# Patient Record
Sex: FEMALE | Race: WHITE | NOT HISPANIC OR LATINO | URBAN - METROPOLITAN AREA
[De-identification: names, ages, dates, MRNs, and addresses within clinical notes are randomized per-mention and may not be internally consistent; named-entity substitution may affect disease eponyms.]

---

## 2023-03-30 LAB
NATRIURETIC PEPTIDE B (PG/ML) IN SER/PLAS: 15 PG/ML (ref 0–99)
TROPONIN I, HIGH SENSITIVITY: 4 NG/L (ref 0–34)

## 2023-03-31 ENCOUNTER — OFFICE (OUTPATIENT)
Dept: URBAN - METROPOLITAN AREA CLINIC 27 | Facility: CLINIC | Age: 78
End: 2023-03-31

## 2023-03-31 VITALS
DIASTOLIC BLOOD PRESSURE: 94 MMHG | SYSTOLIC BLOOD PRESSURE: 142 MMHG | HEIGHT: 63 IN | WEIGHT: 123 LBS | HEART RATE: 113 BPM | TEMPERATURE: 97 F

## 2023-03-31 DIAGNOSIS — Z86.010 PERSONAL HISTORY OF COLONIC POLYPS: ICD-10-CM

## 2023-03-31 DIAGNOSIS — K59.09 OTHER CONSTIPATION: ICD-10-CM

## 2023-03-31 DIAGNOSIS — K21.9 GASTRO-ESOPHAGEAL REFLUX DISEASE WITHOUT ESOPHAGITIS: ICD-10-CM

## 2023-03-31 DIAGNOSIS — K22.70 BARRETT'S ESOPHAGUS WITHOUT DYSPLASIA: ICD-10-CM

## 2023-03-31 DIAGNOSIS — Z80.0 FAMILY HISTORY OF MALIGNANT NEOPLASM OF DIGESTIVE ORGANS: ICD-10-CM

## 2023-03-31 DIAGNOSIS — K22.2 ESOPHAGEAL OBSTRUCTION: ICD-10-CM

## 2023-03-31 PROCEDURE — 99214 OFFICE O/P EST MOD 30 MIN: CPT | Performed by: INTERNAL MEDICINE

## 2023-04-28 ENCOUNTER — AMBULATORY SURGICAL CENTER (OUTPATIENT)
Dept: URBAN - METROPOLITAN AREA SURGERY 12 | Facility: SURGERY | Age: 78
End: 2023-04-28

## 2023-04-28 ENCOUNTER — OFFICE (OUTPATIENT)
Dept: URBAN - METROPOLITAN AREA PATHOLOGY 2 | Facility: PATHOLOGY | Age: 78
End: 2023-04-28

## 2023-04-28 VITALS
SYSTOLIC BLOOD PRESSURE: 147 MMHG | DIASTOLIC BLOOD PRESSURE: 67 MMHG | HEART RATE: 75 BPM | SYSTOLIC BLOOD PRESSURE: 106 MMHG | DIASTOLIC BLOOD PRESSURE: 93 MMHG | SYSTOLIC BLOOD PRESSURE: 148 MMHG | DIASTOLIC BLOOD PRESSURE: 68 MMHG | RESPIRATION RATE: 12 BRPM | DIASTOLIC BLOOD PRESSURE: 93 MMHG | OXYGEN SATURATION: 96 % | DIASTOLIC BLOOD PRESSURE: 67 MMHG | SYSTOLIC BLOOD PRESSURE: 116 MMHG | SYSTOLIC BLOOD PRESSURE: 137 MMHG | HEART RATE: 75 BPM | HEART RATE: 85 BPM | SYSTOLIC BLOOD PRESSURE: 148 MMHG | OXYGEN SATURATION: 99 % | RESPIRATION RATE: 25 BRPM | HEART RATE: 98 BPM | OXYGEN SATURATION: 99 % | HEART RATE: 86 BPM | OXYGEN SATURATION: 96 % | HEART RATE: 79 BPM | OXYGEN SATURATION: 98 % | SYSTOLIC BLOOD PRESSURE: 104 MMHG | HEART RATE: 79 BPM | DIASTOLIC BLOOD PRESSURE: 60 MMHG | HEART RATE: 75 BPM | TEMPERATURE: 98.2 F | HEART RATE: 77 BPM | SYSTOLIC BLOOD PRESSURE: 129 MMHG | RESPIRATION RATE: 14 BRPM | TEMPERATURE: 98.2 F | HEART RATE: 79 BPM | SYSTOLIC BLOOD PRESSURE: 106 MMHG | RESPIRATION RATE: 19 BRPM | RESPIRATION RATE: 19 BRPM | DIASTOLIC BLOOD PRESSURE: 55 MMHG | DIASTOLIC BLOOD PRESSURE: 93 MMHG | WEIGHT: 118 LBS | DIASTOLIC BLOOD PRESSURE: 60 MMHG | OXYGEN SATURATION: 95 % | SYSTOLIC BLOOD PRESSURE: 145 MMHG | HEIGHT: 63 IN | HEIGHT: 63 IN | SYSTOLIC BLOOD PRESSURE: 152 MMHG | DIASTOLIC BLOOD PRESSURE: 94 MMHG | DIASTOLIC BLOOD PRESSURE: 60 MMHG | HEART RATE: 93 BPM | OXYGEN SATURATION: 97 % | SYSTOLIC BLOOD PRESSURE: 104 MMHG | RESPIRATION RATE: 14 BRPM | SYSTOLIC BLOOD PRESSURE: 145 MMHG | DIASTOLIC BLOOD PRESSURE: 55 MMHG | SYSTOLIC BLOOD PRESSURE: 147 MMHG | RESPIRATION RATE: 25 BRPM | SYSTOLIC BLOOD PRESSURE: 145 MMHG | OXYGEN SATURATION: 98 % | HEART RATE: 93 BPM | SYSTOLIC BLOOD PRESSURE: 137 MMHG | RESPIRATION RATE: 20 BRPM | HEART RATE: 86 BPM | RESPIRATION RATE: 12 BRPM | HEART RATE: 86 BPM | DIASTOLIC BLOOD PRESSURE: 68 MMHG | SYSTOLIC BLOOD PRESSURE: 106 MMHG | SYSTOLIC BLOOD PRESSURE: 147 MMHG | RESPIRATION RATE: 25 BRPM | DIASTOLIC BLOOD PRESSURE: 61 MMHG | SYSTOLIC BLOOD PRESSURE: 104 MMHG | RESPIRATION RATE: 20 BRPM | OXYGEN SATURATION: 95 % | DIASTOLIC BLOOD PRESSURE: 94 MMHG | SYSTOLIC BLOOD PRESSURE: 152 MMHG | RESPIRATION RATE: 13 BRPM | DIASTOLIC BLOOD PRESSURE: 94 MMHG | SYSTOLIC BLOOD PRESSURE: 137 MMHG | OXYGEN SATURATION: 97 % | DIASTOLIC BLOOD PRESSURE: 67 MMHG | DIASTOLIC BLOOD PRESSURE: 61 MMHG | OXYGEN SATURATION: 95 % | HEART RATE: 98 BPM | RESPIRATION RATE: 13 BRPM | HEART RATE: 98 BPM | HEART RATE: 93 BPM | TEMPERATURE: 98.2 F | OXYGEN SATURATION: 97 % | HEART RATE: 85 BPM | RESPIRATION RATE: 19 BRPM | HEART RATE: 91 BPM | DIASTOLIC BLOOD PRESSURE: 61 MMHG | OXYGEN SATURATION: 98 % | SYSTOLIC BLOOD PRESSURE: 129 MMHG | DIASTOLIC BLOOD PRESSURE: 55 MMHG | WEIGHT: 118 LBS | RESPIRATION RATE: 12 BRPM | HEIGHT: 63 IN | HEART RATE: 77 BPM | RESPIRATION RATE: 13 BRPM | HEART RATE: 85 BPM | SYSTOLIC BLOOD PRESSURE: 116 MMHG | OXYGEN SATURATION: 96 % | SYSTOLIC BLOOD PRESSURE: 116 MMHG | SYSTOLIC BLOOD PRESSURE: 129 MMHG | HEART RATE: 77 BPM | HEART RATE: 91 BPM | DIASTOLIC BLOOD PRESSURE: 68 MMHG | RESPIRATION RATE: 14 BRPM | RESPIRATION RATE: 20 BRPM | SYSTOLIC BLOOD PRESSURE: 148 MMHG | SYSTOLIC BLOOD PRESSURE: 152 MMHG | HEART RATE: 91 BPM | OXYGEN SATURATION: 99 % | WEIGHT: 118 LBS

## 2023-04-28 DIAGNOSIS — K29.60 OTHER GASTRITIS WITHOUT BLEEDING: ICD-10-CM

## 2023-04-28 DIAGNOSIS — K21.00 GASTRO-ESOPHAGEAL REFLUX DISEASE WITH ESOPHAGITIS, WITHOUT B: ICD-10-CM

## 2023-04-28 DIAGNOSIS — K44.9 DIAPHRAGMATIC HERNIA WITHOUT OBSTRUCTION OR GANGRENE: ICD-10-CM

## 2023-04-28 DIAGNOSIS — R13.12 DYSPHAGIA, OROPHARYNGEAL PHASE: ICD-10-CM

## 2023-04-28 DIAGNOSIS — K21.9 GASTRO-ESOPHAGEAL REFLUX DISEASE WITHOUT ESOPHAGITIS: ICD-10-CM

## 2023-04-28 DIAGNOSIS — K22.2 ESOPHAGEAL OBSTRUCTION: ICD-10-CM

## 2023-04-28 PROBLEM — R19.4 CHANGE IN BOWEL HABIT: Status: ACTIVE | Noted: 2023-04-28

## 2023-04-28 PROBLEM — K31.89 OTHER DISEASES OF STOMACH AND DUODENUM: Status: ACTIVE | Noted: 2023-04-28

## 2023-04-28 PROCEDURE — 43450 DILATE ESOPHAGUS 1/MULT PASS: CPT | Performed by: INTERNAL MEDICINE

## 2023-04-28 PROCEDURE — 43239 EGD BIOPSY SINGLE/MULTIPLE: CPT | Performed by: INTERNAL MEDICINE

## 2023-04-28 PROCEDURE — 88305 TISSUE EXAM BY PATHOLOGIST: CPT | Mod: TC | Performed by: PATHOLOGY

## 2023-04-28 PROCEDURE — 88342 IMHCHEM/IMCYTCHM 1ST ANTB: CPT | Performed by: PATHOLOGY

## 2023-04-28 PROCEDURE — 88313 SPECIAL STAINS GROUP 2: CPT | Mod: TC | Performed by: PATHOLOGY

## 2023-05-24 VITALS
RESPIRATION RATE: 13 BRPM | SYSTOLIC BLOOD PRESSURE: 160 MMHG | RESPIRATION RATE: 15 BRPM | DIASTOLIC BLOOD PRESSURE: 80 MMHG | RESPIRATION RATE: 16 BRPM | OXYGEN SATURATION: 99 % | DIASTOLIC BLOOD PRESSURE: 61 MMHG | DIASTOLIC BLOOD PRESSURE: 47 MMHG | OXYGEN SATURATION: 98 % | RESPIRATION RATE: 13 BRPM | HEART RATE: 82 BPM | DIASTOLIC BLOOD PRESSURE: 61 MMHG | DIASTOLIC BLOOD PRESSURE: 57 MMHG | DIASTOLIC BLOOD PRESSURE: 49 MMHG | SYSTOLIC BLOOD PRESSURE: 160 MMHG | OXYGEN SATURATION: 97 % | RESPIRATION RATE: 22 BRPM | RESPIRATION RATE: 17 BRPM | SYSTOLIC BLOOD PRESSURE: 96 MMHG | SYSTOLIC BLOOD PRESSURE: 93 MMHG | HEART RATE: 85 BPM | OXYGEN SATURATION: 93 % | DIASTOLIC BLOOD PRESSURE: 66 MMHG | HEART RATE: 80 BPM | RESPIRATION RATE: 22 BRPM | HEART RATE: 96 BPM | SYSTOLIC BLOOD PRESSURE: 131 MMHG | DIASTOLIC BLOOD PRESSURE: 77 MMHG | HEART RATE: 87 BPM | HEART RATE: 86 BPM | SYSTOLIC BLOOD PRESSURE: 122 MMHG | RESPIRATION RATE: 15 BRPM | DIASTOLIC BLOOD PRESSURE: 47 MMHG | OXYGEN SATURATION: 98 % | DIASTOLIC BLOOD PRESSURE: 90 MMHG | SYSTOLIC BLOOD PRESSURE: 107 MMHG | RESPIRATION RATE: 16 BRPM | OXYGEN SATURATION: 93 % | SYSTOLIC BLOOD PRESSURE: 122 MMHG | HEART RATE: 81 BPM | DIASTOLIC BLOOD PRESSURE: 66 MMHG | HEART RATE: 85 BPM | HEART RATE: 89 BPM | HEART RATE: 83 BPM | HEART RATE: 82 BPM | RESPIRATION RATE: 15 BRPM | DIASTOLIC BLOOD PRESSURE: 80 MMHG | DIASTOLIC BLOOD PRESSURE: 47 MMHG | HEART RATE: 86 BPM | HEART RATE: 79 BPM | RESPIRATION RATE: 14 BRPM | DIASTOLIC BLOOD PRESSURE: 49 MMHG | HEIGHT: 63 IN | SYSTOLIC BLOOD PRESSURE: 105 MMHG | HEART RATE: 87 BPM | TEMPERATURE: 98.4 F | DIASTOLIC BLOOD PRESSURE: 90 MMHG | HEIGHT: 63 IN | SYSTOLIC BLOOD PRESSURE: 107 MMHG | HEART RATE: 87 BPM | RESPIRATION RATE: 19 BRPM | SYSTOLIC BLOOD PRESSURE: 131 MMHG | OXYGEN SATURATION: 98 % | HEART RATE: 96 BPM | HEART RATE: 83 BPM | DIASTOLIC BLOOD PRESSURE: 66 MMHG | HEART RATE: 79 BPM | OXYGEN SATURATION: 99 % | OXYGEN SATURATION: 97 % | HEIGHT: 63 IN | RESPIRATION RATE: 14 BRPM | SYSTOLIC BLOOD PRESSURE: 107 MMHG | WEIGHT: 119 LBS | RESPIRATION RATE: 22 BRPM | HEART RATE: 77 BPM | HEART RATE: 89 BPM | OXYGEN SATURATION: 100 % | SYSTOLIC BLOOD PRESSURE: 91 MMHG | SYSTOLIC BLOOD PRESSURE: 91 MMHG | SYSTOLIC BLOOD PRESSURE: 160 MMHG | SYSTOLIC BLOOD PRESSURE: 122 MMHG | HEART RATE: 86 BPM | HEART RATE: 79 BPM | OXYGEN SATURATION: 100 % | SYSTOLIC BLOOD PRESSURE: 105 MMHG | SYSTOLIC BLOOD PRESSURE: 82 MMHG | RESPIRATION RATE: 12 BRPM | RESPIRATION RATE: 14 BRPM | RESPIRATION RATE: 19 BRPM | RESPIRATION RATE: 16 BRPM | HEART RATE: 85 BPM | DIASTOLIC BLOOD PRESSURE: 61 MMHG | WEIGHT: 119 LBS | DIASTOLIC BLOOD PRESSURE: 59 MMHG | DIASTOLIC BLOOD PRESSURE: 49 MMHG | DIASTOLIC BLOOD PRESSURE: 80 MMHG | SYSTOLIC BLOOD PRESSURE: 115 MMHG | RESPIRATION RATE: 13 BRPM | OXYGEN SATURATION: 97 % | HEART RATE: 80 BPM | OXYGEN SATURATION: 99 % | HEART RATE: 81 BPM | RESPIRATION RATE: 12 BRPM | HEART RATE: 82 BPM | SYSTOLIC BLOOD PRESSURE: 115 MMHG | RESPIRATION RATE: 12 BRPM | WEIGHT: 119 LBS | SYSTOLIC BLOOD PRESSURE: 93 MMHG | OXYGEN SATURATION: 93 % | SYSTOLIC BLOOD PRESSURE: 82 MMHG | HEART RATE: 96 BPM | OXYGEN SATURATION: 100 % | DIASTOLIC BLOOD PRESSURE: 77 MMHG | SYSTOLIC BLOOD PRESSURE: 93 MMHG | SYSTOLIC BLOOD PRESSURE: 105 MMHG | SYSTOLIC BLOOD PRESSURE: 131 MMHG | HEART RATE: 80 BPM | DIASTOLIC BLOOD PRESSURE: 90 MMHG | HEART RATE: 77 BPM | SYSTOLIC BLOOD PRESSURE: 158 MMHG | HEART RATE: 77 BPM | DIASTOLIC BLOOD PRESSURE: 57 MMHG | RESPIRATION RATE: 17 BRPM | DIASTOLIC BLOOD PRESSURE: 59 MMHG | SYSTOLIC BLOOD PRESSURE: 158 MMHG | DIASTOLIC BLOOD PRESSURE: 59 MMHG | SYSTOLIC BLOOD PRESSURE: 91 MMHG | DIASTOLIC BLOOD PRESSURE: 77 MMHG | SYSTOLIC BLOOD PRESSURE: 96 MMHG | TEMPERATURE: 98.4 F | SYSTOLIC BLOOD PRESSURE: 82 MMHG | RESPIRATION RATE: 19 BRPM | SYSTOLIC BLOOD PRESSURE: 96 MMHG | HEART RATE: 89 BPM | RESPIRATION RATE: 17 BRPM | SYSTOLIC BLOOD PRESSURE: 115 MMHG | HEART RATE: 81 BPM | SYSTOLIC BLOOD PRESSURE: 158 MMHG | TEMPERATURE: 98.4 F | HEART RATE: 83 BPM | DIASTOLIC BLOOD PRESSURE: 57 MMHG

## 2023-05-26 ENCOUNTER — AMBULATORY SURGICAL CENTER (OUTPATIENT)
Dept: URBAN - METROPOLITAN AREA SURGERY 12 | Facility: SURGERY | Age: 78
End: 2023-05-26

## 2023-05-26 ENCOUNTER — OFFICE (OUTPATIENT)
Dept: URBAN - METROPOLITAN AREA PATHOLOGY 2 | Facility: PATHOLOGY | Age: 78
End: 2023-05-26

## 2023-05-26 DIAGNOSIS — Z86.010 PERSONAL HISTORY OF COLONIC POLYPS: ICD-10-CM

## 2023-05-26 DIAGNOSIS — K64.8 OTHER HEMORRHOIDS: ICD-10-CM

## 2023-05-26 DIAGNOSIS — D12.3 BENIGN NEOPLASM OF TRANSVERSE COLON: ICD-10-CM

## 2023-05-26 DIAGNOSIS — Z80.0 FAMILY HISTORY OF MALIGNANT NEOPLASM OF DIGESTIVE ORGANS: ICD-10-CM

## 2023-05-26 PROCEDURE — 45380 COLONOSCOPY AND BIOPSY: CPT | Performed by: INTERNAL MEDICINE

## 2023-05-26 PROCEDURE — 88305 TISSUE EXAM BY PATHOLOGIST: CPT | Mod: TC | Performed by: PATHOLOGY

## 2023-08-22 PROBLEM — N39.0 RECURRENT UTI: Status: ACTIVE | Noted: 2023-08-22

## 2023-08-22 PROBLEM — G62.9 NEUROPATHY: Status: ACTIVE | Noted: 2023-08-22

## 2023-08-22 PROBLEM — I10 HYPERTENSION: Status: ACTIVE | Noted: 2023-08-22

## 2023-08-22 PROBLEM — I51.89 DIASTOLIC DYSFUNCTION: Status: ACTIVE | Noted: 2023-08-22

## 2023-08-22 PROBLEM — R06.02 SHORTNESS OF BREATH ON EXERTION: Status: ACTIVE | Noted: 2023-08-22

## 2023-08-22 PROBLEM — C50.919 BREAST CANCER, FEMALE (MULTI): Status: ACTIVE | Noted: 2023-08-22

## 2023-08-22 PROBLEM — E78.5 HYPERLIPIDEMIA: Status: ACTIVE | Noted: 2023-08-22

## 2023-08-22 PROBLEM — E87.8 ELECTROLYTE IMBALANCE: Status: ACTIVE | Noted: 2023-08-22

## 2023-08-22 PROBLEM — I71.9 AORTIC ANEURYSM (CMS-HCC): Status: ACTIVE | Noted: 2023-08-22

## 2023-08-22 PROBLEM — R07.89 ATYPICAL CHEST PAIN: Status: ACTIVE | Noted: 2023-08-22

## 2023-08-22 PROBLEM — I77.819 DILATION OF AORTA (CMS-HCC): Status: ACTIVE | Noted: 2023-08-22

## 2023-08-22 PROBLEM — C77.0: Status: ACTIVE | Noted: 2023-08-22

## 2023-08-22 RX ORDER — NAPROXEN SODIUM 220 MG/1
1 TABLET, FILM COATED ORAL DAILY
COMMUNITY
Start: 2016-05-13

## 2023-08-22 RX ORDER — IBUPROFEN 100 MG/5ML
1000 SUSPENSION, ORAL (FINAL DOSE FORM) ORAL
COMMUNITY

## 2023-08-22 RX ORDER — FAMOTIDINE 40 MG/1
40 TABLET, FILM COATED ORAL DAILY
COMMUNITY
Start: 2019-11-12

## 2023-08-22 RX ORDER — ONDANSETRON 4 MG/1
4 TABLET, FILM COATED ORAL EVERY 8 HOURS PRN
COMMUNITY
Start: 2021-09-10

## 2023-08-22 RX ORDER — CETIRIZINE HYDROCHLORIDE 10 MG/1
10 TABLET ORAL DAILY
COMMUNITY
End: 2024-02-07 | Stop reason: ALTCHOICE

## 2023-08-22 RX ORDER — AMITRIPTYLINE HYDROCHLORIDE 25 MG/1
25 TABLET, FILM COATED ORAL NIGHTLY
COMMUNITY
Start: 2022-06-02 | End: 2024-02-07 | Stop reason: ALTCHOICE

## 2023-08-22 RX ORDER — DIPHENHYDRAMINE HCL 25 MG
25 CAPSULE ORAL NIGHTLY
COMMUNITY
Start: 2020-06-15

## 2023-08-22 RX ORDER — SULFAMETHOXAZOLE AND TRIMETHOPRIM 400; 80 MG/1; MG/1
0.5 TABLET ORAL DAILY
COMMUNITY

## 2023-08-22 RX ORDER — ATORVASTATIN CALCIUM 40 MG/1
40 TABLET, FILM COATED ORAL NIGHTLY
COMMUNITY
Start: 2016-05-13 | End: 2023-11-28 | Stop reason: SDUPTHER

## 2023-08-22 RX ORDER — FLUTICASONE PROPIONATE 50 MCG
2 SPRAY, SUSPENSION (ML) NASAL DAILY PRN
COMMUNITY
Start: 2014-02-27 | End: 2024-02-07 | Stop reason: ALTCHOICE

## 2023-08-22 RX ORDER — ONDANSETRON HYDROCHLORIDE 8 MG/1
8 TABLET, FILM COATED ORAL EVERY 6 HOURS
COMMUNITY
Start: 2014-12-03 | End: 2024-02-07 | Stop reason: ALTCHOICE

## 2023-08-22 RX ORDER — RAMIPRIL 5 MG/1
2.5 CAPSULE ORAL DAILY
COMMUNITY
Start: 2016-11-15 | End: 2023-11-28 | Stop reason: WASHOUT

## 2023-09-12 VITALS — HEIGHT: 63 IN | WEIGHT: 119.27 LBS | BODY MASS INDEX: 21.13 KG/M2

## 2023-09-12 DIAGNOSIS — Z17.1 MALIGNANT NEOPLASM OF LEFT BREAST IN FEMALE, ESTROGEN RECEPTOR NEGATIVE, UNSPECIFIED SITE OF BREAST (MULTI): Primary | ICD-10-CM

## 2023-09-12 DIAGNOSIS — C50.912 MALIGNANT NEOPLASM OF LEFT BREAST IN FEMALE, ESTROGEN RECEPTOR NEGATIVE, UNSPECIFIED SITE OF BREAST (MULTI): Primary | ICD-10-CM

## 2023-09-12 RX ORDER — HEPARIN 100 UNIT/ML
500 SYRINGE INTRAVENOUS AS NEEDED
OUTPATIENT
Start: 2023-09-30

## 2023-09-12 RX ORDER — HEPARIN SODIUM,PORCINE/PF 10 UNIT/ML
50 SYRINGE (ML) INTRAVENOUS AS NEEDED
OUTPATIENT
Start: 2023-09-30

## 2023-09-13 RX ORDER — PROCHLORPERAZINE MALEATE 10 MG
10 TABLET ORAL EVERY 6 HOURS PRN
Status: CANCELLED | OUTPATIENT
Start: 2023-10-04

## 2023-09-13 RX ORDER — DIPHENHYDRAMINE HYDROCHLORIDE 50 MG/ML
50 INJECTION INTRAMUSCULAR; INTRAVENOUS AS NEEDED
Status: CANCELLED | OUTPATIENT
Start: 2023-10-04

## 2023-09-13 RX ORDER — FAMOTIDINE 10 MG/ML
20 INJECTION INTRAVENOUS ONCE AS NEEDED
Status: CANCELLED | OUTPATIENT
Start: 2023-10-04

## 2023-09-13 RX ORDER — EPINEPHRINE 0.3 MG/.3ML
0.3 INJECTION SUBCUTANEOUS EVERY 5 MIN PRN
Status: CANCELLED | OUTPATIENT
Start: 2023-10-04

## 2023-09-13 RX ORDER — ALBUTEROL SULFATE 0.83 MG/ML
3 SOLUTION RESPIRATORY (INHALATION) AS NEEDED
Status: CANCELLED | OUTPATIENT
Start: 2023-10-04

## 2023-09-13 RX ORDER — PROCHLORPERAZINE EDISYLATE 5 MG/ML
10 INJECTION INTRAMUSCULAR; INTRAVENOUS EVERY 6 HOURS PRN
Status: CANCELLED | OUTPATIENT
Start: 2023-10-04

## 2023-09-25 LAB
BASOPHILS (10*3/UL) IN BLOOD BY AUTOMATED COUNT: 0.07 X10E9/L (ref 0–0.1)
BASOPHILS/100 LEUKOCYTES IN BLOOD BY AUTOMATED COUNT: 1.2 % (ref 0–2)
EOSINOPHILS (10*3/UL) IN BLOOD BY AUTOMATED COUNT: 0.12 X10E9/L (ref 0–0.4)
EOSINOPHILS/100 LEUKOCYTES IN BLOOD BY AUTOMATED COUNT: 2 % (ref 0–6)
ERYTHROCYTE DISTRIBUTION WIDTH (RATIO) BY AUTOMATED COUNT: 13.2 % (ref 11.5–14.5)
ERYTHROCYTE MEAN CORPUSCULAR HEMOGLOBIN CONCENTRATION (G/DL) BY AUTOMATED: 32.7 G/DL (ref 32–36)
ERYTHROCYTE MEAN CORPUSCULAR VOLUME (FL) BY AUTOMATED COUNT: 99 FL (ref 80–100)
ERYTHROCYTES (10*6/UL) IN BLOOD BY AUTOMATED COUNT: 3.8 X10E12/L (ref 4–5.2)
HEMATOCRIT (%) IN BLOOD BY AUTOMATED COUNT: 37.6 % (ref 36–46)
HEMOGLOBIN (G/DL) IN BLOOD: 12.3 G/DL (ref 12–16)
IMMATURE GRANULOCYTES/100 LEUKOCYTES IN BLOOD BY AUTOMATED COUNT: 0.3 % (ref 0–0.9)
LEUKOCYTES (10*3/UL) IN BLOOD BY AUTOMATED COUNT: 6 X10E9/L (ref 4.4–11.3)
LYMPHOCYTES (10*3/UL) IN BLOOD BY AUTOMATED COUNT: 1.67 X10E9/L (ref 0.8–3)
LYMPHOCYTES/100 LEUKOCYTES IN BLOOD BY AUTOMATED COUNT: 27.6 % (ref 13–44)
MONOCYTES (10*3/UL) IN BLOOD BY AUTOMATED COUNT: 0.46 X10E9/L (ref 0.05–0.8)
MONOCYTES/100 LEUKOCYTES IN BLOOD BY AUTOMATED COUNT: 7.6 % (ref 2–10)
NEUTROPHILS (10*3/UL) IN BLOOD BY AUTOMATED COUNT: 3.7 X10E9/L (ref 1.6–5.5)
NEUTROPHILS/100 LEUKOCYTES IN BLOOD BY AUTOMATED COUNT: 61.3 % (ref 40–80)
PLATELETS (10*3/UL) IN BLOOD AUTOMATED COUNT: 264 X10E9/L (ref 150–450)

## 2023-09-26 LAB
ALANINE AMINOTRANSFERASE (SGPT) (U/L) IN SER/PLAS: 17 U/L (ref 7–45)
ALBUMIN (G/DL) IN SER/PLAS: 4.3 G/DL (ref 3.4–5)
ALKALINE PHOSPHATASE (U/L) IN SER/PLAS: 56 U/L (ref 33–136)
ANION GAP IN SER/PLAS: 11 MMOL/L (ref 10–20)
ASPARTATE AMINOTRANSFERASE (SGOT) (U/L) IN SER/PLAS: 23 U/L (ref 9–39)
BILIRUBIN TOTAL (MG/DL) IN SER/PLAS: 0.4 MG/DL (ref 0–1.2)
CALCIUM (MG/DL) IN SER/PLAS: 9.4 MG/DL (ref 8.6–10.6)
CARBON DIOXIDE, TOTAL (MMOL/L) IN SER/PLAS: 30 MMOL/L (ref 21–32)
CHLORIDE (MMOL/L) IN SER/PLAS: 102 MMOL/L (ref 98–107)
CREATININE (MG/DL) IN SER/PLAS: 1.18 MG/DL (ref 0.5–1.05)
GFR FEMALE: 48 ML/MIN/1.73M2
GLUCOSE (MG/DL) IN SER/PLAS: 77 MG/DL (ref 74–99)
POTASSIUM (MMOL/L) IN SER/PLAS: 5.3 MMOL/L (ref 3.5–5.3)
PROTEIN TOTAL: 6.6 G/DL (ref 6.4–8.2)
SODIUM (MMOL/L) IN SER/PLAS: 138 MMOL/L (ref 136–145)
UREA NITROGEN (MG/DL) IN SER/PLAS: 19 MG/DL (ref 6–23)

## 2023-10-02 ENCOUNTER — OFFICE VISIT (OUTPATIENT)
Dept: HEMATOLOGY/ONCOLOGY | Facility: CLINIC | Age: 78
End: 2023-10-02
Payer: MEDICARE

## 2023-10-02 ENCOUNTER — INFUSION (OUTPATIENT)
Dept: HEMATOLOGY/ONCOLOGY | Facility: CLINIC | Age: 78
End: 2023-10-02
Payer: MEDICARE

## 2023-10-02 VITALS
WEIGHT: 120.15 LBS | SYSTOLIC BLOOD PRESSURE: 133 MMHG | OXYGEN SATURATION: 97 % | HEART RATE: 86 BPM | TEMPERATURE: 97.9 F | HEIGHT: 61 IN | BODY MASS INDEX: 22.68 KG/M2 | RESPIRATION RATE: 18 BRPM | DIASTOLIC BLOOD PRESSURE: 87 MMHG

## 2023-10-02 DIAGNOSIS — Z17.1 MALIGNANT NEOPLASM OF LEFT BREAST IN FEMALE, ESTROGEN RECEPTOR NEGATIVE, UNSPECIFIED SITE OF BREAST (MULTI): ICD-10-CM

## 2023-10-02 DIAGNOSIS — C50.912 MALIGNANT NEOPLASM OF LEFT BREAST IN FEMALE, ESTROGEN RECEPTOR NEGATIVE, UNSPECIFIED SITE OF BREAST (MULTI): ICD-10-CM

## 2023-10-02 DIAGNOSIS — C50.919 BREAST CANCER METASTASIZED TO MULTIPLE SITES, UNSPECIFIED LATERALITY (MULTI): Primary | ICD-10-CM

## 2023-10-02 PROCEDURE — 96401 CHEMO ANTI-NEOPL SQ/IM: CPT

## 2023-10-02 PROCEDURE — 3079F DIAST BP 80-89 MM HG: CPT | Performed by: STUDENT IN AN ORGANIZED HEALTH CARE EDUCATION/TRAINING PROGRAM

## 2023-10-02 PROCEDURE — 3075F SYST BP GE 130 - 139MM HG: CPT | Performed by: STUDENT IN AN ORGANIZED HEALTH CARE EDUCATION/TRAINING PROGRAM

## 2023-10-02 PROCEDURE — 2500000004 HC RX 250 GENERAL PHARMACY W/ HCPCS (ALT 636 FOR OP/ED): Performed by: INTERNAL MEDICINE

## 2023-10-02 PROCEDURE — 99214 OFFICE O/P EST MOD 30 MIN: CPT | Performed by: STUDENT IN AN ORGANIZED HEALTH CARE EDUCATION/TRAINING PROGRAM

## 2023-10-02 PROCEDURE — 1126F AMNT PAIN NOTED NONE PRSNT: CPT | Performed by: STUDENT IN AN ORGANIZED HEALTH CARE EDUCATION/TRAINING PROGRAM

## 2023-10-02 PROCEDURE — 1159F MED LIST DOCD IN RCRD: CPT | Performed by: STUDENT IN AN ORGANIZED HEALTH CARE EDUCATION/TRAINING PROGRAM

## 2023-10-02 PROCEDURE — 1036F TOBACCO NON-USER: CPT | Performed by: STUDENT IN AN ORGANIZED HEALTH CARE EDUCATION/TRAINING PROGRAM

## 2023-10-02 RX ORDER — FAMOTIDINE 10 MG/ML
20 INJECTION INTRAVENOUS ONCE AS NEEDED
Status: DISCONTINUED | OUTPATIENT
Start: 2023-10-02 | End: 2023-10-02 | Stop reason: HOSPADM

## 2023-10-02 RX ORDER — EPINEPHRINE 0.3 MG/.3ML
0.3 INJECTION SUBCUTANEOUS EVERY 5 MIN PRN
Status: CANCELLED | OUTPATIENT
Start: 2023-12-06

## 2023-10-02 RX ORDER — FAMOTIDINE 10 MG/ML
20 INJECTION INTRAVENOUS ONCE AS NEEDED
Status: CANCELLED | OUTPATIENT
Start: 2023-10-25

## 2023-10-02 RX ORDER — PROCHLORPERAZINE EDISYLATE 5 MG/ML
10 INJECTION INTRAMUSCULAR; INTRAVENOUS EVERY 6 HOURS PRN
Status: CANCELLED | OUTPATIENT
Start: 2023-12-27

## 2023-10-02 RX ORDER — ALBUTEROL SULFATE 0.83 MG/ML
3 SOLUTION RESPIRATORY (INHALATION) AS NEEDED
Status: CANCELLED | OUTPATIENT
Start: 2023-12-27

## 2023-10-02 RX ORDER — FAMOTIDINE 10 MG/ML
20 INJECTION INTRAVENOUS ONCE AS NEEDED
Status: CANCELLED | OUTPATIENT
Start: 2023-11-15

## 2023-10-02 RX ORDER — IBUPROFEN 200 MG
1 CAPSULE ORAL DAILY
COMMUNITY

## 2023-10-02 RX ORDER — EPINEPHRINE 0.3 MG/.3ML
0.3 INJECTION SUBCUTANEOUS EVERY 5 MIN PRN
Status: DISCONTINUED | OUTPATIENT
Start: 2023-10-02 | End: 2023-10-02 | Stop reason: HOSPADM

## 2023-10-02 RX ORDER — EPINEPHRINE 0.3 MG/.3ML
0.3 INJECTION SUBCUTANEOUS EVERY 5 MIN PRN
Status: CANCELLED | OUTPATIENT
Start: 2023-12-27

## 2023-10-02 RX ORDER — EPINEPHRINE 0.3 MG/.3ML
0.3 INJECTION SUBCUTANEOUS EVERY 5 MIN PRN
Status: CANCELLED | OUTPATIENT
Start: 2023-10-25

## 2023-10-02 RX ORDER — ALBUTEROL SULFATE 0.83 MG/ML
3 SOLUTION RESPIRATORY (INHALATION) AS NEEDED
Status: CANCELLED | OUTPATIENT
Start: 2023-10-25

## 2023-10-02 RX ORDER — PROCHLORPERAZINE MALEATE 10 MG
10 TABLET ORAL EVERY 6 HOURS PRN
Status: CANCELLED | OUTPATIENT
Start: 2023-10-25

## 2023-10-02 RX ORDER — DIPHENHYDRAMINE HYDROCHLORIDE 50 MG/ML
50 INJECTION INTRAMUSCULAR; INTRAVENOUS AS NEEDED
Status: CANCELLED | OUTPATIENT
Start: 2023-10-25

## 2023-10-02 RX ORDER — PROCHLORPERAZINE EDISYLATE 5 MG/ML
10 INJECTION INTRAMUSCULAR; INTRAVENOUS EVERY 6 HOURS PRN
Status: CANCELLED | OUTPATIENT
Start: 2023-10-25

## 2023-10-02 RX ORDER — DIPHENHYDRAMINE HYDROCHLORIDE 50 MG/ML
50 INJECTION INTRAMUSCULAR; INTRAVENOUS AS NEEDED
Status: CANCELLED | OUTPATIENT
Start: 2023-12-06

## 2023-10-02 RX ORDER — PROCHLORPERAZINE EDISYLATE 5 MG/ML
10 INJECTION INTRAMUSCULAR; INTRAVENOUS EVERY 6 HOURS PRN
Status: CANCELLED | OUTPATIENT
Start: 2023-12-06

## 2023-10-02 RX ORDER — PROCHLORPERAZINE MALEATE 10 MG
10 TABLET ORAL EVERY 6 HOURS PRN
Status: CANCELLED | OUTPATIENT
Start: 2023-12-06

## 2023-10-02 RX ORDER — EPINEPHRINE 0.3 MG/.3ML
0.3 INJECTION SUBCUTANEOUS EVERY 5 MIN PRN
Status: CANCELLED | OUTPATIENT
Start: 2023-11-15

## 2023-10-02 RX ORDER — DIPHENHYDRAMINE HYDROCHLORIDE 50 MG/ML
50 INJECTION INTRAMUSCULAR; INTRAVENOUS AS NEEDED
Status: CANCELLED | OUTPATIENT
Start: 2023-11-15

## 2023-10-02 RX ORDER — PROCHLORPERAZINE MALEATE 10 MG
10 TABLET ORAL EVERY 6 HOURS PRN
Status: CANCELLED | OUTPATIENT
Start: 2023-11-15

## 2023-10-02 RX ORDER — FAMOTIDINE 10 MG/ML
20 INJECTION INTRAVENOUS ONCE AS NEEDED
Status: CANCELLED | OUTPATIENT
Start: 2023-12-27

## 2023-10-02 RX ORDER — DIPHENHYDRAMINE HYDROCHLORIDE 50 MG/ML
50 INJECTION INTRAMUSCULAR; INTRAVENOUS AS NEEDED
Status: DISCONTINUED | OUTPATIENT
Start: 2023-10-02 | End: 2023-10-02 | Stop reason: HOSPADM

## 2023-10-02 RX ORDER — PROCHLORPERAZINE EDISYLATE 5 MG/ML
10 INJECTION INTRAMUSCULAR; INTRAVENOUS EVERY 6 HOURS PRN
Status: CANCELLED | OUTPATIENT
Start: 2023-11-15

## 2023-10-02 RX ORDER — PROCHLORPERAZINE MALEATE 10 MG
10 TABLET ORAL EVERY 6 HOURS PRN
Status: DISCONTINUED | OUTPATIENT
Start: 2023-10-02 | End: 2023-10-02 | Stop reason: HOSPADM

## 2023-10-02 RX ORDER — ALBUTEROL SULFATE 0.83 MG/ML
3 SOLUTION RESPIRATORY (INHALATION) AS NEEDED
Status: DISCONTINUED | OUTPATIENT
Start: 2023-10-02 | End: 2023-10-02 | Stop reason: HOSPADM

## 2023-10-02 RX ORDER — PROCHLORPERAZINE MALEATE 10 MG
10 TABLET ORAL EVERY 6 HOURS PRN
Status: CANCELLED | OUTPATIENT
Start: 2023-12-27

## 2023-10-02 RX ORDER — ALBUTEROL SULFATE 0.83 MG/ML
3 SOLUTION RESPIRATORY (INHALATION) AS NEEDED
Status: CANCELLED | OUTPATIENT
Start: 2023-12-06

## 2023-10-02 RX ORDER — FAMOTIDINE 10 MG/ML
20 INJECTION INTRAVENOUS ONCE AS NEEDED
Status: CANCELLED | OUTPATIENT
Start: 2023-12-06

## 2023-10-02 RX ORDER — ALBUTEROL SULFATE 0.83 MG/ML
3 SOLUTION RESPIRATORY (INHALATION) AS NEEDED
Status: CANCELLED | OUTPATIENT
Start: 2023-11-15

## 2023-10-02 RX ORDER — DIPHENHYDRAMINE HYDROCHLORIDE 50 MG/ML
50 INJECTION INTRAMUSCULAR; INTRAVENOUS AS NEEDED
Status: CANCELLED | OUTPATIENT
Start: 2023-12-27

## 2023-10-02 RX ORDER — PROCHLORPERAZINE EDISYLATE 5 MG/ML
10 INJECTION INTRAMUSCULAR; INTRAVENOUS EVERY 6 HOURS PRN
Status: DISCONTINUED | OUTPATIENT
Start: 2023-10-02 | End: 2023-10-02 | Stop reason: HOSPADM

## 2023-10-02 RX ADMIN — PERTUZUMAB, TRASTUZUMAB, AND HYALURONIDASE-ZZXF 10 ML: 600; 600; 2000 INJECTION, SOLUTION SUBCUTANEOUS at 11:36

## 2023-10-02 ASSESSMENT — ENCOUNTER SYMPTOMS
COUGH: 0
NAUSEA: 0
FATIGUE: 0
ADENOPATHY: 0
CONSTIPATION: 0
APPETITE CHANGE: 0
DIFFICULTY URINATING: 0
HEADACHES: 0
CHILLS: 0
FEVER: 0
SHORTNESS OF BREATH: 0
CONFUSION: 0
LEG SWELLING: 0
DIARRHEA: 0
HEMATURIA: 0
UNEXPECTED WEIGHT CHANGE: 0
VOMITING: 0
ABDOMINAL PAIN: 0
BACK PAIN: 0
HOT FLASHES: 0
DYSURIA: 0
ARTHRALGIAS: 0
DIZZINESS: 0
NUMBNESS: 0

## 2023-10-02 ASSESSMENT — PAIN SCALES - GENERAL: PAINLEVEL: 0-NO PAIN

## 2023-10-02 NOTE — PATIENT INSTRUCTIONS
If you have any questions or concerns please call [phone]    Obtain echocardiogram with primary cardiologist. We will follow up with you in 3 months with scans prior. Please schedule to have your medi-port removed.

## 2023-10-02 NOTE — SIGNIFICANT EVENT
10/02/23 1009   Prechemo Checklist   Has the patient been in the hospital, ED, or urgent care since last date of service No   Chemo/Immuno Consent Signed Yes   Protocol/Indications Verified Yes   Confirmed to previous date/time of medication Yes   Compared to previous dose Yes   All medications are dated accurately Yes   Pregnancy Test Negative Not applicable   Parameters Met Yes   BSA/Weight-Height Verified Yes   Dose Calculations Verified Yes     Ok to treat 2 days early and with 3/2023 echo per Dr. Dye communication orders.

## 2023-10-02 NOTE — PROGRESS NOTES
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 The final pathology showed scattered ducts in the superolateral segment of the breast involved by high grade ductal carcinoma in situ with multifocal areas of microinvasive carcinoma. DCIS and microinvasive tumor approaches to within 1 mm of the superior lateral margin. The DCIS and microinvasive were ER and MD negative. She also had 0 of 3 lymph nodes which were negative for malignancy. She then had a re-excision which was negative for residual DCIS or microinvasive disease. She had no adjuvant therapy.    She did well until 2014 when she self palpated L neck nodes. A CT neck with contrast was performed on 7/30/14 which showed enlarged and abnormally enhancing left posterior cervical space lymph node near the left subclavicular space. Smaller left posterior cervical space lymph nodes are also noted. A CT chest with contrast was also performed on 7/30/14 which showed new left axillary and retroclavicular adenopathy suspicious for metastatic disease, a new subtle 5 mm focus  in the T12 vertebral body suspicious for early osseous mets and a new 2 to 3 mm lung nodule in the right lower lobe. She had a PET/CT on 8/7/14 which showed intensely hypermetabolic lower cervical and thoracic adenopathy suspicious for recurrent/metastatic breast cancer. Also an intensely hypermetabolic subcarinal lymph node was seen with a max SUV of 8 suspicious for metastasis. She was seen by Dr. Fernando Paris on 8/11/14 and he performed an excisional biopsy of the left axillary lymph node as well as a left chest wall mass on 8/14.     The left axillary lymph node showed metastatic carcinoma with extracapsular extension which was ER <1%, MD 0% and HER2 3+ by IHC.    A bronchoscopy was performed on 9/2/14 which  showed malignant cells derived from adenocarcinoma, morphologically compatible with the patient's known history of breast cancer which were ER/TN negative by IHC and HER2 positive (3+ by IHC)     Patient started first line systemic chemotherapy treatment with trastuzumab, pertuzumab, and docetaxel on 2014 and completed 6 cycles with a CR by imaging.    Herceptin/Perjeta monotherapy started 2015    GYN History/Pertinent Family history::  Sister diagnosed with breast cancer at age 52 who underwent genetic testing and was negative for BRCA1/BRCA2 per patient.     Maternal aunt with colon cancer diagnosed in her 60s.     Maternal grandmother diagnosed with lymphoma and paternal grandmother diagnosed with liver cancer.    Gyn History: 1st period at age 15. 1st pregnancy at age 32. . Last menstrual period in        Subjective: Interval History  Patient presents today for follow-up visit.  She reports that she is doing well.  No interval events since she was last seen.  She has previously seen the results of her scans and has no concerns.  She is establishing with a cardiologist for AAA and has echocardiogram scheduled for .  No concerns at this time.    ROS:     Review of Systems   Constitutional:  Negative for appetite change, chills, fatigue, fever and unexpected weight change.   HENT:   Negative for mouth sores.    Respiratory:  Negative for cough and shortness of breath.    Cardiovascular:  Negative for chest pain and leg swelling.   Gastrointestinal:  Negative for abdominal pain, constipation, diarrhea, nausea and vomiting.   Endocrine: Negative for hot flashes.   Genitourinary:  Negative for difficulty urinating, dysuria and hematuria.    Musculoskeletal:  Negative for arthralgias and back pain.   Skin:  Negative for rash.   Neurological:  Negative for dizziness, headaches and numbness.   Hematological:  Negative for adenopathy.   Psychiatric/Behavioral:  Negative for confusion.      "    Physical Examination:    /87 (BP Location: Right arm, Patient Position: Sitting, BP Cuff Size: Adult)   Pulse 86   Temp 36.6 °C (97.9 °F)   Resp 18   Ht 1.548 m (5' 0.95\")   Wt 54.5 kg (120 lb 2.4 oz)   SpO2 97%   BMI 22.74 kg/m²     Physical Exam  Vitals and nursing note reviewed.   Constitutional:       General: She is not in acute distress.     Appearance: Normal appearance. She is not toxic-appearing.   HENT:      Head: Normocephalic and atraumatic.      Mouth/Throat:      Pharynx: Oropharynx is clear.   Eyes:      Extraocular Movements: Extraocular movements intact.      Conjunctiva/sclera: Conjunctivae normal.   Cardiovascular:      Rate and Rhythm: Normal rate and regular rhythm.   Pulmonary:      Effort: Pulmonary effort is normal. No respiratory distress.   Abdominal:      General: Abdomen is flat. Bowel sounds are normal.      Palpations: Abdomen is soft.   Musculoskeletal:         General: No swelling. Normal range of motion.      Cervical back: Normal range of motion.   Skin:     General: Skin is warm and dry.   Neurological:      General: No focal deficit present.      Mental Status: She is alert.   Psychiatric:         Mood and Affect: Mood normal.       Breast Examination:    Deferred    ECOG Performance Status:     [x] 0 Fully active, able to carry on all pre-disease performance without restriction  [] 1 Restricted in physically strenuous activity but ambulatory and able to carry out work of a light or sedentary nature, e.g., light house work, office work  [] 2 Ambulatory and capable of all selfcare but unable to carry out any work activities; up and about more than 50% of waking hours  [] 3 Capable of only limited selfcare; confined to bed or chair more than 50% of waking hours  [] 4 Completely disabled; cannot carry on any selfcare; totally confined to bed or chair  [] 5 Dead     Labs:  None since last visit.     Imagin23 CT Chest and NM bone scan:    No scintigraphic " evidence of osseous metastatic disease.  Stable examination. Robust vascular calcification. Stable  ascending aortic aneurysm. No features of metastatic disease. No new  or growing pulmonary nodules. Postsurgical changes seen in the left  breast      Pathology:  None since last visit.     Assessment:     Metastatic ER/NH negative, HER2 positive breast cancer diagnosed in 2014. S/p THPx6 and has been on maintenance herceptin/perjeta (phesgo) since 2015.     Tolerating phesgo; re-staging scans stable. No concerns at this time.     Plan:    Surgical Plan: S/p L mastectomy and SLNBx in 2012  Additional biopsy: Not indicated  Radiation Plan: Not indicated  Additional staging scans/DEXA/echo: Recent staging scans reviewed. She will require echocardiogram every 3 months while receiving HER2 directed therapy managed by onco-cardiology.  Additional Path info (i.e Ki67, PDL1): Remains on maintenance herceptin/perjeta since 2015, now on phesgo  Gene assays: Remains on maintenance herceptin/perjeta since 2015, now on phesgo    Systemic treatment plan: Remains on maintenance herceptin/perjeta since 2015, now on phesgo     Intent: Palliative/disease control   Clinical trial: N/A   Endocrine therapy: Not indicated   HER2 treatment: Remains on maintenance herceptin/perjeta since 2015, now on phesgo   Targeted agents: Not indicated   Chemotherapy: Received Docetaxel/Herceptin/Perjeta X6 in 2014   BMA: N/A    Access: She has a port in place, being flushed every 6 weeks; agreeable to removal. Order placed this visit.   Supportive meds: N/A  Genetic testing: Information given to patient. She has not had genetic testing, sister was negative.  Fertility preservation: Not indicated  Other active problems/orders:     Osteopenia: 5/2021 DEXA. We discussed general recommendations for bone loss prevention which include 6188-1649 mg of calcium intake per day for adults >50yrs, and no history of renal calculi; 800-1000 IU of vitamin D3 per day  for adults >50yrs, and no history of renal calculi. Weight bearing exercise. Discontinue smoking. Avoid excessive use of caffeine, soft drinks, and alcoholic beverages. In addition, balance training and fall prevention programs can help reduce the risk of fractures. She was intermittenly receiving     Enlarged AAA: She will be establishing with new cardiologist October 12th.     Surveillance plan: Patient has been alternating NM bone scan and CT CAP every 3 months. CT CAP ordered for next visit.     Follow-up: 3 months    Patient expressed understanding of the plan outlined above. She had ample time to ask questions. She understands she can contact us should she have additional questions or issues arise in the interim.

## 2023-10-03 ENCOUNTER — TELEPHONE (OUTPATIENT)
Dept: HEMATOLOGY/ONCOLOGY | Facility: CLINIC | Age: 78
End: 2023-10-03
Payer: MEDICARE

## 2023-10-03 NOTE — TELEPHONE ENCOUNTER
Patient seen by MD on 10/2, discussed mediport removal and orders enter accordingly. RN called [facility] requesting to contact patient to schedule date of removal.

## 2023-10-12 ENCOUNTER — OFFICE VISIT (OUTPATIENT)
Dept: CARDIOLOGY | Facility: CLINIC | Age: 78
End: 2023-10-12
Payer: MEDICARE

## 2023-10-12 ENCOUNTER — HOSPITAL ENCOUNTER (OUTPATIENT)
Dept: CARDIOLOGY | Facility: CLINIC | Age: 78
Discharge: HOME | End: 2023-10-12
Payer: MEDICARE

## 2023-10-12 VITALS
WEIGHT: 123 LBS | BODY MASS INDEX: 23.28 KG/M2 | OXYGEN SATURATION: 97 % | HEART RATE: 105 BPM | TEMPERATURE: 98.4 F | DIASTOLIC BLOOD PRESSURE: 89 MMHG | SYSTOLIC BLOOD PRESSURE: 125 MMHG

## 2023-10-12 DIAGNOSIS — I77.819 DILATION OF AORTA (CMS-HCC): Primary | ICD-10-CM

## 2023-10-12 DIAGNOSIS — Z51.81 ENCOUNTER FOR MONITORING CARDIOTOXIC DRUG THERAPY: ICD-10-CM

## 2023-10-12 DIAGNOSIS — Z79.899 ENCOUNTER FOR MONITORING CARDIOTOXIC DRUG THERAPY: ICD-10-CM

## 2023-10-12 LAB — EJECTION FRACTION APICAL 4 CHAMBER: 35.5

## 2023-10-12 PROCEDURE — 76376 3D RENDER W/INTRP POSTPROCES: CPT

## 2023-10-12 PROCEDURE — 93356 MYOCRD STRAIN IMG SPCKL TRCK: CPT | Performed by: INTERNAL MEDICINE

## 2023-10-12 PROCEDURE — 99213 OFFICE O/P EST LOW 20 MIN: CPT | Performed by: NURSE PRACTITIONER

## 2023-10-12 PROCEDURE — 1160F RVW MEDS BY RX/DR IN RCRD: CPT | Performed by: NURSE PRACTITIONER

## 2023-10-12 PROCEDURE — 1036F TOBACCO NON-USER: CPT | Performed by: NURSE PRACTITIONER

## 2023-10-12 PROCEDURE — 1126F AMNT PAIN NOTED NONE PRSNT: CPT | Performed by: NURSE PRACTITIONER

## 2023-10-12 PROCEDURE — 3079F DIAST BP 80-89 MM HG: CPT | Performed by: NURSE PRACTITIONER

## 2023-10-12 PROCEDURE — 93308 TTE F-UP OR LMTD: CPT | Performed by: INTERNAL MEDICINE

## 2023-10-12 PROCEDURE — 76376 3D RENDER W/INTRP POSTPROCES: CPT | Performed by: INTERNAL MEDICINE

## 2023-10-12 PROCEDURE — 3074F SYST BP LT 130 MM HG: CPT | Performed by: NURSE PRACTITIONER

## 2023-10-12 PROCEDURE — 1159F MED LIST DOCD IN RCRD: CPT | Performed by: NURSE PRACTITIONER

## 2023-10-12 ASSESSMENT — ENCOUNTER SYMPTOMS
GASTROINTESTINAL NEGATIVE: 1
HEMATURIA: 0
COUGH: 0
CONSTITUTIONAL NEGATIVE: 1
SHORTNESS OF BREATH: 1
DYSPNEA ON EXERTION: 1
PSYCHIATRIC NEGATIVE: 1

## 2023-10-12 ASSESSMENT — PAIN SCALES - GENERAL: PAINLEVEL: 0-NO PAIN

## 2023-10-12 NOTE — PROGRESS NOTES
Chief Complaint:  Follow-up long term cardiotoxic medication    HPI 76 yo CF with h/o long term cardiotoxic chemo, COPD, stage IV breast cancer on lifelong herceptin/presents for routine follow up.  She has no specific complaints today.  She does have dyspnea with extensive exertion, but is up and down steps without difficulty and watches grandchildren with enthusiasm.  She denies chest pain, orthopnea, pnd, syncope.  She has occasionally dizziness, and has history of falling when her BP goes too low, so her primary doctor lowered her ramipril dose to 2.5mg daily from 5mg.  Denies palpitations, claudication.      Review of her BP/HR shows a trend of normotensive with HR >90 at all of her clinic visits.      Onco-Cardiology Measurements:  Historical Measurements from Previous Study  2D EF (Biplane)                     53%  3D EF                               55%  Global Longitudinal Strain (GLS) -11.9%     Current Measurements  2D EF (Biplane)                     40%  3D EF                               50%  Global Longitudinal Strain (GLS) -14.8%   1. Left ventricular systolic function is mildly decreased with a 40-45% estimated ejection fraction.   2. Mild aortic valve regurgitation.   3. There is global hypokinesis of the left ventricle with minor regional variations.   4. There is moderate dilatation of the ascending aorta    Ascending aorta measurements:   By ECHO:    10/12/23 4.2cm   3/2/2023 3.93 cm  9/1/22 4.02  6/2/22 3.81  9/7/21 4.17    By CT: (incidental during surveillance CT scan)  9/27/23 4.3 cm.   No other mentions prior studies    Risk factors: Former Smoker 30pk years  Family Hx: Father +s/p CABG, Mother +? aneurysm, Sister-?SCD s/p normal stress test    Review of Systems   Constitutional: Negative.   Cardiovascular:  Positive for dyspnea on exertion.   Respiratory:  Positive for shortness of breath. Negative for cough.    Musculoskeletal:  Positive for joint pain.   Gastrointestinal: Negative.   "Negative for melena.   Genitourinary:  Negative for hematuria.   Psychiatric/Behavioral: Negative.         Objective   Constitutional:       Appearance: Not in distress. Frail.   Neck:      Vascular: JVD normal.   Pulmonary:      Effort: Pulmonary effort is normal.      Breath sounds: Normal breath sounds.   Cardiovascular:      PMI at left midclavicular line. Tachycardia present. Regular rhythm. Normal S1. Normal S2.       Murmurs: There is a grade 2/4 low frequency diastolic murmur at the LLSB. The intensity increases with Valsalva. The intensity decreases with respiration.   Abdominal:      General: Bowel sounds are normal.   Musculoskeletal: Normal range of motion.      Cervical back: Neck supple. Skin:     General: Skin is warm and dry.   Neurological:      General: No focal deficit present.      Mental Status: Alert and oriented to person, place and time.         Lab Review:   Last cbc/chem reviewed  Lab Results   Component Value Date    CHOL 134 11/30/2021    TRIG 66 11/30/2021    HDL 62.8 11/30/2021       Assessment/Plan:  78 yo CF, prior smoker, with h/o metastatic breast cancer on lifelong treatment with decrease LVEF, moderately dilated ascending aorta.  The LVEF has decreased compared with prior TTE since 2020.  The GLS is stable.  The ascending aorta appears grossly stable since 2021, although now on echo she has mild AI and soft diastolic murmur on exam.  There is no clinical sign of HF, NYHA Class I.  No other symptoms.  Does have ROPER but is quite active.     CV risk mitigation/LV systolic dysfunction:  \"robust vascular calcification on CT.\"    The patient continues on low-dose aspirin, high intensity statin therapy with atorvastatin 40 mg daily, ramipril 2.5mg daily - she has h/o falls from hypotension.  She is tachycardic on exam, review of clinic visits shows stable HR 90s - low 100s  NYHA Class IC  - continue current dose of ramipril  - has indication for low dose beta blocker, but did not " tolerate carvedilol - could use very low dose metoprolol.  Given mild AI and decreased EF, will discuss with Dr Pack.     - no indication for diuretics  - instructed patient to monitor HR/BP and keep diary, to call if she has any increase in her dyspnea with activity, chest pain, fainting, dizziness, or swelling  - recheck echo in 3 months instead of 6    **Above discussed with Dr Pack 10/16/2023 - will add low dose coreg 3.25 twice daily, now, then jardiance in 2 weeks.  Keep scheduled echo for 3 months instead of 6 and follow up in Dr Pack's clinic after echo**

## 2023-10-12 NOTE — H&P (VIEW-ONLY)
Chief Complaint:  Follow-up long term cardiotoxic medication    HPI 78 yo CF with h/o long term cardiotoxic chemo, COPD, stage IV breast cancer on lifelong herceptin/presents for routine follow up.  She has no specific complaints today.  She does have dyspnea with extensive exertion, but is up and down steps without difficulty and watches grandchildren with enthusiasm.  She denies chest pain, orthopnea, pnd, syncope.  She has occasionally dizziness, and has history of falling when her BP goes too low, so her primary doctor lowered her ramipril dose to 2.5mg daily from 5mg.  Denies palpitations, claudication.      Review of her BP/HR shows a trend of normotensive with HR >90 at all of her clinic visits.      Onco-Cardiology Measurements:  Historical Measurements from Previous Study  2D EF (Biplane)                     53%  3D EF                               55%  Global Longitudinal Strain (GLS) -11.9%     Current Measurements  2D EF (Biplane)                     40%  3D EF                               50%  Global Longitudinal Strain (GLS) -14.8%   1. Left ventricular systolic function is mildly decreased with a 40-45% estimated ejection fraction.   2. Mild aortic valve regurgitation.   3. There is global hypokinesis of the left ventricle with minor regional variations.   4. There is moderate dilatation of the ascending aorta    Ascending aorta measurements:   By ECHO:    10/12/23 4.2cm   3/2/2023 3.93 cm  9/1/22 4.02  6/2/22 3.81  9/7/21 4.17    By CT: (incidental during surveillance CT scan)  9/27/23 4.3 cm.   No other mentions prior studies    Risk factors: Former Smoker 30pk years  Family Hx: Father +s/p CABG, Mother +? aneurysm, Sister-?SCD s/p normal stress test    Review of Systems   Constitutional: Negative.   Cardiovascular:  Positive for dyspnea on exertion.   Respiratory:  Positive for shortness of breath. Negative for cough.    Musculoskeletal:  Positive for joint pain.   Gastrointestinal: Negative.   "Negative for melena.   Genitourinary:  Negative for hematuria.   Psychiatric/Behavioral: Negative.         Objective   Constitutional:       Appearance: Not in distress. Frail.   Neck:      Vascular: JVD normal.   Pulmonary:      Effort: Pulmonary effort is normal.      Breath sounds: Normal breath sounds.   Cardiovascular:      PMI at left midclavicular line. Tachycardia present. Regular rhythm. Normal S1. Normal S2.       Murmurs: There is a grade 2/4 low frequency diastolic murmur at the LLSB. The intensity increases with Valsalva. The intensity decreases with respiration.   Abdominal:      General: Bowel sounds are normal.   Musculoskeletal: Normal range of motion.      Cervical back: Neck supple. Skin:     General: Skin is warm and dry.   Neurological:      General: No focal deficit present.      Mental Status: Alert and oriented to person, place and time.         Lab Review:   Last cbc/chem reviewed  Lab Results   Component Value Date    CHOL 134 11/30/2021    TRIG 66 11/30/2021    HDL 62.8 11/30/2021       Assessment/Plan:  76 yo CF, prior smoker, with h/o metastatic breast cancer on lifelong treatment with decrease LVEF, moderately dilated ascending aorta.  The LVEF has decreased compared with prior TTE since 2020.  The GLS is stable.  The ascending aorta appears grossly stable since 2021, although now on echo she has mild AI and soft diastolic murmur on exam.  There is no clinical sign of HF, NYHA Class I.  No other symptoms.  Does have ROPER but is quite active.     CV risk mitigation/LV systolic dysfunction:  \"robust vascular calcification on CT.\"    The patient continues on low-dose aspirin, high intensity statin therapy with atorvastatin 40 mg daily, ramipril 2.5mg daily - she has h/o falls from hypotension.  She is tachycardic on exam, review of clinic visits shows stable HR 90s - low 100s  NYHA Class IC  - continue current dose of ramipril  - has indication for low dose beta blocker, but did not " tolerate carvedilol - could use very low dose metoprolol.  Given mild AI and decreased EF, will discuss with Dr Pack.     - no indication for diuretics  - instructed patient to monitor HR/BP and keep diary, to call if she has any increase in her dyspnea with activity, chest pain, fainting, dizziness, or swelling  - recheck echo in 3 months instead of 6    **Above discussed with Dr Pack 10/16/2023 - will add low dose coreg 3.25 twice daily, now, then jardiance in 2 weeks.  Keep scheduled echo for 3 months instead of 6 and follow up in Dr Pack's clinic after echo**

## 2023-10-12 NOTE — PATIENT INSTRUCTIONS
I will discuss your case with Dr Pack.  Please take your blood pressure and heart rate every day and write it down.    Please report any increase in your shortness of breath or any other symptoms.     Return for echo in approximately 3 months     We will call you with the team's recommendations.

## 2023-10-16 ENCOUNTER — HOSPITAL ENCOUNTER (OUTPATIENT)
Dept: RADIOLOGY | Facility: HOSPITAL | Age: 78
Discharge: HOME | End: 2023-10-16
Payer: MEDICARE

## 2023-10-16 VITALS
TEMPERATURE: 98.1 F | SYSTOLIC BLOOD PRESSURE: 98 MMHG | BODY MASS INDEX: 23.23 KG/M2 | HEART RATE: 77 BPM | WEIGHT: 123.02 LBS | HEIGHT: 61 IN | RESPIRATION RATE: 18 BRPM | OXYGEN SATURATION: 98 % | DIASTOLIC BLOOD PRESSURE: 62 MMHG

## 2023-10-16 DIAGNOSIS — I50.42 CHRONIC COMBINED SYSTOLIC AND DIASTOLIC HEART FAILURE (MULTI): Primary | ICD-10-CM

## 2023-10-16 DIAGNOSIS — C50.919 BREAST CANCER METASTASIZED TO MULTIPLE SITES, UNSPECIFIED LATERALITY (MULTI): ICD-10-CM

## 2023-10-16 PROCEDURE — 36589 REMOVAL TUNNELED CV CATH: CPT

## 2023-10-16 PROCEDURE — 2500000004 HC RX 250 GENERAL PHARMACY W/ HCPCS (ALT 636 FOR OP/ED): Performed by: STUDENT IN AN ORGANIZED HEALTH CARE EDUCATION/TRAINING PROGRAM

## 2023-10-16 PROCEDURE — 99152 MOD SED SAME PHYS/QHP 5/>YRS: CPT | Performed by: STUDENT IN AN ORGANIZED HEALTH CARE EDUCATION/TRAINING PROGRAM

## 2023-10-16 PROCEDURE — 36590 REMOVAL TUNNELED CV CATH: CPT | Performed by: STUDENT IN AN ORGANIZED HEALTH CARE EDUCATION/TRAINING PROGRAM

## 2023-10-16 PROCEDURE — 36589 REMOVAL TUNNELED CV CATH: CPT | Performed by: STUDENT IN AN ORGANIZED HEALTH CARE EDUCATION/TRAINING PROGRAM

## 2023-10-16 RX ORDER — ONDANSETRON HYDROCHLORIDE 2 MG/ML
INJECTION, SOLUTION INTRAVENOUS AS NEEDED
Status: COMPLETED | OUTPATIENT
Start: 2023-10-16 | End: 2023-10-16

## 2023-10-16 RX ORDER — FENTANYL CITRATE 50 UG/ML
INJECTION, SOLUTION INTRAMUSCULAR; INTRAVENOUS AS NEEDED
Status: COMPLETED | OUTPATIENT
Start: 2023-10-16 | End: 2023-10-16

## 2023-10-16 RX ORDER — MIDAZOLAM HYDROCHLORIDE 1 MG/ML
INJECTION INTRAMUSCULAR; INTRAVENOUS AS NEEDED
Status: COMPLETED | OUTPATIENT
Start: 2023-10-16 | End: 2023-10-16

## 2023-10-16 RX ORDER — SODIUM CHLORIDE 9 MG/ML
INJECTION, SOLUTION INTRAVENOUS CONTINUOUS PRN
Status: COMPLETED | OUTPATIENT
Start: 2023-10-16 | End: 2023-10-16

## 2023-10-16 RX ORDER — CARVEDILOL 3.12 MG/1
3.12 TABLET ORAL
Qty: 180 TABLET | Refills: 3 | Status: SHIPPED | OUTPATIENT
Start: 2023-10-16 | End: 2024-10-15

## 2023-10-16 RX ADMIN — SODIUM CHLORIDE 50 ML/HR: 9 INJECTION, SOLUTION INTRAVENOUS at 10:30

## 2023-10-16 RX ADMIN — ONDANSETRON 4 MG: 2 INJECTION INTRAMUSCULAR; INTRAVENOUS at 10:32

## 2023-10-16 RX ADMIN — FENTANYL CITRATE 50 MCG: 50 INJECTION, SOLUTION INTRAMUSCULAR; INTRAVENOUS at 10:44

## 2023-10-16 RX ADMIN — Medication 3 L/MIN: at 10:30

## 2023-10-16 RX ADMIN — MIDAZOLAM HYDROCHLORIDE 1 MG: 1 INJECTION, SOLUTION INTRAMUSCULAR; INTRAVENOUS at 10:44

## 2023-10-16 ASSESSMENT — PAIN SCALES - GENERAL
PAINLEVEL_OUTOF10: 0 - NO PAIN

## 2023-10-16 ASSESSMENT — PAIN - FUNCTIONAL ASSESSMENT: PAIN_FUNCTIONAL_ASSESSMENT: 0-10

## 2023-10-16 NOTE — Clinical Note
The site was marked. Prepped: right chest. Prepped with: ChloraPrep. The patient was draped. Tech prepping pt.

## 2023-10-16 NOTE — POST-PROCEDURE NOTE
Interventional Radiology Brief Postprocedure Note    Attending: Emily Ray MD     Assistant: none    Diagnosis: cancer    Description of procedure: mediport removal     Anesthesia:  moderate sedation    Complications: None    Estimated Blood Loss: none    Medications (Filter: Administrations occurring from 1054 to 1113 on 10/16/23) As of 10/16/23 1113      sodium chloride 0.9% infusion (mL/hr) Total volume:  Not documented*   *Total volume has not been documented. View each administration to see the amount administered.     Date/Time Rate/Dose/Volume Action       10/16/23  1113  Stopped                   No specimens collected      See detailed result report with images in PACS.    The patient tolerated the procedure well without incident or complication and is in stable condition.

## 2023-10-16 NOTE — PRE-PROCEDURE NOTE
Interventional Radiology Preprocedure Note    Indication for procedure: The encounter diagnosis was Breast cancer metastasized to multiple sites, unspecified laterality (CMS/HCC).    Relevant review of systems: NA    Relevant Labs:   Lab Results   Component Value Date    CREATININE CANCELED 09/27/2023       Planned Sedation/Anesthesia: Moderate    Airway assessment: normal    Directed physical examination:    Inspection:  JVD: none  Precordial bulge: none  Palpation:   Quality: excellent  Precordium: normal impulses  Auscultation:  Quality of auscultation: excellent  S1: normal intensity and splitting  S2: normal intensity and splitting  Clicks: none  Gallops: none  Rub: none  Systolic murmurs: none  Diastolic murmurs: none      Mallampati: II (hard and soft palate, upper portion of tonsils anduvula visible)    ASA Score: ASA 2 - Patient with mild systemic disease with no functional limitations    Benefits, risks and alternatives of procedure and planned sedation have been discussed with the patient and/or their representative. All questions answered and they agree to proceed.

## 2023-10-23 ENCOUNTER — TELEPHONE (OUTPATIENT)
Dept: CARDIOLOGY | Facility: CLINIC | Age: 78
End: 2023-10-23
Payer: MEDICARE

## 2023-10-25 ENCOUNTER — INFUSION (OUTPATIENT)
Dept: HEMATOLOGY/ONCOLOGY | Facility: CLINIC | Age: 78
End: 2023-10-25
Payer: MEDICARE

## 2023-10-25 ENCOUNTER — TELEPHONE (OUTPATIENT)
Dept: HEMATOLOGY/ONCOLOGY | Facility: CLINIC | Age: 78
End: 2023-10-25

## 2023-10-25 VITALS
BODY MASS INDEX: 23.29 KG/M2 | TEMPERATURE: 98.1 F | DIASTOLIC BLOOD PRESSURE: 61 MMHG | WEIGHT: 123.02 LBS | OXYGEN SATURATION: 97 % | RESPIRATION RATE: 16 BRPM | HEART RATE: 88 BPM | SYSTOLIC BLOOD PRESSURE: 104 MMHG

## 2023-10-25 DIAGNOSIS — C77.0 METASTASIS TO SUPRACLAVICULAR LYMPH NODE (MULTI): Primary | ICD-10-CM

## 2023-10-25 DIAGNOSIS — Z17.1 MALIGNANT NEOPLASM OF LEFT BREAST IN FEMALE, ESTROGEN RECEPTOR NEGATIVE, UNSPECIFIED SITE OF BREAST (MULTI): ICD-10-CM

## 2023-10-25 DIAGNOSIS — C50.912 MALIGNANT NEOPLASM OF LEFT BREAST IN FEMALE, ESTROGEN RECEPTOR NEGATIVE, UNSPECIFIED SITE OF BREAST (MULTI): ICD-10-CM

## 2023-10-25 PROCEDURE — 2500000004 HC RX 250 GENERAL PHARMACY W/ HCPCS (ALT 636 FOR OP/ED): Performed by: STUDENT IN AN ORGANIZED HEALTH CARE EDUCATION/TRAINING PROGRAM

## 2023-10-25 PROCEDURE — 96401 CHEMO ANTI-NEOPL SQ/IM: CPT

## 2023-10-25 RX ORDER — FAMOTIDINE 10 MG/ML
20 INJECTION INTRAVENOUS ONCE AS NEEDED
Status: DISCONTINUED | OUTPATIENT
Start: 2023-10-25 | End: 2023-10-25 | Stop reason: HOSPADM

## 2023-10-25 RX ORDER — DIPHENHYDRAMINE HYDROCHLORIDE 50 MG/ML
50 INJECTION INTRAMUSCULAR; INTRAVENOUS AS NEEDED
Status: DISCONTINUED | OUTPATIENT
Start: 2023-10-25 | End: 2023-10-25 | Stop reason: HOSPADM

## 2023-10-25 RX ORDER — HEPARIN 100 UNIT/ML
SYRINGE INTRAVENOUS
Status: DISCONTINUED
Start: 2023-10-25 | End: 2023-10-25 | Stop reason: HOSPADM

## 2023-10-25 RX ORDER — PROCHLORPERAZINE EDISYLATE 5 MG/ML
10 INJECTION INTRAMUSCULAR; INTRAVENOUS EVERY 6 HOURS PRN
Status: DISCONTINUED | OUTPATIENT
Start: 2023-10-25 | End: 2023-10-25 | Stop reason: HOSPADM

## 2023-10-25 RX ORDER — ALBUTEROL SULFATE 0.83 MG/ML
3 SOLUTION RESPIRATORY (INHALATION) AS NEEDED
Status: DISCONTINUED | OUTPATIENT
Start: 2023-10-25 | End: 2023-10-25 | Stop reason: HOSPADM

## 2023-10-25 RX ORDER — EPINEPHRINE 0.3 MG/.3ML
0.3 INJECTION SUBCUTANEOUS EVERY 5 MIN PRN
Status: DISCONTINUED | OUTPATIENT
Start: 2023-10-25 | End: 2023-10-25 | Stop reason: HOSPADM

## 2023-10-25 RX ORDER — HEPARIN 100 UNIT/ML
500 SYRINGE INTRAVENOUS AS NEEDED
OUTPATIENT
Start: 2023-10-25

## 2023-10-25 RX ORDER — HEPARIN SODIUM,PORCINE/PF 10 UNIT/ML
50 SYRINGE (ML) INTRAVENOUS AS NEEDED
OUTPATIENT
Start: 2023-10-25

## 2023-10-25 RX ORDER — PROCHLORPERAZINE MALEATE 10 MG
10 TABLET ORAL EVERY 6 HOURS PRN
Status: DISCONTINUED | OUTPATIENT
Start: 2023-10-25 | End: 2023-10-25 | Stop reason: HOSPADM

## 2023-10-25 RX ADMIN — PERTUZUMAB, TRASTUZUMAB, AND HYALURONIDASE-ZZXF 10 ML: 600; 600; 2000 INJECTION, SOLUTION SUBCUTANEOUS at 11:41

## 2023-10-25 ASSESSMENT — PAIN SCALES - GENERAL: PAINLEVEL: 0-NO PAIN

## 2023-11-06 ENCOUNTER — OFFICE (OUTPATIENT)
Dept: URBAN - METROPOLITAN AREA CLINIC 27 | Facility: CLINIC | Age: 78
End: 2023-11-06
Payer: MEDICARE

## 2023-11-06 VITALS — WEIGHT: 124 LBS | HEIGHT: 63 IN | TEMPERATURE: 97.4 F

## 2023-11-06 DIAGNOSIS — K22.2 ESOPHAGEAL OBSTRUCTION: ICD-10-CM

## 2023-11-06 DIAGNOSIS — K59.09 OTHER CONSTIPATION: ICD-10-CM

## 2023-11-06 DIAGNOSIS — K22.70 BARRETT'S ESOPHAGUS WITHOUT DYSPLASIA: ICD-10-CM

## 2023-11-06 DIAGNOSIS — Z80.0 FAMILY HISTORY OF MALIGNANT NEOPLASM OF DIGESTIVE ORGANS: ICD-10-CM

## 2023-11-06 DIAGNOSIS — Z86.010 PERSONAL HISTORY OF COLONIC POLYPS: ICD-10-CM

## 2023-11-06 DIAGNOSIS — K21.9 GASTRO-ESOPHAGEAL REFLUX DISEASE WITHOUT ESOPHAGITIS: ICD-10-CM

## 2023-11-06 PROCEDURE — 99214 OFFICE O/P EST MOD 30 MIN: CPT | Performed by: INTERNAL MEDICINE

## 2023-11-08 ENCOUNTER — OFFICE VISIT (OUTPATIENT)
Dept: CARDIOLOGY | Facility: CLINIC | Age: 78
End: 2023-11-08
Payer: MEDICARE

## 2023-11-08 VITALS
OXYGEN SATURATION: 99 % | DIASTOLIC BLOOD PRESSURE: 82 MMHG | TEMPERATURE: 97.3 F | SYSTOLIC BLOOD PRESSURE: 113 MMHG | HEART RATE: 99 BPM | RESPIRATION RATE: 18 BRPM | BODY MASS INDEX: 23.2 KG/M2 | WEIGHT: 122.58 LBS

## 2023-11-08 DIAGNOSIS — I77.819 DILATION OF AORTA (CMS-HCC): Primary | ICD-10-CM

## 2023-11-08 DIAGNOSIS — I42.7 CARDIOMYOPATHY DUE TO DRUG AND EXTERNAL AGENT (MULTI): ICD-10-CM

## 2023-11-08 DIAGNOSIS — Z92.21 STATUS POST ADMINISTRATION OF CARDIOTOXIC CHEMOTHERAPY: ICD-10-CM

## 2023-11-08 PROCEDURE — 1159F MED LIST DOCD IN RCRD: CPT | Performed by: STUDENT IN AN ORGANIZED HEALTH CARE EDUCATION/TRAINING PROGRAM

## 2023-11-08 PROCEDURE — 1126F AMNT PAIN NOTED NONE PRSNT: CPT | Performed by: STUDENT IN AN ORGANIZED HEALTH CARE EDUCATION/TRAINING PROGRAM

## 2023-11-08 PROCEDURE — 3074F SYST BP LT 130 MM HG: CPT | Performed by: STUDENT IN AN ORGANIZED HEALTH CARE EDUCATION/TRAINING PROGRAM

## 2023-11-08 PROCEDURE — 99204 OFFICE O/P NEW MOD 45 MIN: CPT | Performed by: STUDENT IN AN ORGANIZED HEALTH CARE EDUCATION/TRAINING PROGRAM

## 2023-11-08 PROCEDURE — 99214 OFFICE O/P EST MOD 30 MIN: CPT | Performed by: STUDENT IN AN ORGANIZED HEALTH CARE EDUCATION/TRAINING PROGRAM

## 2023-11-08 PROCEDURE — 1160F RVW MEDS BY RX/DR IN RCRD: CPT | Performed by: STUDENT IN AN ORGANIZED HEALTH CARE EDUCATION/TRAINING PROGRAM

## 2023-11-08 PROCEDURE — 3079F DIAST BP 80-89 MM HG: CPT | Performed by: STUDENT IN AN ORGANIZED HEALTH CARE EDUCATION/TRAINING PROGRAM

## 2023-11-08 PROCEDURE — 1036F TOBACCO NON-USER: CPT | Performed by: STUDENT IN AN ORGANIZED HEALTH CARE EDUCATION/TRAINING PROGRAM

## 2023-11-08 ASSESSMENT — PAIN SCALES - GENERAL: PAINLEVEL: 0-NO PAIN

## 2023-11-08 ASSESSMENT — ENCOUNTER SYMPTOMS
OCCASIONAL FEELINGS OF UNSTEADINESS: 0
LOSS OF SENSATION IN FEET: 0
DEPRESSION: 0

## 2023-11-08 NOTE — PROGRESS NOTES
Subjective   Linda C Hegarty is a 78 y.o. female     Patient has a medical history of Paget's disease of the left breast.  Left breast mastectomy with sentinel lymph node biopsy performed March 2012.  Has been on Herceptin/Perjeta since 2015; now on Phesgo (Pertuzumab/trastuzumab/hyaluronidase).    Echocardiogram dated October 2023 shows low normal ejection fraction on my review (45 to 50%).  Appears largely identical to echocardiogram dated March 2023.  Ascending aortic aneurysm measuring up to 4.3 cm noted on CT chest with contrast September 2023.  (Largely stable compared to scans dated January 2023 & October 2022).    On this visit patient denies any limitations with walking.  Denies any chest discomfort or shortness breath.  Denies any orthopnea/PND.  Has minimal lower extremity edema.  Notably was previously on higher doses of ramipril/Coreg with resultant dizziness and falls.  Has not had any of those complaints since down titration of those medications.    Current Outpatient Medications   Medication Instructions    amitriptyline (ELAVIL) 25 mg, oral, Nightly    ascorbic acid (VITAMIN C) 1,000 mg, oral    aspirin 81 mg chewable tablet 1 tablet, oral, Daily    atorvastatin (LIPITOR) 40 mg, oral, Nightly    calcium citrate (Calcitrate) 200 mg (950 mg) tablet 1 tablet, oral, Daily    carvedilol (COREG) 3.125 mg, oral, 2 times daily with meals    cetirizine (ZYRTEC) 10 mg, oral, Daily    diphenhydrAMINE (BENADRYL) 25 mg, oral, Nightly    famotidine (PEPCID) 40 mg, oral, Daily    fluticasone (Flonase) 50 mcg/actuation nasal spray 2 sprays, nasal, Daily PRN    Lactobacillus acidophilus (PROBIOTIC ORAL) oral, Chewable tablet    ondansetron (ZOFRAN) 4 mg, oral, Every 8 hours PRN    ondansetron (ZOFRAN) 8 mg, oral, Every 6 hours    ramipril (ALTACE) 2.5 mg, oral, Daily    sulfamethoxazole-trimethoprim (Bactrim) 400-80 mg tablet 0.5 tablets, oral, Daily         Review of Systems  A comprehensive review of systems was  negative.     Past Medical History:   Diagnosis Date    Malignant neoplasm of nipple and areola, unspecified female breast (CMS/HCC)     Pagets disease, breast    Other abnormal and inconclusive findings on diagnostic imaging of breast 10/24/2017    Abnormal mammogram of right breast    Other conditions influencing health status 09/10/2014    History of pregnancy    Personal history of other complications of pregnancy, childbirth and the puerperium 09/10/2014    History of spontaneous     Personal history of urinary (tract) infections 09/10/2014    History of recurrent UTI (urinary tract infection)       Past Surgical History:   Procedure Laterality Date    BLADDER SURGERY  09/10/2014    Bladder Surgery    HYSTERECTOMY  09/10/2014    Hysterectomy    IR CVC PORT REMOVAL  10/16/2023    IR CVC PORT REMOVAL 10/16/2023 Emily Ray MD PAR ANGIO    LYMPH NODE BIOPSY  2014    Biopsy Lymph Node    OTHER SURGICAL HISTORY  2014    Radical Mastectomy Left Breast    OTHER SURGICAL HISTORY  2017    Central Intravenous Catheter    OTHER SURGICAL HISTORY  09/10/2014    Complete Colonoscopy Rectum       Allergies   Allergen Reactions    Codeine Unknown    Meperidine Unknown    Morphine Unknown       Family History   Problem Relation Name Age of Onset    Diabetes Mother      Heart failure Father      Breast cancer Sister      Diabetes Other aunt        Objective   Visit Vitals  /82   Pulse 99   Temp 36.3 °C (97.3 °F) (Core)   Resp 18   Wt 55.6 kg (122 lb 9.2 oz)   SpO2 99%   BMI 23.20 kg/m²   OB Status Postmenopausal   Smoking Status Former   BSA 1.55 m²       Physical Exam  Visit Vitals  /82   Pulse 99   Temp 36.3 °C (97.3 °F) (Core)   Resp 18   Wt 55.6 kg (122 lb 9.2 oz)   SpO2 99%   BMI 23.20 kg/m²   OB Status Postmenopausal   Smoking Status Former   BSA 1.55 m²       General: awake, alert and oriented. No acute distress.   Skin: Skin is warm, dry and intact without rashes or  lesions. Appropriate color for ethnicity. Nail beds pink with no cyanosis or clubbing  HEENT: normocephalic, atraumatic; conjunctivae are clear without exudates or hemorrhage. Sclera is non-icteric. Eyelids are normal in appearance without swelling or lesions. Hearing intact. Nares are patent bilaterally. Moist mucous membranes.   Cardiovascular: Regular. No murmurs, gallops, or rubs are auscultated. S1 and S2 are heard and are of normal intensity. No JVD, no carotid bruits  Respiratory: Thorax symmetric. CTAB, breath sounds vesicular. No crackles, wheezes or ronchi.   Gastrointestinal: soft, non-distended, BS + x 4  Genitourinary: exam deferred  Musculoskeletal: moves all extremities  Extremities: pulses palpable bilaterally; no swelling or erythema; no edema  Neurological: alert & oriented x 3; no focal deficits  Psychiatric: appropriate mood and affect      Lab Review   Lab Results   Component Value Date    HGB CANCELED 09/26/2023    HGB 12.3 09/25/2023    HGB 11.6 (L) 08/04/2023    PLT CANCELED 09/26/2023    WBC CANCELED 09/26/2023    NA CANCELED 09/27/2023    K CANCELED 09/27/2023    CREATININE CANCELED 09/27/2023    CREATININE 1.18 (H) 09/25/2023    CREATININE 0.89 08/04/2023    BUN CANCELED 09/27/2023    CALCIUM CANCELED 09/27/2023    BNP 15 03/29/2023    TROPHS 4 03/29/2023    LDLF 58 11/30/2021        Assessment/Plan   Patient has a medical history of Paget's disease of the left breast.  Left breast mastectomy with sentinel lymph node biopsy performed March 2012.  Has been on Herceptin/Perjeta since 2015; now on Phesgo (Pertuzumab/trastuzumab/hyaluronidase).    Echocardiogram dated October 2023 shows low normal ejection fraction on my review (45 to 50%).  Appears largely identical to echocardiogram dated March 2023.  Ascending aortic aneurysm measuring up to 4.3 cm noted on CT chest with contrast September 2023.  (Largely stable compared to scans dated January 2023 & October 2022).    On this visit patient  denies any limitations with walking.  Denies any chest discomfort or shortness breath.  Denies any orthopnea/PND.  Has minimal lower extremity edema.  Notably was previously on higher doses of ramipril/Coreg with resultant dizziness and falls.  Has not had any of those complaints since down titration of those medications.    Plan:  -Monitoring cardiotoxic chemotherapy: While on trastuzumab-based therapies her EF has largely remained between 45-50%.  Continue ramipril/Coreg at this time.  Given history of dizziness we will hold off on further uptitration of GDMT.  She does not meet the universal definition of heart failure with no signs of congestion; normal biomarkers.  -Okay to receive trastuzumab-based therapies at this time.  Given long history of stability of EF on my review; okay to space echocardiograms about every 6 months for now.  I asked her to call us in case she experiences any worsening symptoms  -Needs repeat CT evaluation of her cancer; will monitor her aneurysm through those  -Blood pressure is well controlled  -RTC 6 months with echo as noted above     Lemuel Pack MD  Advanced Heart Failure/Transplant Cardiology  Cardio-Oncology  [facility]

## 2023-11-16 ENCOUNTER — INFUSION (OUTPATIENT)
Dept: HEMATOLOGY/ONCOLOGY | Facility: CLINIC | Age: 78
End: 2023-11-16
Payer: MEDICARE

## 2023-11-16 VITALS
DIASTOLIC BLOOD PRESSURE: 91 MMHG | HEART RATE: 83 BPM | TEMPERATURE: 97.3 F | RESPIRATION RATE: 16 BRPM | SYSTOLIC BLOOD PRESSURE: 147 MMHG | BODY MASS INDEX: 22.95 KG/M2 | OXYGEN SATURATION: 97 % | WEIGHT: 121.25 LBS

## 2023-11-16 DIAGNOSIS — Z17.1 MALIGNANT NEOPLASM OF LEFT BREAST IN FEMALE, ESTROGEN RECEPTOR NEGATIVE, UNSPECIFIED SITE OF BREAST (MULTI): ICD-10-CM

## 2023-11-16 DIAGNOSIS — C50.912 MALIGNANT NEOPLASM OF LEFT BREAST IN FEMALE, ESTROGEN RECEPTOR NEGATIVE, UNSPECIFIED SITE OF BREAST (MULTI): ICD-10-CM

## 2023-11-16 PROCEDURE — 2500000004 HC RX 250 GENERAL PHARMACY W/ HCPCS (ALT 636 FOR OP/ED): Performed by: STUDENT IN AN ORGANIZED HEALTH CARE EDUCATION/TRAINING PROGRAM

## 2023-11-16 PROCEDURE — 96401 CHEMO ANTI-NEOPL SQ/IM: CPT

## 2023-11-16 RX ORDER — PROCHLORPERAZINE EDISYLATE 5 MG/ML
10 INJECTION INTRAMUSCULAR; INTRAVENOUS EVERY 6 HOURS PRN
Status: DISCONTINUED | OUTPATIENT
Start: 2023-11-16 | End: 2023-11-16 | Stop reason: HOSPADM

## 2023-11-16 RX ORDER — FAMOTIDINE 10 MG/ML
20 INJECTION INTRAVENOUS ONCE AS NEEDED
Status: DISCONTINUED | OUTPATIENT
Start: 2023-11-16 | End: 2023-11-16 | Stop reason: HOSPADM

## 2023-11-16 RX ORDER — PROCHLORPERAZINE MALEATE 10 MG
10 TABLET ORAL EVERY 6 HOURS PRN
Status: DISCONTINUED | OUTPATIENT
Start: 2023-11-16 | End: 2023-11-16 | Stop reason: HOSPADM

## 2023-11-16 RX ORDER — ALBUTEROL SULFATE 0.83 MG/ML
3 SOLUTION RESPIRATORY (INHALATION) AS NEEDED
Status: DISCONTINUED | OUTPATIENT
Start: 2023-11-16 | End: 2023-11-16 | Stop reason: HOSPADM

## 2023-11-16 RX ORDER — DIPHENHYDRAMINE HYDROCHLORIDE 50 MG/ML
50 INJECTION INTRAMUSCULAR; INTRAVENOUS AS NEEDED
Status: DISCONTINUED | OUTPATIENT
Start: 2023-11-16 | End: 2023-11-16 | Stop reason: HOSPADM

## 2023-11-16 RX ORDER — EPINEPHRINE 0.3 MG/.3ML
0.3 INJECTION SUBCUTANEOUS EVERY 5 MIN PRN
Status: DISCONTINUED | OUTPATIENT
Start: 2023-11-16 | End: 2023-11-16 | Stop reason: HOSPADM

## 2023-11-16 RX ADMIN — PERTUZUMAB, TRASTUZUMAB, AND HYALURONIDASE-ZZXF 10 ML: 600; 600; 2000 INJECTION, SOLUTION SUBCUTANEOUS at 15:49

## 2023-11-16 ASSESSMENT — PAIN SCALES - GENERAL: PAINLEVEL: 0-NO PAIN

## 2023-11-16 NOTE — PROGRESS NOTES
Patient independently ambulatory off unit in NAD and without complaints.  Gait steady.  Call back instructions reviewed.  Patient verbalized understanding.

## 2023-11-28 DIAGNOSIS — I10 ESSENTIAL HYPERTENSION: Primary | ICD-10-CM

## 2023-11-28 DIAGNOSIS — E78.2 MIXED HYPERLIPIDEMIA: ICD-10-CM

## 2023-12-01 RX ORDER — RAMIPRIL 2.5 MG/1
2.5 CAPSULE ORAL DAILY
Qty: 90 CAPSULE | Refills: 3 | Status: SHIPPED | OUTPATIENT
Start: 2023-12-01 | End: 2024-11-30

## 2023-12-01 RX ORDER — ATORVASTATIN CALCIUM 40 MG/1
40 TABLET, FILM COATED ORAL NIGHTLY
Qty: 90 TABLET | Refills: 3 | Status: SHIPPED | OUTPATIENT
Start: 2023-12-01 | End: 2024-11-30

## 2023-12-05 RX ORDER — TRIAMCINOLONE ACETONIDE 0.25 MG/ML
1 LOTION TOPICAL 2 TIMES DAILY
COMMUNITY
End: 2024-02-07 | Stop reason: ALTCHOICE

## 2023-12-05 RX ORDER — PROMETHAZINE HYDROCHLORIDE 25 MG/1
TABLET ORAL
COMMUNITY
Start: 2023-05-24 | End: 2024-02-07 | Stop reason: ALTCHOICE

## 2023-12-05 RX ORDER — LANSOPRAZOLE 30 MG/1
30 CAPSULE, DELAYED RELEASE ORAL 2 TIMES DAILY
COMMUNITY

## 2023-12-05 RX ORDER — RIZATRIPTAN BENZOATE 10 MG/1
TABLET, ORALLY DISINTEGRATING ORAL
COMMUNITY
End: 2024-04-03 | Stop reason: ALTCHOICE

## 2023-12-05 RX ORDER — PREDNISONE 20 MG/1
TABLET ORAL
COMMUNITY
Start: 2023-04-24 | End: 2024-02-07 | Stop reason: ALTCHOICE

## 2023-12-05 RX ORDER — TRIAMCINOLONE ACETONIDE 1 MG/G
CREAM TOPICAL
COMMUNITY
Start: 2015-08-07 | End: 2024-02-07 | Stop reason: ALTCHOICE

## 2023-12-05 RX ORDER — OXYCODONE AND ACETAMINOPHEN 5; 325 MG/1; MG/1
TABLET ORAL
COMMUNITY
Start: 2023-11-02 | End: 2024-02-07 | Stop reason: ALTCHOICE

## 2023-12-05 RX ORDER — DEXLANSOPRAZOLE 60 MG/1
CAPSULE, DELAYED RELEASE ORAL
COMMUNITY
Start: 2022-02-22 | End: 2024-04-03 | Stop reason: ALTCHOICE

## 2023-12-05 RX ORDER — NITROFURANTOIN 25; 75 MG/1; MG/1
CAPSULE ORAL
COMMUNITY
Start: 2023-08-04

## 2023-12-05 RX ORDER — AMOXICILLIN 500 MG/1
500 CAPSULE ORAL 3 TIMES DAILY
COMMUNITY
Start: 2023-10-30

## 2023-12-05 RX ORDER — CONJUGATED ESTROGENS 0.62 MG/G
CREAM VAGINAL
COMMUNITY
Start: 2022-12-13

## 2023-12-05 RX ORDER — FLUTICASONE PROPIONATE 50 MCG
SPRAY, SUSPENSION (ML) NASAL
COMMUNITY
End: 2023-12-05 | Stop reason: SDUPTHER

## 2023-12-05 RX ORDER — HYDROXYZINE HYDROCHLORIDE 25 MG/1
TABLET, FILM COATED ORAL
COMMUNITY
Start: 2015-08-07 | End: 2024-04-03 | Stop reason: ALTCHOICE

## 2023-12-05 RX ORDER — ESTRADIOL 10 UG/1
INSERT VAGINAL
COMMUNITY
Start: 2015-08-16 | End: 2023-12-05 | Stop reason: SDUPTHER

## 2023-12-05 RX ORDER — MINERAL OIL
180 ENEMA (ML) RECTAL
COMMUNITY

## 2023-12-06 ENCOUNTER — INFUSION (OUTPATIENT)
Dept: HEMATOLOGY/ONCOLOGY | Facility: CLINIC | Age: 78
End: 2023-12-06
Payer: MEDICARE

## 2023-12-06 ENCOUNTER — LAB (OUTPATIENT)
Dept: LAB | Facility: CLINIC | Age: 78
End: 2023-12-06
Payer: MEDICARE

## 2023-12-06 VITALS
BODY MASS INDEX: 23.62 KG/M2 | OXYGEN SATURATION: 98 % | TEMPERATURE: 97 F | DIASTOLIC BLOOD PRESSURE: 91 MMHG | WEIGHT: 124.78 LBS | SYSTOLIC BLOOD PRESSURE: 132 MMHG | RESPIRATION RATE: 14 BRPM | HEART RATE: 93 BPM

## 2023-12-06 DIAGNOSIS — Z17.1 MALIGNANT NEOPLASM OF LEFT BREAST IN FEMALE, ESTROGEN RECEPTOR NEGATIVE, UNSPECIFIED SITE OF BREAST (MULTI): ICD-10-CM

## 2023-12-06 DIAGNOSIS — C50.912 MALIGNANT NEOPLASM OF LEFT BREAST IN FEMALE, ESTROGEN RECEPTOR NEGATIVE, UNSPECIFIED SITE OF BREAST (MULTI): ICD-10-CM

## 2023-12-06 DIAGNOSIS — C77.0 METASTASIS TO SUPRACLAVICULAR LYMPH NODE (MULTI): ICD-10-CM

## 2023-12-06 DIAGNOSIS — C77.0 METASTASIS TO SUPRACLAVICULAR LYMPH NODE (MULTI): Primary | ICD-10-CM

## 2023-12-06 LAB
BASOPHILS # BLD AUTO: 0.04 X10*3/UL (ref 0–0.1)
BASOPHILS NFR BLD AUTO: 0.6 %
EOSINOPHIL # BLD AUTO: 0.29 X10*3/UL (ref 0–0.4)
EOSINOPHIL NFR BLD AUTO: 4.5 %
ERYTHROCYTE [DISTWIDTH] IN BLOOD BY AUTOMATED COUNT: 13.3 % (ref 11.5–14.5)
HCT VFR BLD AUTO: 37.3 % (ref 36–46)
HGB BLD-MCNC: 11.8 G/DL (ref 12–16)
IMM GRANULOCYTES # BLD AUTO: 0.01 X10*3/UL (ref 0–0.5)
IMM GRANULOCYTES NFR BLD AUTO: 0.2 % (ref 0–0.9)
LYMPHOCYTES # BLD AUTO: 1.88 X10*3/UL (ref 0.8–3)
LYMPHOCYTES NFR BLD AUTO: 29.5 %
MCH RBC QN AUTO: 32.2 PG (ref 26–34)
MCHC RBC AUTO-ENTMCNC: 31.6 G/DL (ref 32–36)
MCV RBC AUTO: 102 FL (ref 80–100)
MONOCYTES # BLD AUTO: 0.53 X10*3/UL (ref 0.05–0.8)
MONOCYTES NFR BLD AUTO: 8.3 %
NEUTROPHILS # BLD AUTO: 3.63 X10*3/UL (ref 1.6–5.5)
NEUTROPHILS NFR BLD AUTO: 56.9 %
NRBC BLD-RTO: ABNORMAL /100{WBCS}
PLATELET # BLD AUTO: 253 X10*3/UL (ref 150–450)
RBC # BLD AUTO: 3.66 X10*6/UL (ref 4–5.2)
WBC # BLD AUTO: 6.4 X10*3/UL (ref 4.4–11.3)

## 2023-12-06 PROCEDURE — 80053 COMPREHEN METABOLIC PANEL: CPT

## 2023-12-06 PROCEDURE — 96401 CHEMO ANTI-NEOPL SQ/IM: CPT

## 2023-12-06 PROCEDURE — 36415 COLL VENOUS BLD VENIPUNCTURE: CPT

## 2023-12-06 PROCEDURE — 2500000004 HC RX 250 GENERAL PHARMACY W/ HCPCS (ALT 636 FOR OP/ED): Mod: JZ,TB | Performed by: STUDENT IN AN ORGANIZED HEALTH CARE EDUCATION/TRAINING PROGRAM

## 2023-12-06 PROCEDURE — 85025 COMPLETE CBC W/AUTO DIFF WBC: CPT

## 2023-12-06 RX ORDER — PROCHLORPERAZINE MALEATE 10 MG
10 TABLET ORAL EVERY 6 HOURS PRN
Status: DISCONTINUED | OUTPATIENT
Start: 2023-12-06 | End: 2023-12-06 | Stop reason: HOSPADM

## 2023-12-06 RX ORDER — ALBUTEROL SULFATE 0.83 MG/ML
3 SOLUTION RESPIRATORY (INHALATION) AS NEEDED
Status: DISCONTINUED | OUTPATIENT
Start: 2023-12-06 | End: 2023-12-06 | Stop reason: HOSPADM

## 2023-12-06 RX ORDER — FAMOTIDINE 10 MG/ML
20 INJECTION INTRAVENOUS ONCE AS NEEDED
Status: DISCONTINUED | OUTPATIENT
Start: 2023-12-06 | End: 2023-12-06 | Stop reason: HOSPADM

## 2023-12-06 RX ORDER — EPINEPHRINE 0.3 MG/.3ML
0.3 INJECTION SUBCUTANEOUS EVERY 5 MIN PRN
Status: DISCONTINUED | OUTPATIENT
Start: 2023-12-06 | End: 2023-12-06 | Stop reason: HOSPADM

## 2023-12-06 RX ORDER — DIPHENHYDRAMINE HYDROCHLORIDE 50 MG/ML
50 INJECTION INTRAMUSCULAR; INTRAVENOUS AS NEEDED
Status: DISCONTINUED | OUTPATIENT
Start: 2023-12-06 | End: 2023-12-06 | Stop reason: HOSPADM

## 2023-12-06 RX ORDER — PROCHLORPERAZINE EDISYLATE 5 MG/ML
10 INJECTION INTRAMUSCULAR; INTRAVENOUS EVERY 6 HOURS PRN
Status: DISCONTINUED | OUTPATIENT
Start: 2023-12-06 | End: 2023-12-06 | Stop reason: HOSPADM

## 2023-12-06 RX ADMIN — PERTUZUMAB, TRASTUZUMAB, AND HYALURONIDASE-ZZXF 10 ML: 600; 600; 2000 INJECTION, SOLUTION SUBCUTANEOUS at 14:36

## 2023-12-06 ASSESSMENT — PAIN SCALES - GENERAL: PAINLEVEL: 0-NO PAIN

## 2023-12-06 NOTE — SIGNIFICANT EVENT
12/06/23 1343   Prechemo Checklist   Has the patient been in the hospital, ED, or urgent care since last date of service No   Chemo/Immuno Consent Signed Yes   Protocol/Indications Verified Yes   Confirmed to previous date/time of medication Yes   Compared to previous dose Yes   All medications are dated accurately Yes   Pregnancy Test Negative Not applicable   Parameters Met Yes   BSA/Weight-Height Verified Yes   Dose Calculations Verified Yes

## 2023-12-07 LAB
ALBUMIN SERPL BCP-MCNC: 4.1 G/DL (ref 3.4–5)
ALP SERPL-CCNC: 56 U/L (ref 33–136)
ALT SERPL W P-5'-P-CCNC: 19 U/L (ref 7–45)
ANION GAP SERPL CALC-SCNC: 12 MMOL/L (ref 10–20)
AST SERPL W P-5'-P-CCNC: 23 U/L (ref 9–39)
BILIRUB SERPL-MCNC: 0.3 MG/DL (ref 0–1.2)
BUN SERPL-MCNC: 19 MG/DL (ref 6–23)
CALCIUM SERPL-MCNC: 9.6 MG/DL (ref 8.6–10.6)
CHLORIDE SERPL-SCNC: 104 MMOL/L (ref 98–107)
CO2 SERPL-SCNC: 28 MMOL/L (ref 21–32)
CREAT SERPL-MCNC: 1.16 MG/DL (ref 0.5–1.05)
GFR SERPL CREATININE-BSD FRML MDRD: 49 ML/MIN/1.73M*2
GLUCOSE SERPL-MCNC: 92 MG/DL (ref 74–99)
POTASSIUM SERPL-SCNC: 4.1 MMOL/L (ref 3.5–5.3)
PROT SERPL-MCNC: 6.4 G/DL (ref 6.4–8.2)
SODIUM SERPL-SCNC: 140 MMOL/L (ref 136–145)

## 2023-12-10 NOTE — PROGRESS NOTES
Linda C Hegarty female   1945 78 y.o.   [id number]      Chief Complaint    Follow-up          HPI  Linda C Hegarty is a 78 y.o. woman diagnosed  with left breast Paget's and DCIS. 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 She had positive margins and required reexcision, negative for DCIS or microinvasion.  she was diagnosed with recurrence found palpable left neck lymph node. Metastatic workup showed metastatic disease left axilla, retrocrural clavicular suspicious early osteitis met and lung nodules. She had left axillary excision and left chest wall excision 2014 with metastatic ER/ID negative HER-2/duarte 3+ amplified. 2014 bronchoscopy showed malignant cells morphologically compatible with breast cancer, ER/ID negative HER-2 positive. Patient completed 6 cycles trastuzumab, pertuzumab and Docetaxel. Currently on Trastuzumab/Pertuzumab with Marybel Teston. Her cancer is stable.   Diagnosis Date: 14-Mar-2012, Stage IA-- now Stage 4    BREAST IMAGIN2022 right diagnostic mammogram, BI-RADS Category 2. 2023 bone scan, no evidence of bone mets. 2023 CT chest/abd/pelvis, no disease progression    FEMALE HISTORY: Menarche age 15, , first birth age 32 menopause age 44, HRT, heterogeneously dense tissue    FAMILY CANCER HISTORY:  Sister: breast cancer age 52, BRCA negative  Maternal aunt: Colon cancer (60s)  Paternal grandmother: Lymphoma  Paternal grandmother: liver cancer       REVIEW OF SYSTEMS    Constitutional:  Negative for appetite change, fatigue, fever and unexpected weight change.   HENT:  Negative for ear pain, hearing loss, nosebleeds, sore throat and trouble swallowing.    Eyes:  Negative for discharge, itching and visual disturbance.   Respiratory:  Negative for cough, chest tightness and shortness of breath.    Cardiovascular:  Negative for chest pain, palpitations and leg swelling.   Breast: as  indicated in HPI  Gastrointestinal:  Negative for abdominal pain, constipation, diarrhea and nausea.   Endocrine: Negative for cold intolerance and heat intolerance.   Genitourinary:  Negative for dysuria, frequency, hematuria, pelvic pain and vaginal bleeding.   Musculoskeletal:  Negative for arthralgias, back pain, gait problem, joint swelling and myalgias.   Skin:  Negative for color change and rash.   Allergic/Immunologic: Negative for environmental allergies and food allergies.   Neurological:  Negative for dizziness, tremors, speech difficulty, weakness, numbness and headaches.   Hematological:  Does not bruise/bleed easily.   Psychiatric/Behavioral:  Negative for agitation, dysphoric mood and sleep disturbance. The patient is not nervous/anxious.         MEDICATIONS  Current Outpatient Medications   Medication Instructions    amitriptyline (ELAVIL) 25 mg, oral, Nightly    amoxicillin (AMOXIL) 500 mg, oral, 3 times daily    ascorbic acid (VITAMIN C) 1,000 mg, oral    aspirin 81 mg chewable tablet 1 tablet, oral, Daily    atorvastatin (LIPITOR) 40 mg, oral, Nightly    calcium citrate (Calcitrate) 200 mg (950 mg) tablet 1 tablet, oral, Daily    carvedilol (COREG) 3.125 mg, oral, 2 times daily with meals    cetirizine (ZYRTEC) 10 mg, oral, Daily    dexlansoprazole (Dexilant) 60 mg DR capsule TAKE ONE CAPSULE BY MOUTH 30 MINUTES BEFORE BREAKFAST DAILY    diphenhydrAMINE (BENADRYL) 25 mg, oral, Nightly    famotidine (PEPCID) 40 mg, oral, Daily    fexofenadine (ALLEGRA) 180 mg, oral, Daily RT    fluticasone (Flonase) 50 mcg/actuation nasal spray 2 sprays, nasal, Daily PRN    hydrOXYzine HCL (Atarax) 25 mg tablet 1 Tablet    Lactobacillus acidophilus (PROBIOTIC ORAL) oral, Chewable tablet    lansoprazole (PREVACID) 30 mg, oral, 2 times daily    nitrofurantoin, macrocrystal-monohydrate, (Macrobid) 100 mg capsule TAKE 1 CAPSULE WITH FOOD BY MOUTH EVERY 12 HOURS FOR 5 DAYS    ondansetron (ZOFRAN) 4 mg, oral, Every 8  hours PRN    ondansetron (ZOFRAN) 8 mg, oral, Every 6 hours    oxyCODONE-acetaminophen (Percocet) 5-325 mg tablet TAKE TWO TABLETS BY MOUTH EVERY SIX HOURS AS NEEDED FOR PAIN    PERTUZUMAB IV 1 Dose, intravenous    predniSONE (Deltasone) 20 mg tablet TAKE 2 TABLETS BY MOUTH DAILY FOR 3 DAYS  THEN 1 TABLET DAILY FOR 3 DAYS  THEN 1/2 TABLET DAILY FOR 2 DAYS  THEN STOP.    Premarin vaginal cream INSERT 0.5 GRAMS VAGINALLY ONCE DAILY FOR 2 WEEKS  THEN DECREASE TO 0.5 GRAMS ONCE A  WEEK    promethazine (Phenergan) 25 mg tablet TAKE ONE TABLET BY MOUTH EVERY 4 HOURS AS NEEDED FOR NAUSEA AND VOMITING    ramipril (ALTACE) 2.5 mg, oral, Daily    rizatriptan MLT (Maxalt-MLT) 10 mg disintegrating tablet DISSOLVE ONE TABLET IN MOUTH ONCE DAILY AS NEEDED    sulfamethoxazole-trimethoprim (Bactrim) 400-80 mg tablet 0.5 tablets, oral, Daily    TRASTUZUMAB IV 2 mg/kg, intravenous    triamcinolone (Kenalog) 0.1 % cream 1 Application    triamcinolone acetonide 0.025 % lotion 1 Application, Topical, 2 times daily        ALLERGIES  Allergies   Allergen Reactions    Codeine Unknown    Meperidine Unknown    Morphine Unknown        SOCIAL HISTORY    Family History   Problem Relation Name Age of Onset    Diabetes Mother      Heart failure Father      Breast cancer Sister      Diabetes Other aunt          Social History     Tobacco Use    Smoking status: Former     Types: Cigarettes     Passive exposure: Past    Smokeless tobacco: Never   Substance Use Topics    Alcohol use: Never        VITALS  Vitals:    12/11/23 1224   BP: 156/88   Pulse: 76   Temp: 36.7 °C (98.1 °F)        PHYSICAL EXAM  Patient is alert and oriented x3, with appropriate mood. The gait is steady and hand grasps are equal. Sclera clear. The left breast removed with healed mastectomy incision. The right breast tissue is soft without palpable abnormalities, discrete nodules or masses. The skin and nipple appear normal. There is no cervical, supraclavicular, or axillary  lymphadenopathy palpable. Heart rate and rhythm normal, S1 and S2 appreciated. The lungs are clear bilaterally. Abdomen is soft & non-tender.    Physical Exam  Chest:              IMAGING  Right diagnostic mammogram, BI-RADS Category 2.    Time was spent viewing digital images of the radiology testing with the patient. I explained the results in depth, along with suggested explanation for follow up recommendations based on the testing results.          ORDERS  Orders Placed This Encounter   Procedures    BI mammo right screening tomosynthesis     Standing Status:   Future     Standing Expiration Date:   2/10/2025     Order Specific Question:   Reason for exam:     Answer:   clinic screen, hx left mastectomy     Order Specific Question:   Radiologist to Determine Optimal Study     Answer:   Yes     Order Specific Question:   Release result to [employer] [employer]     Answer:   Immediate [1]     Order Specific Question:   Is this exam part of a Research Study? If Yes, link this order to the research study     Answer:   No           ASSESSMENT/PLAN  1. Encounter for follow-up surveillance of breast cancer  BI mammo right screening tomosynthesis    Clinic Appointment Request           Follow up in about 1 year (around 12/11/2024), or with clinic screening mammogram.  Clinical examination and imaging is normal. Please return one year for mammogram and office visit or sooner if you having any problems or concerns.     You can see your health information, review clinical summaries from office visits & test results online when you follow your health with MY  Chart, a personal health record. To sign up go to [email or web address]. If you need assistance with signing up or trouble getting into your account call [facility] Patient Line 24/7 at [phone].     My office phone number is [phone] if you need to get in touch with me or have additional questions or problems. Thank you for choosing [facility] and trusting me  as your healthcare provider. I look forward to seeing you again at your next office visit. I am honored to be a provider on your health care team and I remain dedicated to helping you achieve your health goals.        [facility]

## 2023-12-11 ENCOUNTER — OFFICE VISIT (OUTPATIENT)
Dept: SURGICAL ONCOLOGY | Facility: CLINIC | Age: 78
End: 2023-12-11
Payer: MEDICARE

## 2023-12-11 ENCOUNTER — ANCILLARY PROCEDURE (OUTPATIENT)
Dept: RADIOLOGY | Facility: CLINIC | Age: 78
End: 2023-12-11
Payer: MEDICARE

## 2023-12-11 VITALS — BODY MASS INDEX: 23.56 KG/M2 | HEIGHT: 61 IN | WEIGHT: 124.78 LBS

## 2023-12-11 VITALS
DIASTOLIC BLOOD PRESSURE: 88 MMHG | HEART RATE: 76 BPM | WEIGHT: 124.23 LBS | SYSTOLIC BLOOD PRESSURE: 156 MMHG | TEMPERATURE: 98.1 F | BODY MASS INDEX: 23.52 KG/M2

## 2023-12-11 DIAGNOSIS — Z08 ENCOUNTER FOR FOLLOW-UP EXAMINATION AFTER COMPLETED TREATMENT FOR MALIGNANT NEOPLASM: ICD-10-CM

## 2023-12-11 DIAGNOSIS — Z85.3 PERSONAL HISTORY OF MALIGNANT NEOPLASM OF BREAST: ICD-10-CM

## 2023-12-11 DIAGNOSIS — Z08 ENCOUNTER FOR FOLLOW-UP SURVEILLANCE OF BREAST CANCER: Primary | ICD-10-CM

## 2023-12-11 DIAGNOSIS — Z85.3 ENCOUNTER FOR FOLLOW-UP SURVEILLANCE OF BREAST CANCER: Primary | ICD-10-CM

## 2023-12-11 PROCEDURE — 1160F RVW MEDS BY RX/DR IN RCRD: CPT | Performed by: NURSE PRACTITIONER

## 2023-12-11 PROCEDURE — 99214 OFFICE O/P EST MOD 30 MIN: CPT | Performed by: NURSE PRACTITIONER

## 2023-12-11 PROCEDURE — 1036F TOBACCO NON-USER: CPT | Performed by: NURSE PRACTITIONER

## 2023-12-11 PROCEDURE — 3077F SYST BP >= 140 MM HG: CPT | Performed by: NURSE PRACTITIONER

## 2023-12-11 PROCEDURE — 3079F DIAST BP 80-89 MM HG: CPT | Performed by: NURSE PRACTITIONER

## 2023-12-11 PROCEDURE — 1126F AMNT PAIN NOTED NONE PRSNT: CPT | Performed by: NURSE PRACTITIONER

## 2023-12-11 PROCEDURE — 1159F MED LIST DOCD IN RCRD: CPT | Performed by: NURSE PRACTITIONER

## 2023-12-11 PROCEDURE — 77065 DX MAMMO INCL CAD UNI: CPT | Mod: RIGHT SIDE | Performed by: RADIOLOGY

## 2023-12-11 PROCEDURE — 77065 DX MAMMO INCL CAD UNI: CPT | Mod: RT

## 2023-12-11 PROCEDURE — G0279 TOMOSYNTHESIS, MAMMO: HCPCS | Mod: RIGHT SIDE | Performed by: RADIOLOGY

## 2023-12-11 ASSESSMENT — PAIN SCALES - GENERAL: PAINLEVEL: 0-NO PAIN

## 2023-12-14 ENCOUNTER — APPOINTMENT (OUTPATIENT)
Dept: SURGICAL ONCOLOGY | Facility: CLINIC | Age: 78
End: 2023-12-14
Payer: MEDICARE

## 2023-12-14 ENCOUNTER — APPOINTMENT (OUTPATIENT)
Dept: RADIOLOGY | Facility: CLINIC | Age: 78
End: 2023-12-14
Payer: MEDICARE

## 2023-12-27 ENCOUNTER — ANCILLARY PROCEDURE (OUTPATIENT)
Dept: RADIOLOGY | Facility: CLINIC | Age: 78
End: 2023-12-27
Payer: MEDICARE

## 2023-12-27 ENCOUNTER — INFUSION (OUTPATIENT)
Dept: HEMATOLOGY/ONCOLOGY | Facility: CLINIC | Age: 78
End: 2023-12-27
Payer: MEDICARE

## 2023-12-27 VITALS
BODY MASS INDEX: 23.47 KG/M2 | DIASTOLIC BLOOD PRESSURE: 84 MMHG | SYSTOLIC BLOOD PRESSURE: 124 MMHG | OXYGEN SATURATION: 95 % | WEIGHT: 124.01 LBS | TEMPERATURE: 98.8 F | HEART RATE: 59 BPM | RESPIRATION RATE: 16 BRPM

## 2023-12-27 DIAGNOSIS — C50.912 MALIGNANT NEOPLASM OF LEFT BREAST IN FEMALE, ESTROGEN RECEPTOR NEGATIVE, UNSPECIFIED SITE OF BREAST (MULTI): ICD-10-CM

## 2023-12-27 DIAGNOSIS — C50.919 BREAST CANCER METASTASIZED TO MULTIPLE SITES, UNSPECIFIED LATERALITY (MULTI): Primary | ICD-10-CM

## 2023-12-27 DIAGNOSIS — Z17.1 MALIGNANT NEOPLASM OF LEFT BREAST IN FEMALE, ESTROGEN RECEPTOR NEGATIVE, UNSPECIFIED SITE OF BREAST (MULTI): ICD-10-CM

## 2023-12-27 PROCEDURE — 74177 CT ABD & PELVIS W/CONTRAST: CPT | Performed by: RADIOLOGY

## 2023-12-27 PROCEDURE — 2550000001 HC RX 255 CONTRASTS: Performed by: STUDENT IN AN ORGANIZED HEALTH CARE EDUCATION/TRAINING PROGRAM

## 2023-12-27 PROCEDURE — 96401 CHEMO ANTI-NEOPL SQ/IM: CPT

## 2023-12-27 PROCEDURE — 71260 CT THORAX DX C+: CPT | Performed by: RADIOLOGY

## 2023-12-27 PROCEDURE — 74177 CT ABD & PELVIS W/CONTRAST: CPT

## 2023-12-27 PROCEDURE — 2500000004 HC RX 250 GENERAL PHARMACY W/ HCPCS (ALT 636 FOR OP/ED): Mod: JZ,TB | Performed by: STUDENT IN AN ORGANIZED HEALTH CARE EDUCATION/TRAINING PROGRAM

## 2023-12-27 RX ORDER — DIPHENHYDRAMINE HYDROCHLORIDE 50 MG/ML
50 INJECTION INTRAMUSCULAR; INTRAVENOUS AS NEEDED
Status: DISCONTINUED | OUTPATIENT
Start: 2023-12-27 | End: 2023-12-27 | Stop reason: HOSPADM

## 2023-12-27 RX ORDER — PROCHLORPERAZINE EDISYLATE 5 MG/ML
10 INJECTION INTRAMUSCULAR; INTRAVENOUS EVERY 6 HOURS PRN
Status: DISCONTINUED | OUTPATIENT
Start: 2023-12-27 | End: 2023-12-27 | Stop reason: HOSPADM

## 2023-12-27 RX ORDER — FAMOTIDINE 10 MG/ML
20 INJECTION INTRAVENOUS ONCE AS NEEDED
Status: DISCONTINUED | OUTPATIENT
Start: 2023-12-27 | End: 2023-12-27 | Stop reason: HOSPADM

## 2023-12-27 RX ORDER — ALBUTEROL SULFATE 0.83 MG/ML
3 SOLUTION RESPIRATORY (INHALATION) AS NEEDED
Status: DISCONTINUED | OUTPATIENT
Start: 2023-12-27 | End: 2023-12-27 | Stop reason: HOSPADM

## 2023-12-27 RX ORDER — PROCHLORPERAZINE MALEATE 10 MG
10 TABLET ORAL EVERY 6 HOURS PRN
Status: DISCONTINUED | OUTPATIENT
Start: 2023-12-27 | End: 2023-12-27 | Stop reason: HOSPADM

## 2023-12-27 RX ORDER — EPINEPHRINE 0.3 MG/.3ML
0.3 INJECTION SUBCUTANEOUS EVERY 5 MIN PRN
Status: DISCONTINUED | OUTPATIENT
Start: 2023-12-27 | End: 2023-12-27 | Stop reason: HOSPADM

## 2023-12-27 RX ADMIN — PERTUZUMAB, TRASTUZUMAB, AND HYALURONIDASE-ZZXF 10 ML: 600; 600; 2000 INJECTION, SOLUTION SUBCUTANEOUS at 11:28

## 2023-12-27 RX ADMIN — IOHEXOL 70 ML: 350 INJECTION, SOLUTION INTRAVENOUS at 13:26

## 2023-12-27 ASSESSMENT — PAIN SCALES - GENERAL: PAINLEVEL: 0-NO PAIN

## 2023-12-27 NOTE — SIGNIFICANT EVENT
12/27/23 1042   Prechemo Checklist   Has the patient been in the hospital, ED, or urgent care since last date of service No   Chemo/Immuno Consent Signed Yes   Protocol/Indications Verified Yes   Confirmed to previous date/time of medication Yes   Compared to previous dose Yes   All medications are dated accurately Yes   Pregnancy Test Negative Not applicable   Parameters Met Yes   BSA/Weight-Height Verified Yes   Dose Calculations Verified Yes

## 2023-12-27 NOTE — PROGRESS NOTES
Treatment completed. Patient tolerated injectio well. Remained asymptomatic throughout. Discharged home ambulatory offering no complaints.

## 2024-01-08 ENCOUNTER — APPOINTMENT (OUTPATIENT)
Dept: HEMATOLOGY/ONCOLOGY | Facility: CLINIC | Age: 79
End: 2024-01-08
Payer: MEDICARE

## 2024-01-12 DIAGNOSIS — Z17.1 MALIGNANT NEOPLASM OF LEFT BREAST IN FEMALE, ESTROGEN RECEPTOR NEGATIVE, UNSPECIFIED SITE OF BREAST (MULTI): Primary | ICD-10-CM

## 2024-01-12 DIAGNOSIS — C50.912 MALIGNANT NEOPLASM OF LEFT BREAST IN FEMALE, ESTROGEN RECEPTOR NEGATIVE, UNSPECIFIED SITE OF BREAST (MULTI): Primary | ICD-10-CM

## 2024-01-12 RX ORDER — ALBUTEROL SULFATE 0.83 MG/ML
3 SOLUTION RESPIRATORY (INHALATION) AS NEEDED
Status: CANCELLED | OUTPATIENT
Start: 2024-01-18

## 2024-01-12 RX ORDER — EPINEPHRINE 0.3 MG/.3ML
0.3 INJECTION SUBCUTANEOUS EVERY 5 MIN PRN
Status: CANCELLED | OUTPATIENT
Start: 2024-01-18

## 2024-01-12 RX ORDER — DIPHENHYDRAMINE HYDROCHLORIDE 50 MG/ML
50 INJECTION INTRAMUSCULAR; INTRAVENOUS AS NEEDED
Status: CANCELLED | OUTPATIENT
Start: 2024-01-18

## 2024-01-12 RX ORDER — PROCHLORPERAZINE MALEATE 10 MG
10 TABLET ORAL EVERY 6 HOURS PRN
Status: CANCELLED | OUTPATIENT
Start: 2024-01-18

## 2024-01-12 RX ORDER — PROCHLORPERAZINE EDISYLATE 5 MG/ML
10 INJECTION INTRAMUSCULAR; INTRAVENOUS EVERY 6 HOURS PRN
Status: CANCELLED | OUTPATIENT
Start: 2024-01-18

## 2024-01-12 RX ORDER — FAMOTIDINE 10 MG/ML
20 INJECTION INTRAVENOUS ONCE AS NEEDED
Status: CANCELLED | OUTPATIENT
Start: 2024-01-18

## 2024-01-17 ENCOUNTER — APPOINTMENT (OUTPATIENT)
Dept: CARDIOLOGY | Facility: CLINIC | Age: 79
End: 2024-01-17
Payer: MEDICARE

## 2024-01-18 ENCOUNTER — INFUSION (OUTPATIENT)
Dept: HEMATOLOGY/ONCOLOGY | Facility: CLINIC | Age: 79
End: 2024-01-18
Payer: MEDICARE

## 2024-01-18 VITALS
TEMPERATURE: 96.8 F | DIASTOLIC BLOOD PRESSURE: 87 MMHG | WEIGHT: 126.98 LBS | HEART RATE: 80 BPM | BODY MASS INDEX: 24.04 KG/M2 | SYSTOLIC BLOOD PRESSURE: 141 MMHG | OXYGEN SATURATION: 98 % | RESPIRATION RATE: 16 BRPM

## 2024-01-18 DIAGNOSIS — C77.0 METASTASIS TO SUPRACLAVICULAR LYMPH NODE (MULTI): ICD-10-CM

## 2024-01-18 DIAGNOSIS — Z17.1 MALIGNANT NEOPLASM OF LEFT BREAST IN FEMALE, ESTROGEN RECEPTOR NEGATIVE, UNSPECIFIED SITE OF BREAST (MULTI): ICD-10-CM

## 2024-01-18 DIAGNOSIS — C50.912 MALIGNANT NEOPLASM OF LEFT BREAST IN FEMALE, ESTROGEN RECEPTOR NEGATIVE, UNSPECIFIED SITE OF BREAST (MULTI): ICD-10-CM

## 2024-01-18 PROCEDURE — 96372 THER/PROPH/DIAG INJ SC/IM: CPT | Performed by: STUDENT IN AN ORGANIZED HEALTH CARE EDUCATION/TRAINING PROGRAM

## 2024-01-18 PROCEDURE — 2500000004 HC RX 250 GENERAL PHARMACY W/ HCPCS (ALT 636 FOR OP/ED): Performed by: STUDENT IN AN ORGANIZED HEALTH CARE EDUCATION/TRAINING PROGRAM

## 2024-01-18 PROCEDURE — 96401 CHEMO ANTI-NEOPL SQ/IM: CPT

## 2024-01-18 RX ORDER — EPINEPHRINE 0.3 MG/.3ML
0.3 INJECTION SUBCUTANEOUS EVERY 5 MIN PRN
Status: DISCONTINUED | OUTPATIENT
Start: 2024-01-18 | End: 2024-01-18 | Stop reason: HOSPADM

## 2024-01-18 RX ORDER — ALBUTEROL SULFATE 0.83 MG/ML
3 SOLUTION RESPIRATORY (INHALATION) AS NEEDED
Status: DISCONTINUED | OUTPATIENT
Start: 2024-01-18 | End: 2024-01-18 | Stop reason: HOSPADM

## 2024-01-18 RX ORDER — PROCHLORPERAZINE MALEATE 10 MG
10 TABLET ORAL EVERY 6 HOURS PRN
Status: DISCONTINUED | OUTPATIENT
Start: 2024-01-18 | End: 2024-01-18 | Stop reason: HOSPADM

## 2024-01-18 RX ORDER — FAMOTIDINE 10 MG/ML
20 INJECTION INTRAVENOUS ONCE AS NEEDED
Status: DISCONTINUED | OUTPATIENT
Start: 2024-01-18 | End: 2024-01-18 | Stop reason: HOSPADM

## 2024-01-18 RX ORDER — DIPHENHYDRAMINE HYDROCHLORIDE 50 MG/ML
50 INJECTION INTRAMUSCULAR; INTRAVENOUS AS NEEDED
Status: DISCONTINUED | OUTPATIENT
Start: 2024-01-18 | End: 2024-01-18 | Stop reason: HOSPADM

## 2024-01-18 RX ORDER — PROCHLORPERAZINE EDISYLATE 5 MG/ML
10 INJECTION INTRAMUSCULAR; INTRAVENOUS EVERY 6 HOURS PRN
Status: DISCONTINUED | OUTPATIENT
Start: 2024-01-18 | End: 2024-01-18 | Stop reason: HOSPADM

## 2024-01-18 RX ADMIN — PERTUZUMAB, TRASTUZUMAB, AND HYALURONIDASE-ZZXF 10 ML: 600; 600; 2000 INJECTION, SOLUTION SUBCUTANEOUS at 10:28

## 2024-01-18 ASSESSMENT — PAIN SCALES - GENERAL: PAINLEVEL: 0-NO PAIN

## 2024-01-19 ASSESSMENT — ENCOUNTER SYMPTOMS
ABDOMINAL PAIN: 0
VOMITING: 0
DIZZINESS: 0
CONSTIPATION: 0
DIARRHEA: 0
CHILLS: 0
HEMATURIA: 0
CONFUSION: 0
SHORTNESS OF BREATH: 0
UNEXPECTED WEIGHT CHANGE: 0
COUGH: 0
NUMBNESS: 0
FATIGUE: 0
APPETITE CHANGE: 0
ADENOPATHY: 0
FEVER: 0
LEG SWELLING: 0
DYSURIA: 0
ARTHRALGIAS: 0
NAUSEA: 0
HEADACHES: 0
HOT FLASHES: 0
BACK PAIN: 0
DIFFICULTY URINATING: 0

## 2024-01-19 NOTE — PROGRESS NOTES
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 The final pathology showed scattered ducts in the superolateral segment of the breast involved by high grade ductal carcinoma in situ with multifocal areas of microinvasive carcinoma. DCIS and microinvasive tumor approaches to within 1 mm of the superior lateral margin. The DCIS and microinvasive were ER and MI negative. She also had 0 of 3 lymph nodes which were negative for malignancy. She then had a re-excision which was negative for residual DCIS or microinvasive disease. She had no adjuvant therapy.    She did well until 2014 when she self palpated L neck nodes. A CT neck with contrast was performed on 7/30/14 which showed enlarged and abnormally enhancing left posterior cervical space lymph node near the left subclavicular space. Smaller left posterior cervical space lymph nodes are also noted. A CT chest with contrast was also performed on 7/30/14 which showed new left axillary and retroclavicular adenopathy suspicious for metastatic disease, a new subtle 5 mm focus  in the T12 vertebral body suspicious for early osseous mets and a new 2 to 3 mm lung nodule in the right lower lobe. She had a PET/CT on 8/7/14 which showed intensely hypermetabolic lower cervical and thoracic adenopathy suspicious for recurrent/metastatic breast cancer. Also an intensely hypermetabolic subcarinal lymph node was seen with a max SUV of 8 suspicious for metastasis. She was seen by Dr. Fernando Paris on 8/11/14 and he performed an excisional biopsy of the left axillary lymph node as well as a left chest wall mass on 8/14.     The left axillary lymph node showed metastatic carcinoma with extracapsular extension which was ER <1%, MI 0% and HER2 3+ by IHC.    A bronchoscopy was performed on 9/2/14 which  showed malignant cells derived from adenocarcinoma, morphologically compatible with the patient's known history of breast cancer which were ER/AL negative by IHC and HER2 positive (3+ by IHC)     Patient started first line systemic chemotherapy treatment with trastuzumab, pertuzumab, and docetaxel on 2014 and completed 6 cycles with a CR by imaging.    Herceptin/Perjeta monotherapy started 2015    GYN History/Pertinent Family history::  Sister diagnosed with breast cancer at age 52 who underwent genetic testing and was negative for BRCA1/BRCA2 per patient.     Maternal aunt with colon cancer diagnosed in her 60s.     Maternal grandmother diagnosed with lymphoma and paternal grandmother diagnosed with liver cancer.    Gyn History: 1st period at age 15. 1st pregnancy at age 32. . Last menstrual period in        Subjective: Interval History    Tingling ear and neck for several months. Saw PCP. Head MRI was unremarkable. PCP feels it is a pinched nerve and doing PT. It is positional, takes place when she turns her head to the right.     Denies weight loss, changes in the breast and/or chest wall, new aches or pains, changes in appetite or energy level. No current concerns at this time.     Tolerating Phesgo well.     ROS:     Review of Systems   Constitutional:  Negative for appetite change, chills, fatigue, fever and unexpected weight change.   HENT:   Negative for mouth sores.    Respiratory:  Negative for cough and shortness of breath.    Cardiovascular:  Negative for chest pain and leg swelling.   Gastrointestinal:  Negative for abdominal pain, constipation, diarrhea, nausea and vomiting.   Endocrine: Negative for hot flashes.   Genitourinary:  Negative for difficulty urinating, dysuria and hematuria.    Musculoskeletal:  Negative for arthralgias and back pain.   Skin:  Negative for rash.   Neurological:  Negative for dizziness, headaches and numbness.   Hematological:  Negative for adenopathy.    Psychiatric/Behavioral:  Negative for confusion.         Physical Examination:    /79   Pulse 76   Temp 36.1 °C (97 °F)   Resp 14   Wt 57.6 kg (127 lb 1.5 oz)   SpO2 98%   BMI 24.06 kg/m²     Physical Exam  Vitals and nursing note reviewed.   Constitutional:       General: She is not in acute distress.     Appearance: Normal appearance. She is not toxic-appearing.   HENT:      Head: Normocephalic and atraumatic.      Mouth/Throat:      Pharynx: Oropharynx is clear.   Eyes:      Extraocular Movements: Extraocular movements intact.      Conjunctiva/sclera: Conjunctivae normal.   Cardiovascular:      Rate and Rhythm: Normal rate and regular rhythm.   Pulmonary:      Effort: Pulmonary effort is normal. No respiratory distress.   Abdominal:      General: Abdomen is flat. Bowel sounds are normal.      Palpations: Abdomen is soft.   Musculoskeletal:         General: No swelling. Normal range of motion.      Cervical back: Normal range of motion.   Skin:     General: Skin is warm and dry.   Neurological:      General: No focal deficit present.      Mental Status: She is alert.   Psychiatric:         Mood and Affect: Mood normal.       Breast Examination:    L mastectomy, chest wall unremarkable.  R breast without masses, skin or nipple changes.         ECOG Performance Status:     [x] 0 Fully active, able to carry on all pre-disease performance without restriction  [] 1 Restricted in physically strenuous activity but ambulatory and able to carry out work of a light or sedentary nature, e.g., light house work, office work  [] 2 Ambulatory and capable of all selfcare but unable to carry out any work activities; up and about more than 50% of waking hours  [] 3 Capable of only limited selfcare; confined to bed or chair more than 50% of waking hours  [] 4 Completely disabled; cannot carry on any selfcare; totally confined to bed or chair  [] 5 Dead     Labs:  None since last visit.     Imagin23: CT CAP:    No evidence of metastatic disease in the chest, abdomen, or pelvis.      Mild diffuse colonic wall thickening may be due to lack of distention  or colitis. Correlate with physical exam findings.      Mild ascending thoracic aortic aneurysm. Atherosclerosis.      Stable subcentimeter sclerotic lesion in L5 vertebral body.    12/11/23: Mammogram:  No mammographic evidence of malignancy.     Pathology:  None since last visit.     Assessment:     Metastatic ER/DE negative, HER2 positive breast cancer diagnosed in 2014. S/p THPx6 and has been on maintenance herceptin/perjeta (phesgo) since 2015.     Tolerating phesgo; re-staging scans stable. No concerns at this time.     Plan:    Surgical Plan: S/p L mastectomy and SLNBx in 2012  Additional biopsy: Not indicated  Radiation Plan: Not indicated  Additional staging scans/DEXA/echo: Recent staging scans reviewed. She will require echocardiogram every 3 months while receiving HER2 directed therapy managed by onco-cardiology.  Additional Path info (i.e Ki67, PDL1): N/A  Gene assays: Not indicated    Systemic treatment plan: Remains on maintenance herceptin/perjeta since 2015, now on phesgo     Intent: Palliative/disease control   Clinical trial: N/A   Endocrine therapy: Not indicated   HER2 treatment: Remains on maintenance herceptin/perjeta since 2015, now on phesgo   Targeted agents: Not indicated   Chemotherapy: Received Docetaxel/Herceptin/Perjeta X6 in 2014   BMA: N/A    Access: Removed  Supportive meds: N/A  Genetic testing: Information given to patient. She has not had genetic testing, sister was negative. Referral in place.   Fertility preservation: Not indicated  Other active problems/orders:     Osteopenia: 5/2021 DEXA. We discussed general recommendations for bone loss prevention which include 4654-2365 mg of calcium intake per day for adults >50yrs, and no history of renal calculi; 800-1000 IU of vitamin D3 per day for adults >50yrs, and no history of renal  calculi. Weight bearing exercise. Discontinue smoking. Avoid excessive use of caffeine, soft drinks, and alcoholic beverages. In addition, balance training and fall prevention programs can help reduce the risk of fractures. She was intermittenly receiving     Enlarged AAA: She follows with cardiology    Surveillance plan: Patient has been alternating NM bone scan and CT CAP every 3 months. Will order next NM bone scan    Follow-up: 3 months    Patient expressed understanding of the plan outlined above. She had ample time to ask questions. She understands she can contact us should she have additional questions or issues arise in the interim.

## 2024-01-22 ENCOUNTER — OFFICE VISIT (OUTPATIENT)
Dept: HEMATOLOGY/ONCOLOGY | Facility: CLINIC | Age: 79
End: 2024-01-22
Payer: MEDICARE

## 2024-01-22 VITALS
BODY MASS INDEX: 24.06 KG/M2 | TEMPERATURE: 97 F | DIASTOLIC BLOOD PRESSURE: 79 MMHG | RESPIRATION RATE: 14 BRPM | OXYGEN SATURATION: 98 % | WEIGHT: 127.09 LBS | HEART RATE: 76 BPM | SYSTOLIC BLOOD PRESSURE: 123 MMHG

## 2024-01-22 DIAGNOSIS — C77.0 METASTASIS TO SUPRACLAVICULAR LYMPH NODE (MULTI): ICD-10-CM

## 2024-01-22 DIAGNOSIS — C50.919 BREAST CANCER METASTASIZED TO MULTIPLE SITES, UNSPECIFIED LATERALITY (MULTI): ICD-10-CM

## 2024-01-22 PROCEDURE — 3078F DIAST BP <80 MM HG: CPT | Performed by: STUDENT IN AN ORGANIZED HEALTH CARE EDUCATION/TRAINING PROGRAM

## 2024-01-22 PROCEDURE — 1160F RVW MEDS BY RX/DR IN RCRD: CPT | Performed by: STUDENT IN AN ORGANIZED HEALTH CARE EDUCATION/TRAINING PROGRAM

## 2024-01-22 PROCEDURE — 99215 OFFICE O/P EST HI 40 MIN: CPT | Performed by: STUDENT IN AN ORGANIZED HEALTH CARE EDUCATION/TRAINING PROGRAM

## 2024-01-22 PROCEDURE — 1159F MED LIST DOCD IN RCRD: CPT | Performed by: STUDENT IN AN ORGANIZED HEALTH CARE EDUCATION/TRAINING PROGRAM

## 2024-01-22 PROCEDURE — 1126F AMNT PAIN NOTED NONE PRSNT: CPT | Performed by: STUDENT IN AN ORGANIZED HEALTH CARE EDUCATION/TRAINING PROGRAM

## 2024-01-22 PROCEDURE — 3074F SYST BP LT 130 MM HG: CPT | Performed by: STUDENT IN AN ORGANIZED HEALTH CARE EDUCATION/TRAINING PROGRAM

## 2024-01-22 PROCEDURE — 1036F TOBACCO NON-USER: CPT | Performed by: STUDENT IN AN ORGANIZED HEALTH CARE EDUCATION/TRAINING PROGRAM

## 2024-01-22 ASSESSMENT — LIFESTYLE VARIABLES
HOW OFTEN DO YOU HAVE A DRINK CONTAINING ALCOHOL: MONTHLY OR LESS
HOW MANY STANDARD DRINKS CONTAINING ALCOHOL DO YOU HAVE ON A TYPICAL DAY: 1 OR 2
HOW OFTEN DO YOU HAVE SIX OR MORE DRINKS ON ONE OCCASION: NEVER
SKIP TO QUESTIONS 9-10: 1
AUDIT-C TOTAL SCORE: 1

## 2024-01-22 ASSESSMENT — PAIN SCALES - GENERAL: PAINLEVEL: 0-NO PAIN

## 2024-01-22 ASSESSMENT — PATIENT HEALTH QUESTIONNAIRE - PHQ9
2. FEELING DOWN, DEPRESSED OR HOPELESS: NOT AT ALL
SUM OF ALL RESPONSES TO PHQ9 QUESTIONS 1 & 2: 0
1. LITTLE INTEREST OR PLEASURE IN DOING THINGS: NOT AT ALL

## 2024-02-01 DIAGNOSIS — Z17.1 MALIGNANT NEOPLASM OF LEFT BREAST IN FEMALE, ESTROGEN RECEPTOR NEGATIVE, UNSPECIFIED SITE OF BREAST (MULTI): Primary | ICD-10-CM

## 2024-02-01 DIAGNOSIS — C50.912 MALIGNANT NEOPLASM OF LEFT BREAST IN FEMALE, ESTROGEN RECEPTOR NEGATIVE, UNSPECIFIED SITE OF BREAST (MULTI): Primary | ICD-10-CM

## 2024-02-01 RX ORDER — DIPHENHYDRAMINE HYDROCHLORIDE 50 MG/ML
50 INJECTION INTRAMUSCULAR; INTRAVENOUS AS NEEDED
Status: CANCELLED | OUTPATIENT
Start: 2024-04-08

## 2024-02-01 RX ORDER — DIPHENHYDRAMINE HYDROCHLORIDE 50 MG/ML
50 INJECTION INTRAMUSCULAR; INTRAVENOUS AS NEEDED
Status: CANCELLED | OUTPATIENT
Start: 2024-03-21

## 2024-02-01 RX ORDER — ALBUTEROL SULFATE 0.83 MG/ML
3 SOLUTION RESPIRATORY (INHALATION) AS NEEDED
Status: CANCELLED | OUTPATIENT
Start: 2024-02-08

## 2024-02-01 RX ORDER — FAMOTIDINE 10 MG/ML
20 INJECTION INTRAVENOUS ONCE AS NEEDED
Status: CANCELLED | OUTPATIENT
Start: 2024-02-08

## 2024-02-01 RX ORDER — PROCHLORPERAZINE MALEATE 10 MG
10 TABLET ORAL EVERY 6 HOURS PRN
Status: CANCELLED | OUTPATIENT
Start: 2024-02-08

## 2024-02-01 RX ORDER — ALBUTEROL SULFATE 0.83 MG/ML
3 SOLUTION RESPIRATORY (INHALATION) AS NEEDED
Status: CANCELLED | OUTPATIENT
Start: 2024-02-29

## 2024-02-01 RX ORDER — PROCHLORPERAZINE MALEATE 10 MG
10 TABLET ORAL EVERY 6 HOURS PRN
Status: CANCELLED | OUTPATIENT
Start: 2024-03-21

## 2024-02-01 RX ORDER — PROCHLORPERAZINE EDISYLATE 5 MG/ML
10 INJECTION INTRAMUSCULAR; INTRAVENOUS EVERY 6 HOURS PRN
Status: CANCELLED | OUTPATIENT
Start: 2024-03-21

## 2024-02-01 RX ORDER — ALBUTEROL SULFATE 0.83 MG/ML
3 SOLUTION RESPIRATORY (INHALATION) AS NEEDED
Status: CANCELLED | OUTPATIENT
Start: 2024-04-08

## 2024-02-01 RX ORDER — ALBUTEROL SULFATE 0.83 MG/ML
3 SOLUTION RESPIRATORY (INHALATION) AS NEEDED
Status: CANCELLED | OUTPATIENT
Start: 2024-03-21

## 2024-02-01 RX ORDER — EPINEPHRINE 0.3 MG/.3ML
0.3 INJECTION SUBCUTANEOUS EVERY 5 MIN PRN
Status: CANCELLED | OUTPATIENT
Start: 2024-02-29

## 2024-02-01 RX ORDER — EPINEPHRINE 0.3 MG/.3ML
0.3 INJECTION SUBCUTANEOUS EVERY 5 MIN PRN
Status: CANCELLED | OUTPATIENT
Start: 2024-02-08

## 2024-02-01 RX ORDER — PROCHLORPERAZINE EDISYLATE 5 MG/ML
10 INJECTION INTRAMUSCULAR; INTRAVENOUS EVERY 6 HOURS PRN
Status: CANCELLED | OUTPATIENT
Start: 2024-04-08

## 2024-02-01 RX ORDER — EPINEPHRINE 0.3 MG/.3ML
0.3 INJECTION SUBCUTANEOUS EVERY 5 MIN PRN
Status: CANCELLED | OUTPATIENT
Start: 2024-04-08

## 2024-02-01 RX ORDER — DIPHENHYDRAMINE HYDROCHLORIDE 50 MG/ML
50 INJECTION INTRAMUSCULAR; INTRAVENOUS AS NEEDED
Status: CANCELLED | OUTPATIENT
Start: 2024-02-08

## 2024-02-01 RX ORDER — FAMOTIDINE 10 MG/ML
20 INJECTION INTRAVENOUS ONCE AS NEEDED
Status: CANCELLED | OUTPATIENT
Start: 2024-03-21

## 2024-02-01 RX ORDER — PROCHLORPERAZINE EDISYLATE 5 MG/ML
10 INJECTION INTRAMUSCULAR; INTRAVENOUS EVERY 6 HOURS PRN
Status: CANCELLED | OUTPATIENT
Start: 2024-02-08

## 2024-02-01 RX ORDER — FAMOTIDINE 10 MG/ML
20 INJECTION INTRAVENOUS ONCE AS NEEDED
Status: CANCELLED | OUTPATIENT
Start: 2024-02-29

## 2024-02-01 RX ORDER — EPINEPHRINE 0.3 MG/.3ML
0.3 INJECTION SUBCUTANEOUS EVERY 5 MIN PRN
Status: CANCELLED | OUTPATIENT
Start: 2024-03-21

## 2024-02-01 RX ORDER — PROCHLORPERAZINE MALEATE 10 MG
10 TABLET ORAL EVERY 6 HOURS PRN
Status: CANCELLED | OUTPATIENT
Start: 2024-04-08

## 2024-02-01 RX ORDER — DIPHENHYDRAMINE HYDROCHLORIDE 50 MG/ML
50 INJECTION INTRAMUSCULAR; INTRAVENOUS AS NEEDED
Status: CANCELLED | OUTPATIENT
Start: 2024-02-29

## 2024-02-01 RX ORDER — PROCHLORPERAZINE EDISYLATE 5 MG/ML
10 INJECTION INTRAMUSCULAR; INTRAVENOUS EVERY 6 HOURS PRN
Status: CANCELLED | OUTPATIENT
Start: 2024-02-29

## 2024-02-01 RX ORDER — PROCHLORPERAZINE MALEATE 10 MG
10 TABLET ORAL EVERY 6 HOURS PRN
Status: CANCELLED | OUTPATIENT
Start: 2024-02-29

## 2024-02-01 RX ORDER — FAMOTIDINE 10 MG/ML
20 INJECTION INTRAVENOUS ONCE AS NEEDED
Status: CANCELLED | OUTPATIENT
Start: 2024-04-08

## 2024-02-07 ENCOUNTER — INFUSION (OUTPATIENT)
Dept: HEMATOLOGY/ONCOLOGY | Facility: CLINIC | Age: 79
End: 2024-02-07
Payer: MEDICARE

## 2024-02-07 VITALS
RESPIRATION RATE: 12 BRPM | WEIGHT: 127.21 LBS | HEART RATE: 92 BPM | OXYGEN SATURATION: 98 % | TEMPERATURE: 97.3 F | BODY MASS INDEX: 24.08 KG/M2 | DIASTOLIC BLOOD PRESSURE: 81 MMHG | SYSTOLIC BLOOD PRESSURE: 122 MMHG

## 2024-02-07 DIAGNOSIS — C50.912 MALIGNANT NEOPLASM OF LEFT BREAST IN FEMALE, ESTROGEN RECEPTOR NEGATIVE, UNSPECIFIED SITE OF BREAST (MULTI): ICD-10-CM

## 2024-02-07 DIAGNOSIS — C50.919 BREAST CANCER METASTASIZED TO MULTIPLE SITES, UNSPECIFIED LATERALITY (MULTI): ICD-10-CM

## 2024-02-07 DIAGNOSIS — Z17.1 MALIGNANT NEOPLASM OF LEFT BREAST IN FEMALE, ESTROGEN RECEPTOR NEGATIVE, UNSPECIFIED SITE OF BREAST (MULTI): ICD-10-CM

## 2024-02-07 PROBLEM — Z85.3 PERSONAL HISTORY OF BREAST CANCER: Status: ACTIVE | Noted: 2024-02-07

## 2024-02-07 PROBLEM — Z51.81 ENCOUNTER FOR THERAPEUTIC DRUG LEVEL MONITORING: Status: ACTIVE | Noted: 2023-03-02

## 2024-02-07 PROBLEM — Z79.899 OTHER LONG TERM (CURRENT) DRUG THERAPY: Status: ACTIVE | Noted: 2023-03-02

## 2024-02-07 PROBLEM — R13.12 DYSPHAGIA, OROPHARYNGEAL PHASE: Status: ACTIVE | Noted: 2023-04-28

## 2024-02-07 PROBLEM — I51.7 CARDIOMEGALY: Status: ACTIVE | Noted: 2023-01-06

## 2024-02-07 PROBLEM — Z51.81 ENCOUNTER FOR THERAPEUTIC DRUG LEVEL MONITORING: Status: ACTIVE | Noted: 2024-02-07

## 2024-02-07 PROBLEM — C77.0: Status: ACTIVE | Noted: 2023-07-06

## 2024-02-07 PROBLEM — Z80.3 FAMILY HISTORY OF MALIGNANT NEOPLASM OF BREAST: Status: ACTIVE | Noted: 2023-03-06

## 2024-02-07 PROBLEM — K22.70 BARRETT'S ESOPHAGUS WITHOUT DYSPLASIA: Status: ACTIVE | Noted: 2024-02-07

## 2024-02-07 PROBLEM — G43.909 MIGRAINES: Status: ACTIVE | Noted: 2024-02-07

## 2024-02-07 PROBLEM — N85.9 OBSTETRIC DISORDER OF UTERUS (HHS-HCC): Status: ACTIVE | Noted: 2024-02-07

## 2024-02-07 PROBLEM — N95.1 MENOPAUSAL SYMPTOM: Status: ACTIVE | Noted: 2024-02-07

## 2024-02-07 PROBLEM — R19.4 CHANGE IN BOWEL HABIT: Status: ACTIVE | Noted: 2023-04-28

## 2024-02-07 PROBLEM — K44.9 DIAPHRAGMATIC HERNIA WITHOUT OBSTRUCTION OR GANGRENE: Status: ACTIVE | Noted: 2023-04-28

## 2024-02-07 PROBLEM — M85.80 OTHER SPECIFIED DISORDERS OF BONE DENSITY AND STRUCTURE, UNSPECIFIED SITE: Status: ACTIVE | Noted: 2023-08-04

## 2024-02-07 PROBLEM — K21.9 GASTRO-ESOPHAGEAL REFLUX DISEASE WITHOUT ESOPHAGITIS: Status: ACTIVE | Noted: 2024-02-07

## 2024-02-07 PROBLEM — K59.09 OTHER CONSTIPATION: Status: ACTIVE | Noted: 2024-02-07

## 2024-02-07 PROBLEM — I71.40 ABDOMINAL AORTIC ANEURYSM, WITHOUT RUPTURE, UNSPECIFIED (CMS-HCC): Status: ACTIVE | Noted: 2023-03-02

## 2024-02-07 PROBLEM — Z51.12 ENCOUNTER FOR ANTINEOPLASTIC IMMUNOTHERAPY: Status: ACTIVE | Noted: 2023-01-06

## 2024-02-07 PROBLEM — Z71.2 PERSON CONSULTING FOR EXPLANATION OF EXAMINATION OR TEST FINDINGS: Status: ACTIVE | Noted: 2023-07-10

## 2024-02-07 PROBLEM — R20.0 NUMBNESS OF FACE: Status: ACTIVE | Noted: 2024-02-07

## 2024-02-07 PROBLEM — R06.09 DYSPNEA ON EXERTION: Status: ACTIVE | Noted: 2023-08-22

## 2024-02-07 PROBLEM — R91.8 OTHER NONSPECIFIC ABNORMAL FINDING OF LUNG FIELD: Status: ACTIVE | Noted: 2023-07-06

## 2024-02-07 PROBLEM — I42.9 CARDIOMYOPATHY (MULTI): Status: ACTIVE | Noted: 2023-11-08

## 2024-02-07 PROBLEM — T82.868A THROMBOSIS DUE TO VASCULAR PROSTHETIC DEVICES, IMPLANTS AND GRAFTS, INITIAL ENCOUNTER (CMS-HCC): Status: ACTIVE | Noted: 2023-01-04

## 2024-02-07 PROBLEM — O99.891 OBSTETRIC DISORDER OF UTERUS (HHS-HCC): Status: ACTIVE | Noted: 2024-02-07

## 2024-02-07 PROBLEM — I71.21 ANEURYSM OF THE ASCENDING AORTA, WITHOUT RUPTURE (CMS-HCC): Status: ACTIVE | Noted: 2023-09-27

## 2024-02-07 PROBLEM — Z85.3 HISTORY OF MALIGNANT NEOPLASM OF BREAST: Status: ACTIVE | Noted: 2022-12-13

## 2024-02-07 PROBLEM — M75.100 NONTRAUMATIC TEAR OF ROTATOR CUFF: Status: ACTIVE | Noted: 2020-11-20

## 2024-02-07 PROBLEM — Z85.830: Status: ACTIVE | Noted: 2023-01-06

## 2024-02-07 PROBLEM — Z92.21 PERSONAL HISTORY OF ANTINEOPLASTIC CHEMOTHERAPY: Status: ACTIVE | Noted: 2023-11-08

## 2024-02-07 PROBLEM — Z90.12 ACQUIRED ABSENCE OF LEFT BREAST AND NIPPLE: Status: ACTIVE | Noted: 2023-01-06

## 2024-02-07 PROBLEM — K22.2 ESOPHAGEAL OBSTRUCTION: Status: ACTIVE | Noted: 2023-04-28

## 2024-02-07 PROBLEM — N39.0 RECURRENT URINARY TRACT INFECTION: Status: ACTIVE | Noted: 2023-08-22

## 2024-02-07 PROBLEM — R05.9 COUGH: Status: ACTIVE | Noted: 2024-02-07

## 2024-02-07 PROBLEM — J30.9 ALLERGIC RHINITIS: Status: ACTIVE | Noted: 2024-02-07

## 2024-02-07 PROBLEM — M54.9 BACK PAIN: Status: ACTIVE | Noted: 2024-02-07

## 2024-02-07 PROBLEM — K31.89 OTHER DISEASES OF STOMACH AND DUODENUM: Status: ACTIVE | Noted: 2023-04-28

## 2024-02-07 PROBLEM — I70.0 ATHEROSCLEROSIS OF AORTA (CMS-HCC): Status: ACTIVE | Noted: 2023-01-06

## 2024-02-07 PROCEDURE — 96401 CHEMO ANTI-NEOPL SQ/IM: CPT

## 2024-02-07 PROCEDURE — 2500000004 HC RX 250 GENERAL PHARMACY W/ HCPCS (ALT 636 FOR OP/ED): Performed by: STUDENT IN AN ORGANIZED HEALTH CARE EDUCATION/TRAINING PROGRAM

## 2024-02-07 PROCEDURE — 96372 THER/PROPH/DIAG INJ SC/IM: CPT | Performed by: STUDENT IN AN ORGANIZED HEALTH CARE EDUCATION/TRAINING PROGRAM

## 2024-02-07 RX ORDER — PROCHLORPERAZINE EDISYLATE 5 MG/ML
10 INJECTION INTRAMUSCULAR; INTRAVENOUS EVERY 6 HOURS PRN
Status: DISCONTINUED | OUTPATIENT
Start: 2024-02-07 | End: 2024-02-07 | Stop reason: HOSPADM

## 2024-02-07 RX ORDER — PROCHLORPERAZINE MALEATE 10 MG
10 TABLET ORAL EVERY 6 HOURS PRN
Status: DISCONTINUED | OUTPATIENT
Start: 2024-02-07 | End: 2024-02-07 | Stop reason: HOSPADM

## 2024-02-07 RX ORDER — ALBUTEROL SULFATE 0.83 MG/ML
3 SOLUTION RESPIRATORY (INHALATION) AS NEEDED
Status: DISCONTINUED | OUTPATIENT
Start: 2024-02-07 | End: 2024-02-07 | Stop reason: HOSPADM

## 2024-02-07 RX ORDER — EPINEPHRINE 0.3 MG/.3ML
0.3 INJECTION SUBCUTANEOUS EVERY 5 MIN PRN
Status: DISCONTINUED | OUTPATIENT
Start: 2024-02-07 | End: 2024-02-07 | Stop reason: HOSPADM

## 2024-02-07 RX ORDER — AMITRIPTYLINE HYDROCHLORIDE 10 MG/1
TABLET, FILM COATED ORAL
COMMUNITY
Start: 2023-11-29

## 2024-02-07 RX ORDER — DIPHENHYDRAMINE HYDROCHLORIDE 50 MG/ML
50 INJECTION INTRAMUSCULAR; INTRAVENOUS AS NEEDED
Status: DISCONTINUED | OUTPATIENT
Start: 2024-02-07 | End: 2024-02-07 | Stop reason: HOSPADM

## 2024-02-07 RX ORDER — FAMOTIDINE 10 MG/ML
20 INJECTION INTRAVENOUS ONCE AS NEEDED
Status: DISCONTINUED | OUTPATIENT
Start: 2024-02-07 | End: 2024-02-07 | Stop reason: HOSPADM

## 2024-02-07 RX ADMIN — PERTUZUMAB, TRASTUZUMAB, AND HYALURONIDASE-ZZXF 10 ML: 600; 600; 2000 INJECTION, SOLUTION SUBCUTANEOUS at 10:52

## 2024-02-07 ASSESSMENT — PAIN SCALES - GENERAL: PAINLEVEL: 0-NO PAIN

## 2024-02-07 NOTE — PROGRESS NOTES
Per Dr. Dye written order - Ok to treat with recent echo, patient follows with on[facility]-cards. Please see Dr. Pack note 11/8/23.       No complication with previous inj.    Patient is here today for infusion - no complication since last being seen-  independant double check done prior to chemotherapy today-   b/h/ lung sounds not auscultated  patient verbalizes understanding of plan of care

## 2024-02-28 ENCOUNTER — INFUSION (OUTPATIENT)
Dept: HEMATOLOGY/ONCOLOGY | Facility: CLINIC | Age: 79
End: 2024-02-28
Payer: MEDICARE

## 2024-02-28 ENCOUNTER — TELEPHONE (OUTPATIENT)
Dept: SCHEDULING | Age: 79
End: 2024-02-28

## 2024-02-28 VITALS
DIASTOLIC BLOOD PRESSURE: 88 MMHG | WEIGHT: 129.08 LBS | BODY MASS INDEX: 24.43 KG/M2 | RESPIRATION RATE: 14 BRPM | SYSTOLIC BLOOD PRESSURE: 144 MMHG | TEMPERATURE: 97.5 F | HEART RATE: 81 BPM | OXYGEN SATURATION: 97 %

## 2024-02-28 DIAGNOSIS — Z17.1 MALIGNANT NEOPLASM OF LEFT BREAST IN FEMALE, ESTROGEN RECEPTOR NEGATIVE, UNSPECIFIED SITE OF BREAST (MULTI): ICD-10-CM

## 2024-02-28 DIAGNOSIS — C50.912 MALIGNANT NEOPLASM OF LEFT BREAST IN FEMALE, ESTROGEN RECEPTOR NEGATIVE, UNSPECIFIED SITE OF BREAST (MULTI): ICD-10-CM

## 2024-02-28 DIAGNOSIS — C50.919 BREAST CANCER METASTASIZED TO MULTIPLE SITES, UNSPECIFIED LATERALITY (MULTI): ICD-10-CM

## 2024-02-28 PROCEDURE — 96372 THER/PROPH/DIAG INJ SC/IM: CPT | Performed by: STUDENT IN AN ORGANIZED HEALTH CARE EDUCATION/TRAINING PROGRAM

## 2024-02-28 PROCEDURE — 2500000004 HC RX 250 GENERAL PHARMACY W/ HCPCS (ALT 636 FOR OP/ED): Mod: JZ,JG | Performed by: STUDENT IN AN ORGANIZED HEALTH CARE EDUCATION/TRAINING PROGRAM

## 2024-02-28 PROCEDURE — 96401 CHEMO ANTI-NEOPL SQ/IM: CPT

## 2024-02-28 RX ORDER — PROCHLORPERAZINE EDISYLATE 5 MG/ML
10 INJECTION INTRAMUSCULAR; INTRAVENOUS EVERY 6 HOURS PRN
Status: DISCONTINUED | OUTPATIENT
Start: 2024-02-28 | End: 2024-02-28 | Stop reason: HOSPADM

## 2024-02-28 RX ORDER — DIPHENHYDRAMINE HYDROCHLORIDE 50 MG/ML
50 INJECTION INTRAMUSCULAR; INTRAVENOUS AS NEEDED
Status: DISCONTINUED | OUTPATIENT
Start: 2024-02-28 | End: 2024-02-28 | Stop reason: HOSPADM

## 2024-02-28 RX ORDER — ALBUTEROL SULFATE 0.83 MG/ML
3 SOLUTION RESPIRATORY (INHALATION) AS NEEDED
Status: DISCONTINUED | OUTPATIENT
Start: 2024-02-28 | End: 2024-02-28 | Stop reason: HOSPADM

## 2024-02-28 RX ORDER — EPINEPHRINE 0.3 MG/.3ML
0.3 INJECTION SUBCUTANEOUS EVERY 5 MIN PRN
Status: DISCONTINUED | OUTPATIENT
Start: 2024-02-28 | End: 2024-02-28 | Stop reason: HOSPADM

## 2024-02-28 RX ORDER — PROCHLORPERAZINE MALEATE 10 MG
10 TABLET ORAL EVERY 6 HOURS PRN
Status: DISCONTINUED | OUTPATIENT
Start: 2024-02-28 | End: 2024-02-28 | Stop reason: HOSPADM

## 2024-02-28 RX ORDER — FAMOTIDINE 10 MG/ML
20 INJECTION INTRAVENOUS ONCE AS NEEDED
Status: DISCONTINUED | OUTPATIENT
Start: 2024-02-28 | End: 2024-02-28 | Stop reason: HOSPADM

## 2024-02-28 RX ADMIN — PERTUZUMAB, TRASTUZUMAB, AND HYALURONIDASE-ZZXF 10 ML: 600; 600; 2000 INJECTION, SOLUTION SUBCUTANEOUS at 11:03

## 2024-02-28 ASSESSMENT — PAIN SCALES - GENERAL: PAINLEVEL: 0-NO PAIN

## 2024-02-28 NOTE — PROGRESS NOTES
Patient tolerated injection without incident. Patient independently ambulatory off unit in NAD and without complaints.  Aware of next treatment appt date/time.  Gait steady.  Patient verbalized understanding. Call back instructions reviewed.

## 2024-02-28 NOTE — TELEPHONE ENCOUNTER
Patient called in to ask about moving her FUV and treatment from 04/29 to another date. She will be out of town on this date and cannot keep these appts. She asked if she could have her treatment on a different date then her FUV as well.     I told her I would reach out to the clinical team to confirm okay to change her appts and we would get back to her. She understood and awaits follow up from us.

## 2024-03-20 ENCOUNTER — INFUSION (OUTPATIENT)
Dept: HEMATOLOGY/ONCOLOGY | Facility: CLINIC | Age: 79
End: 2024-03-20
Payer: MEDICARE

## 2024-03-20 VITALS
TEMPERATURE: 97.2 F | WEIGHT: 126.54 LBS | SYSTOLIC BLOOD PRESSURE: 144 MMHG | DIASTOLIC BLOOD PRESSURE: 93 MMHG | RESPIRATION RATE: 14 BRPM | BODY MASS INDEX: 23.95 KG/M2 | HEART RATE: 87 BPM | OXYGEN SATURATION: 96 %

## 2024-03-20 DIAGNOSIS — Z17.1 MALIGNANT NEOPLASM OF LEFT BREAST IN FEMALE, ESTROGEN RECEPTOR NEGATIVE, UNSPECIFIED SITE OF BREAST (MULTI): ICD-10-CM

## 2024-03-20 DIAGNOSIS — C50.912 MALIGNANT NEOPLASM OF LEFT BREAST IN FEMALE, ESTROGEN RECEPTOR NEGATIVE, UNSPECIFIED SITE OF BREAST (MULTI): ICD-10-CM

## 2024-03-20 DIAGNOSIS — C50.919 BREAST CANCER METASTASIZED TO MULTIPLE SITES, UNSPECIFIED LATERALITY (MULTI): ICD-10-CM

## 2024-03-20 PROCEDURE — 96372 THER/PROPH/DIAG INJ SC/IM: CPT | Performed by: STUDENT IN AN ORGANIZED HEALTH CARE EDUCATION/TRAINING PROGRAM

## 2024-03-20 PROCEDURE — 2500000004 HC RX 250 GENERAL PHARMACY W/ HCPCS (ALT 636 FOR OP/ED): Mod: JZ,JG | Performed by: STUDENT IN AN ORGANIZED HEALTH CARE EDUCATION/TRAINING PROGRAM

## 2024-03-20 PROCEDURE — 96401 CHEMO ANTI-NEOPL SQ/IM: CPT

## 2024-03-20 RX ORDER — FAMOTIDINE 10 MG/ML
20 INJECTION INTRAVENOUS ONCE AS NEEDED
Status: DISCONTINUED | OUTPATIENT
Start: 2024-03-20 | End: 2024-03-20 | Stop reason: HOSPADM

## 2024-03-20 RX ORDER — DIPHENHYDRAMINE HYDROCHLORIDE 50 MG/ML
50 INJECTION INTRAMUSCULAR; INTRAVENOUS AS NEEDED
Status: DISCONTINUED | OUTPATIENT
Start: 2024-03-20 | End: 2024-03-20 | Stop reason: HOSPADM

## 2024-03-20 RX ORDER — PROCHLORPERAZINE MALEATE 10 MG
10 TABLET ORAL EVERY 6 HOURS PRN
Status: DISCONTINUED | OUTPATIENT
Start: 2024-03-20 | End: 2024-03-20 | Stop reason: HOSPADM

## 2024-03-20 RX ORDER — PROCHLORPERAZINE EDISYLATE 5 MG/ML
10 INJECTION INTRAMUSCULAR; INTRAVENOUS EVERY 6 HOURS PRN
Status: DISCONTINUED | OUTPATIENT
Start: 2024-03-20 | End: 2024-03-20 | Stop reason: HOSPADM

## 2024-03-20 RX ORDER — EPINEPHRINE 0.3 MG/.3ML
0.3 INJECTION SUBCUTANEOUS EVERY 5 MIN PRN
Status: DISCONTINUED | OUTPATIENT
Start: 2024-03-20 | End: 2024-03-20 | Stop reason: HOSPADM

## 2024-03-20 RX ORDER — ALBUTEROL SULFATE 0.83 MG/ML
3 SOLUTION RESPIRATORY (INHALATION) AS NEEDED
Status: DISCONTINUED | OUTPATIENT
Start: 2024-03-20 | End: 2024-03-20 | Stop reason: HOSPADM

## 2024-03-20 RX ADMIN — PERTUZUMAB, TRASTUZUMAB, AND HYALURONIDASE-ZZXF 10 ML: 600; 600; 2000 INJECTION, SOLUTION SUBCUTANEOUS at 11:34

## 2024-03-20 ASSESSMENT — PAIN SCALES - GENERAL: PAINLEVEL: 0-NO PAIN

## 2024-03-20 NOTE — PROGRESS NOTES
1146 patient tolerated injection without complaints. Aware of next appt date/time. Patient independently ambulatory off unit in NAD and without complaints.  Gait steady.  Call back instructions reviewed.  Patient verbalized understanding.

## 2024-03-21 DIAGNOSIS — Z17.1 MALIGNANT NEOPLASM OF LEFT BREAST IN FEMALE, ESTROGEN RECEPTOR NEGATIVE, UNSPECIFIED SITE OF BREAST (MULTI): ICD-10-CM

## 2024-03-21 DIAGNOSIS — C50.912 MALIGNANT NEOPLASM OF LEFT BREAST IN FEMALE, ESTROGEN RECEPTOR NEGATIVE, UNSPECIFIED SITE OF BREAST (MULTI): ICD-10-CM

## 2024-04-02 ENCOUNTER — OFFICE (OUTPATIENT)
Dept: URBAN - METROPOLITAN AREA CLINIC 26 | Facility: CLINIC | Age: 79
End: 2024-04-02
Payer: MEDICARE

## 2024-04-02 VITALS
RESPIRATION RATE: 9 BRPM | HEART RATE: 81 BPM | RESPIRATION RATE: 9 BRPM | WEIGHT: 123 LBS | HEART RATE: 65 BPM | OXYGEN SATURATION: 93 % | SYSTOLIC BLOOD PRESSURE: 119 MMHG | SYSTOLIC BLOOD PRESSURE: 131 MMHG | RESPIRATION RATE: 5 BRPM | RESPIRATION RATE: 17 BRPM | DIASTOLIC BLOOD PRESSURE: 90 MMHG | DIASTOLIC BLOOD PRESSURE: 77 MMHG | DIASTOLIC BLOOD PRESSURE: 72 MMHG | HEIGHT: 63 IN | HEART RATE: 79 BPM | DIASTOLIC BLOOD PRESSURE: 76 MMHG | SYSTOLIC BLOOD PRESSURE: 131 MMHG | DIASTOLIC BLOOD PRESSURE: 72 MMHG | SYSTOLIC BLOOD PRESSURE: 110 MMHG | RESPIRATION RATE: 5 BRPM | OXYGEN SATURATION: 100 % | OXYGEN SATURATION: 97 % | HEART RATE: 85 BPM | HEART RATE: 87 BPM | OXYGEN SATURATION: 96 % | HEART RATE: 81 BPM | OXYGEN SATURATION: 89 % | OXYGEN SATURATION: 98 % | RESPIRATION RATE: 12 BRPM | HEART RATE: 70 BPM | SYSTOLIC BLOOD PRESSURE: 119 MMHG | HEART RATE: 85 BPM | DIASTOLIC BLOOD PRESSURE: 80 MMHG | HEIGHT: 63 IN | DIASTOLIC BLOOD PRESSURE: 69 MMHG | OXYGEN SATURATION: 100 % | SYSTOLIC BLOOD PRESSURE: 120 MMHG | HEART RATE: 64 BPM | TEMPERATURE: 97.3 F | SYSTOLIC BLOOD PRESSURE: 138 MMHG | OXYGEN SATURATION: 96 % | DIASTOLIC BLOOD PRESSURE: 69 MMHG | SYSTOLIC BLOOD PRESSURE: 138 MMHG | RESPIRATION RATE: 17 BRPM | OXYGEN SATURATION: 96 % | SYSTOLIC BLOOD PRESSURE: 128 MMHG | DIASTOLIC BLOOD PRESSURE: 71 MMHG | RESPIRATION RATE: 12 BRPM | SYSTOLIC BLOOD PRESSURE: 110 MMHG | DIASTOLIC BLOOD PRESSURE: 94 MMHG | DIASTOLIC BLOOD PRESSURE: 94 MMHG | HEART RATE: 84 BPM | SYSTOLIC BLOOD PRESSURE: 110 MMHG | SYSTOLIC BLOOD PRESSURE: 103 MMHG | DIASTOLIC BLOOD PRESSURE: 94 MMHG | SYSTOLIC BLOOD PRESSURE: 119 MMHG | DIASTOLIC BLOOD PRESSURE: 58 MMHG | RESPIRATION RATE: 11 BRPM | DIASTOLIC BLOOD PRESSURE: 80 MMHG | DIASTOLIC BLOOD PRESSURE: 58 MMHG | HEART RATE: 79 BPM | TEMPERATURE: 97.3 F | HEART RATE: 69 BPM | HEART RATE: 78 BPM | SYSTOLIC BLOOD PRESSURE: 128 MMHG | OXYGEN SATURATION: 97 % | SYSTOLIC BLOOD PRESSURE: 128 MMHG | HEART RATE: 64 BPM | SYSTOLIC BLOOD PRESSURE: 120 MMHG | HEART RATE: 70 BPM | HEIGHT: 63 IN | OXYGEN SATURATION: 89 % | DIASTOLIC BLOOD PRESSURE: 76 MMHG | HEART RATE: 78 BPM | HEART RATE: 70 BPM | DIASTOLIC BLOOD PRESSURE: 71 MMHG | OXYGEN SATURATION: 97 % | HEART RATE: 82 BPM | HEART RATE: 71 BPM | OXYGEN SATURATION: 93 % | DIASTOLIC BLOOD PRESSURE: 85 MMHG | RESPIRATION RATE: 12 BRPM | DIASTOLIC BLOOD PRESSURE: 72 MMHG | OXYGEN SATURATION: 99 % | HEART RATE: 69 BPM | HEART RATE: 78 BPM | OXYGEN SATURATION: 93 % | WEIGHT: 123 LBS | RESPIRATION RATE: 13 BRPM | SYSTOLIC BLOOD PRESSURE: 138 MMHG | SYSTOLIC BLOOD PRESSURE: 150 MMHG | DIASTOLIC BLOOD PRESSURE: 80 MMHG | SYSTOLIC BLOOD PRESSURE: 150 MMHG | OXYGEN SATURATION: 99 % | HEART RATE: 65 BPM | HEART RATE: 84 BPM | DIASTOLIC BLOOD PRESSURE: 85 MMHG | TEMPERATURE: 97.3 F | HEART RATE: 84 BPM | SYSTOLIC BLOOD PRESSURE: 137 MMHG | RESPIRATION RATE: 13 BRPM | SYSTOLIC BLOOD PRESSURE: 120 MMHG | HEART RATE: 87 BPM | RESPIRATION RATE: 17 BRPM | RESPIRATION RATE: 11 BRPM | DIASTOLIC BLOOD PRESSURE: 71 MMHG | DIASTOLIC BLOOD PRESSURE: 90 MMHG | HEART RATE: 64 BPM | OXYGEN SATURATION: 98 % | SYSTOLIC BLOOD PRESSURE: 103 MMHG | OXYGEN SATURATION: 100 % | OXYGEN SATURATION: 99 % | HEART RATE: 85 BPM | OXYGEN SATURATION: 89 % | HEART RATE: 79 BPM | SYSTOLIC BLOOD PRESSURE: 150 MMHG | HEART RATE: 82 BPM | HEART RATE: 82 BPM | DIASTOLIC BLOOD PRESSURE: 58 MMHG | RESPIRATION RATE: 13 BRPM | HEART RATE: 81 BPM | RESPIRATION RATE: 5 BRPM | HEART RATE: 71 BPM | HEART RATE: 69 BPM | DIASTOLIC BLOOD PRESSURE: 69 MMHG | DIASTOLIC BLOOD PRESSURE: 85 MMHG | SYSTOLIC BLOOD PRESSURE: 103 MMHG | DIASTOLIC BLOOD PRESSURE: 77 MMHG | DIASTOLIC BLOOD PRESSURE: 77 MMHG | SYSTOLIC BLOOD PRESSURE: 137 MMHG | HEART RATE: 71 BPM | DIASTOLIC BLOOD PRESSURE: 76 MMHG | DIASTOLIC BLOOD PRESSURE: 90 MMHG | RESPIRATION RATE: 9 BRPM | WEIGHT: 123 LBS | RESPIRATION RATE: 11 BRPM | SYSTOLIC BLOOD PRESSURE: 131 MMHG | HEART RATE: 87 BPM | SYSTOLIC BLOOD PRESSURE: 137 MMHG | OXYGEN SATURATION: 98 % | HEART RATE: 65 BPM

## 2024-04-02 VITALS
HEART RATE: 86 BPM | DIASTOLIC BLOOD PRESSURE: 84 MMHG | WEIGHT: 126 LBS | SYSTOLIC BLOOD PRESSURE: 134 MMHG | HEIGHT: 63 IN

## 2024-04-02 DIAGNOSIS — K59.09 OTHER CONSTIPATION: ICD-10-CM

## 2024-04-02 DIAGNOSIS — K22.2 ESOPHAGEAL OBSTRUCTION: ICD-10-CM

## 2024-04-02 DIAGNOSIS — K63.5 POLYP OF COLON: ICD-10-CM

## 2024-04-02 DIAGNOSIS — K76.9 LIVER DISEASE, UNSPECIFIED: ICD-10-CM

## 2024-04-02 DIAGNOSIS — R13.10 DYSPHAGIA, UNSPECIFIED: ICD-10-CM

## 2024-04-02 DIAGNOSIS — K29.70 GASTRITIS, UNSPECIFIED, WITHOUT BLEEDING: ICD-10-CM

## 2024-04-02 DIAGNOSIS — K22.70 BARRETT'S ESOPHAGUS WITHOUT DYSPLASIA: ICD-10-CM

## 2024-04-02 DIAGNOSIS — K21.9 GASTRO-ESOPHAGEAL REFLUX DISEASE WITHOUT ESOPHAGITIS: ICD-10-CM

## 2024-04-02 DIAGNOSIS — K86.9 DISEASE OF PANCREAS, UNSPECIFIED: ICD-10-CM

## 2024-04-02 PROCEDURE — 99214 OFFICE O/P EST MOD 30 MIN: CPT | Performed by: NURSE PRACTITIONER

## 2024-04-02 RX ORDER — SUCRALFATE 1 G/1
TABLET ORAL
Qty: 60 | Refills: 0 | Status: ACTIVE
Start: 2024-04-02

## 2024-04-03 ENCOUNTER — OFFICE VISIT (OUTPATIENT)
Dept: CARDIOLOGY | Facility: CLINIC | Age: 79
End: 2024-04-03
Payer: MEDICARE

## 2024-04-03 ENCOUNTER — APPOINTMENT (OUTPATIENT)
Dept: CARDIOLOGY | Facility: CLINIC | Age: 79
End: 2024-04-03
Payer: MEDICARE

## 2024-04-03 ENCOUNTER — HOSPITAL ENCOUNTER (OUTPATIENT)
Dept: CARDIOLOGY | Facility: HOSPITAL | Age: 79
Discharge: HOME | End: 2024-04-03
Payer: MEDICARE

## 2024-04-03 VITALS
BODY MASS INDEX: 24.12 KG/M2 | HEART RATE: 82 BPM | RESPIRATION RATE: 16 BRPM | WEIGHT: 127.43 LBS | SYSTOLIC BLOOD PRESSURE: 126 MMHG | TEMPERATURE: 98.8 F | OXYGEN SATURATION: 98 % | DIASTOLIC BLOOD PRESSURE: 92 MMHG

## 2024-04-03 DIAGNOSIS — Z92.21 STATUS POST ADMINISTRATION OF CARDIOTOXIC CHEMOTHERAPY: ICD-10-CM

## 2024-04-03 DIAGNOSIS — C50.019: ICD-10-CM

## 2024-04-03 DIAGNOSIS — Z01.89 ENCOUNTER FOR OTHER SPECIFIED SPECIAL EXAMINATIONS: ICD-10-CM

## 2024-04-03 DIAGNOSIS — I77.810 THORACIC AORTIC ECTASIA (CMS-HCC): ICD-10-CM

## 2024-04-03 DIAGNOSIS — Z51.81 ENCOUNTER FOR MONITORING CARDIOTOXIC DRUG THERAPY: ICD-10-CM

## 2024-04-03 DIAGNOSIS — I42.7 CARDIOTOXICITY (MULTI): Primary | ICD-10-CM

## 2024-04-03 DIAGNOSIS — Z79.899 ENCOUNTER FOR MONITORING CARDIOTOXIC DRUG THERAPY: ICD-10-CM

## 2024-04-03 DIAGNOSIS — R94.30 LOW LEFT VENTRICULAR EJECTION FRACTION: ICD-10-CM

## 2024-04-03 DIAGNOSIS — I77.819 DILATION OF AORTA (CMS-HCC): ICD-10-CM

## 2024-04-03 DIAGNOSIS — I42.0 DILATED CARDIOMYOPATHY (MULTI): ICD-10-CM

## 2024-04-03 DIAGNOSIS — I42.7 CARDIOMYOPATHY DUE TO DRUG AND EXTERNAL AGENT (MULTI): ICD-10-CM

## 2024-04-03 LAB
AORTIC VALVE MEAN GRADIENT: 2.1 MMHG
AORTIC VALVE PEAK VELOCITY: 1.01 M/S
AV PEAK GRADIENT: 4.1 MMHG
AVA (PEAK VEL): 2.75 CM2
AVA (VTI): 3.01 CM2
EJECTION FRACTION APICAL 4 CHAMBER: 45.4
GLOBAL LONGITUDINAL STRAIN: 12.8 %
LEFT VENTRICLE INTERNAL DIMENSION DIASTOLE: 4.25 CM (ref 3.5–6)
LEFT VENTRICULAR OUTFLOW TRACT DIAMETER: 2.14 CM
LV EJECTION FRACTION BIPLANE: 47 %
MITRAL VALVE E/A RATIO: 0.42
MITRAL VALVE E/E' RATIO: 11.96
RIGHT VENTRICLE PEAK SYSTOLIC PRESSURE: 36.5 MMHG

## 2024-04-03 PROCEDURE — 99215 OFFICE O/P EST HI 40 MIN: CPT | Performed by: STUDENT IN AN ORGANIZED HEALTH CARE EDUCATION/TRAINING PROGRAM

## 2024-04-03 PROCEDURE — 3074F SYST BP LT 130 MM HG: CPT | Performed by: STUDENT IN AN ORGANIZED HEALTH CARE EDUCATION/TRAINING PROGRAM

## 2024-04-03 PROCEDURE — 93010 ELECTROCARDIOGRAM REPORT: CPT | Performed by: STUDENT IN AN ORGANIZED HEALTH CARE EDUCATION/TRAINING PROGRAM

## 2024-04-03 PROCEDURE — 93005 ELECTROCARDIOGRAM TRACING: CPT | Performed by: STUDENT IN AN ORGANIZED HEALTH CARE EDUCATION/TRAINING PROGRAM

## 2024-04-03 PROCEDURE — 3080F DIAST BP >= 90 MM HG: CPT | Performed by: STUDENT IN AN ORGANIZED HEALTH CARE EDUCATION/TRAINING PROGRAM

## 2024-04-03 PROCEDURE — 93321 DOPPLER ECHO F-UP/LMTD STD: CPT | Performed by: STUDENT IN AN ORGANIZED HEALTH CARE EDUCATION/TRAINING PROGRAM

## 2024-04-03 PROCEDURE — 76376 3D RENDER W/INTRP POSTPROCES: CPT | Performed by: STUDENT IN AN ORGANIZED HEALTH CARE EDUCATION/TRAINING PROGRAM

## 2024-04-03 PROCEDURE — 1126F AMNT PAIN NOTED NONE PRSNT: CPT | Performed by: STUDENT IN AN ORGANIZED HEALTH CARE EDUCATION/TRAINING PROGRAM

## 2024-04-03 PROCEDURE — 1159F MED LIST DOCD IN RCRD: CPT | Performed by: STUDENT IN AN ORGANIZED HEALTH CARE EDUCATION/TRAINING PROGRAM

## 2024-04-03 PROCEDURE — 93308 TTE F-UP OR LMTD: CPT | Performed by: STUDENT IN AN ORGANIZED HEALTH CARE EDUCATION/TRAINING PROGRAM

## 2024-04-03 PROCEDURE — 1036F TOBACCO NON-USER: CPT | Performed by: STUDENT IN AN ORGANIZED HEALTH CARE EDUCATION/TRAINING PROGRAM

## 2024-04-03 PROCEDURE — 76376 3D RENDER W/INTRP POSTPROCES: CPT

## 2024-04-03 PROCEDURE — 93325 DOPPLER ECHO COLOR FLOW MAPG: CPT | Performed by: STUDENT IN AN ORGANIZED HEALTH CARE EDUCATION/TRAINING PROGRAM

## 2024-04-03 PROCEDURE — 93356 MYOCRD STRAIN IMG SPCKL TRCK: CPT | Performed by: STUDENT IN AN ORGANIZED HEALTH CARE EDUCATION/TRAINING PROGRAM

## 2024-04-03 ASSESSMENT — ENCOUNTER SYMPTOMS
DEPRESSION: 0
OCCASIONAL FEELINGS OF UNSTEADINESS: 0
LOSS OF SENSATION IN FEET: 0

## 2024-04-03 ASSESSMENT — PAIN SCALES - GENERAL: PAINLEVEL: 0-NO PAIN

## 2024-04-03 NOTE — PROGRESS NOTES
Subjective   Linda C Hegarty is a 78 y.o. female     Patient has a medical history of Paget's disease of the left breast.  Left breast mastectomy with sentinel lymph node biopsy performed March 2012.  Has been on Herceptin/Perjeta since 2015; now on Phesgo (Pertuzumab/trastuzumab/hyaluronidase).    Echocardiogram dated October 2023 shows low normal ejection fraction on my review (45 to 50%).  Appears largely identical to echocardiogram dated March 2023.  Ascending aortic aneurysm measuring up to 4.3 cm noted on CT chest with contrast September 2023.  (Largely stable compared to scans dated January 2023 & October 2022).    On this visit patient denies any limitations with walking.  Denies any chest discomfort or shortness breath.  Denies any orthopnea/PND.  Has minimal lower extremity edema.  Notably was previously on higher doses of ramipril/Coreg with resultant dizziness and falls.  Has not had any of those complaints since down titration of those medications.    Current Outpatient Medications   Medication Instructions    amitriptyline (Elavil) 10 mg tablet TAKE ONE TABLET BY MOUTH DAILY AT BEDTIME; take in addition to 25mg tablet if needed    amoxicillin (AMOXIL) 500 mg, oral, 3 times daily, Dental visit or UTI    ascorbic acid (VITAMIN C) 1,000 mg, oral    aspirin 81 mg chewable tablet 1 tablet, oral, Daily    atorvastatin (LIPITOR) 40 mg, oral, Nightly    calcium citrate (Calcitrate) 200 mg (950 mg) tablet 1 tablet, oral, Daily    carvedilol (COREG) 3.125 mg, oral, 2 times daily with meals    dexlansoprazole (Dexilant) 60 mg DR capsule TAKE ONE CAPSULE BY MOUTH 30 MINUTES BEFORE BREAKFAST DAILY    diphenhydrAMINE (BENADRYL) 25 mg, oral, Nightly    famotidine (PEPCID) 40 mg, oral, Daily    fexofenadine (ALLEGRA) 180 mg, oral, Daily RT    hydrOXYzine HCL (Atarax) 25 mg tablet 1 Tablet    Lactobacillus acidophilus (PROBIOTIC ORAL) oral, Chewable tablet    lansoprazole (PREVACID) 30 mg, oral, 2 times daily     nitrofurantoin, macrocrystal-monohydrate, (Macrobid) 100 mg capsule TAKE 1 CAPSULE WITH FOOD BY MOUTH EVERY 12 HOURS FOR 5 DAYS    ondansetron (ZOFRAN) 4 mg, oral, Every 8 hours PRN    PERTUZUMAB IV 1 Dose, intravenous    Premarin vaginal cream INSERT 0.5 GRAMS VAGINALLY ONCE DAILY FOR 2 WEEKS  THEN DECREASE TO 0.5 GRAMS ONCE A  WEEK    ramipril (ALTACE) 2.5 mg, oral, Daily    rizatriptan MLT (Maxalt-MLT) 10 mg disintegrating tablet DISSOLVE ONE TABLET IN MOUTH ONCE DAILY AS NEEDED    sulfamethoxazole-trimethoprim (Bactrim) 400-80 mg tablet 0.5 tablets, oral, Daily    TRASTUZUMAB IV 2 mg/kg, intravenous         Review of Systems  A comprehensive review of systems was negative.     Past Medical History:   Diagnosis Date    Malignant neoplasm of nipple and areola, unspecified female breast (CMS/HCC)     Pagets disease, breast    Other abnormal and inconclusive findings on diagnostic imaging of breast 10/24/2017    Abnormal mammogram of right breast    Other conditions influencing health status 09/10/2014    History of pregnancy    Personal history of other complications of pregnancy, childbirth and the puerperium 09/10/2014    History of spontaneous     Personal history of urinary (tract) infections 09/10/2014    History of recurrent UTI (urinary tract infection)       Past Surgical History:   Procedure Laterality Date    BLADDER SURGERY  09/10/2014    Bladder Surgery    HYSTERECTOMY  09/10/2014    Hysterectomy    IR CVC PORT REMOVAL  10/16/2023    IR CVC PORT REMOVAL 10/16/2023 Emily Ray MD PAR ANGIO    LYMPH NODE BIOPSY  2014    Biopsy Lymph Node    MR HEAD ANGIO WO IV CONTRAST  2023    MR HEAD ANGIO WO IV CONTRAST 2023    OTHER SURGICAL HISTORY  2014    Radical Mastectomy Left Breast    OTHER SURGICAL HISTORY  2017    Central Intravenous Catheter    OTHER SURGICAL HISTORY  09/10/2014    Complete Colonoscopy Rectum       Allergies   Allergen Reactions    Codeine  Unknown, Nausea/vomiting and Rash    Insects Extract Unknown    Meperidine Unknown and Confusion    Morphine Unknown and Confusion    Pollens Extract Other       Family History   Problem Relation Name Age of Onset    Diabetes Mother      Heart failure Father      Breast cancer Sister      Diabetes Other aunt        Objective   Visit Vitals  BP (!) 126/92   Pulse 82   Temp 37.1 °C (98.8 °F) (Core)   Resp 16   Wt 57.8 kg (127 lb 6.8 oz)   SpO2 98%   BMI 24.12 kg/m²   OB Status Postmenopausal   Smoking Status Former   BSA 1.58 m²     General: awake, alert and oriented. No acute distress.   Skin: Skin is warm, dry and intact without rashes or lesions. Appropriate color for ethnicity. Nail beds pink with no cyanosis or clubbing  HEENT: normocephalic, atraumatic; conjunctivae are clear without exudates or hemorrhage. Sclera is non-icteric. Eyelids are normal in appearance without swelling or lesions. Hearing intact. Nares are patent bilaterally. Moist mucous membranes.   Cardiovascular: Regular. No murmurs, gallops, or rubs are auscultated. S1 and S2 are heard and are of normal intensity. No JVD, no carotid bruits  Respiratory: Thorax symmetric. CTAB, breath sounds vesicular. No crackles, wheezes or ronchi.   Gastrointestinal: soft, non-distended, BS + x 4  Genitourinary: exam deferred  Musculoskeletal: moves all extremities  Extremities: pulses palpable bilaterally; no swelling or erythema; no edema  Neurological: alert & oriented x 3; no focal deficits  Psychiatric: appropriate mood and affect      Lab Review   Lab Results   Component Value Date    HGB 11.8 (L) 12/06/2023    HGB CANCELED 09/26/2023    HGB 12.3 09/25/2023    HGB 11.6 (L) 08/04/2023     12/06/2023    WBC 6.4 12/06/2023     12/06/2023    K 4.1 12/06/2023    CREATININE 1.16 (H) 12/06/2023    CREATININE CANCELED 09/27/2023    CREATININE 1.18 (H) 09/25/2023    CREATININE 0.89 08/04/2023    BUN 19 12/06/2023    CALCIUM 9.6 12/06/2023    BNP 15  03/29/2023    TROPHS 4 03/29/2023    LDLF 58 11/30/2021        Assessment/Plan   Patient has a medical history of Paget's disease of the left breast.  Left breast mastectomy with sentinel lymph node biopsy performed March 2012.  Has been on Herceptin/Perjeta since 2015; now on Phesgo (Pertuzumab/trastuzumab/hyaluronidase).    Echocardiogram dated October 2023 shows low normal ejection fraction on my review (45 to 50%).  Appears largely identical to echocardiogram dated March 2023.  Ascending aortic aneurysm measuring up to 4.3 cm noted on CT chest with contrast September 2023.  (Largely stable compared to scans dated January 2023 & October 2022).  On this visit patient denies any limitations with walking.  Denies any chest discomfort or shortness breath.  Denies any orthopnea/PND.  Has minimal lower extremity edema.  Notably was previously on higher doses of ramipril/Coreg with resultant dizziness and falls.  Has not had any of those complaints since down titration of those medications.    Plan:  -Monitoring cardiotoxic chemotherapy: Echocardiogram today 4/3/2024 appears slightly more hypokinetic compared to her echocardiogram from October 2024 (50% to 45%) her symptoms are largely the same and she does not appear volume overloaded.  -With a marginal~5% drop in ejection fraction and her being asymptomatic-okay to continue trastuzumab based therapies at this time.  Will add Jardiance 10 mg, check a BNP, and intensify surveillance with next echocardiogram/visit in 3 months  -Needs repeat CT evaluation of her cancer; will monitor her aneurysm based on those scans  -Blood pressure is well controlled  -RTC 3 months with echo as noted above     Lemuel Pack MD  Advanced Heart Failure/Transplant Cardiology  Cardio-Oncology  [facility]

## 2024-04-04 ENCOUNTER — APPOINTMENT (OUTPATIENT)
Dept: CARDIOLOGY | Facility: CLINIC | Age: 79
End: 2024-04-04
Payer: MEDICARE

## 2024-04-04 ENCOUNTER — OFFICE (OUTPATIENT)
Dept: URBAN - METROPOLITAN AREA PATHOLOGY 2 | Facility: PATHOLOGY | Age: 79
End: 2024-04-04
Payer: MEDICARE

## 2024-04-04 ENCOUNTER — AMBULATORY SURGICAL CENTER (OUTPATIENT)
Dept: URBAN - METROPOLITAN AREA SURGERY 12 | Facility: SURGERY | Age: 79
End: 2024-04-04

## 2024-04-04 DIAGNOSIS — R13.10 DYSPHAGIA, UNSPECIFIED: ICD-10-CM

## 2024-04-04 DIAGNOSIS — K31.89 OTHER DISEASES OF STOMACH AND DUODENUM: ICD-10-CM

## 2024-04-04 DIAGNOSIS — K22.2 ESOPHAGEAL OBSTRUCTION: ICD-10-CM

## 2024-04-04 DIAGNOSIS — K21.9 GASTRO-ESOPHAGEAL REFLUX DISEASE WITHOUT ESOPHAGITIS: ICD-10-CM

## 2024-04-04 DIAGNOSIS — K44.9 DIAPHRAGMATIC HERNIA WITHOUT OBSTRUCTION OR GANGRENE: ICD-10-CM

## 2024-04-04 DIAGNOSIS — K22.89 OTHER SPECIFIED DISEASE OF ESOPHAGUS: ICD-10-CM

## 2024-04-04 DIAGNOSIS — K21.00 GASTRO-ESOPHAGEAL REFLUX DISEASE WITH ESOPHAGITIS, WITHOUT B: ICD-10-CM

## 2024-04-04 PROCEDURE — 88342 IMHCHEM/IMCYTCHM 1ST ANTB: CPT | Mod: TC | Performed by: PATHOLOGY

## 2024-04-04 PROCEDURE — 88313 SPECIAL STAINS GROUP 2: CPT | Mod: TC | Performed by: PATHOLOGY

## 2024-04-04 PROCEDURE — 43239 EGD BIOPSY SINGLE/MULTIPLE: CPT | Performed by: INTERNAL MEDICINE

## 2024-04-04 PROCEDURE — 43450 DILATE ESOPHAGUS 1/MULT PASS: CPT | Performed by: INTERNAL MEDICINE

## 2024-04-04 PROCEDURE — 88305 TISSUE EXAM BY PATHOLOGIST: CPT | Mod: TC | Performed by: PATHOLOGY

## 2024-04-08 ENCOUNTER — INFUSION (OUTPATIENT)
Dept: HEMATOLOGY/ONCOLOGY | Facility: CLINIC | Age: 79
End: 2024-04-08
Payer: MEDICARE

## 2024-04-08 VITALS
SYSTOLIC BLOOD PRESSURE: 108 MMHG | OXYGEN SATURATION: 97 % | TEMPERATURE: 97.5 F | WEIGHT: 126.32 LBS | BODY MASS INDEX: 23.91 KG/M2 | DIASTOLIC BLOOD PRESSURE: 70 MMHG | RESPIRATION RATE: 16 BRPM | HEART RATE: 80 BPM

## 2024-04-08 DIAGNOSIS — Z17.1 MALIGNANT NEOPLASM OF LEFT BREAST IN FEMALE, ESTROGEN RECEPTOR NEGATIVE, UNSPECIFIED SITE OF BREAST (MULTI): ICD-10-CM

## 2024-04-08 DIAGNOSIS — C50.919 BREAST CANCER METASTASIZED TO MULTIPLE SITES, UNSPECIFIED LATERALITY (MULTI): ICD-10-CM

## 2024-04-08 DIAGNOSIS — C50.912 MALIGNANT NEOPLASM OF LEFT BREAST IN FEMALE, ESTROGEN RECEPTOR NEGATIVE, UNSPECIFIED SITE OF BREAST (MULTI): ICD-10-CM

## 2024-04-08 PROCEDURE — 96372 THER/PROPH/DIAG INJ SC/IM: CPT | Performed by: STUDENT IN AN ORGANIZED HEALTH CARE EDUCATION/TRAINING PROGRAM

## 2024-04-08 PROCEDURE — 96401 CHEMO ANTI-NEOPL SQ/IM: CPT

## 2024-04-08 PROCEDURE — 2500000004 HC RX 250 GENERAL PHARMACY W/ HCPCS (ALT 636 FOR OP/ED): Mod: JZ,JG | Performed by: STUDENT IN AN ORGANIZED HEALTH CARE EDUCATION/TRAINING PROGRAM

## 2024-04-08 RX ORDER — FAMOTIDINE 10 MG/ML
20 INJECTION INTRAVENOUS ONCE AS NEEDED
Status: DISCONTINUED | OUTPATIENT
Start: 2024-04-08 | End: 2024-04-08 | Stop reason: HOSPADM

## 2024-04-08 RX ORDER — ONDANSETRON 8 MG/1
TABLET, ORALLY DISINTEGRATING ORAL
COMMUNITY
Start: 2024-03-28

## 2024-04-08 RX ORDER — PROCHLORPERAZINE EDISYLATE 5 MG/ML
10 INJECTION INTRAMUSCULAR; INTRAVENOUS EVERY 6 HOURS PRN
Status: DISCONTINUED | OUTPATIENT
Start: 2024-04-08 | End: 2024-04-08 | Stop reason: HOSPADM

## 2024-04-08 RX ORDER — SUCRALFATE 1 G/1
1 TABLET ORAL 2 TIMES DAILY
COMMUNITY
Start: 2024-04-02

## 2024-04-08 RX ORDER — ALBUTEROL SULFATE 0.83 MG/ML
3 SOLUTION RESPIRATORY (INHALATION) AS NEEDED
Status: DISCONTINUED | OUTPATIENT
Start: 2024-04-08 | End: 2024-04-08 | Stop reason: HOSPADM

## 2024-04-08 RX ORDER — PROCHLORPERAZINE MALEATE 10 MG
10 TABLET ORAL EVERY 6 HOURS PRN
Status: DISCONTINUED | OUTPATIENT
Start: 2024-04-08 | End: 2024-04-08 | Stop reason: HOSPADM

## 2024-04-08 RX ORDER — DIPHENHYDRAMINE HYDROCHLORIDE 50 MG/ML
50 INJECTION INTRAMUSCULAR; INTRAVENOUS AS NEEDED
Status: DISCONTINUED | OUTPATIENT
Start: 2024-04-08 | End: 2024-04-08 | Stop reason: HOSPADM

## 2024-04-08 RX ORDER — EPINEPHRINE 0.3 MG/.3ML
0.3 INJECTION SUBCUTANEOUS EVERY 5 MIN PRN
Status: DISCONTINUED | OUTPATIENT
Start: 2024-04-08 | End: 2024-04-08 | Stop reason: HOSPADM

## 2024-04-08 RX ADMIN — PERTUZUMAB, TRASTUZUMAB, AND HYALURONIDASE-ZZXF 10 ML: 600; 600; 2000 INJECTION, SOLUTION SUBCUTANEOUS at 09:12

## 2024-04-08 ASSESSMENT — PAIN SCALES - GENERAL: PAINLEVEL: 0-NO PAIN

## 2024-04-08 NOTE — PROGRESS NOTES
Pertuzumab/trazstuzumab inj       Met with oncology cardiology on 4/3/24 next echo July 2024    Patient is here today for infusion - no complication since last being seen-  independant double check done prior to chemotherapy today-   b/h/ lung sounds not auscultated  patient verbalizes understanding of plan of care     Quan inj well right subcutaneous thigh

## 2024-04-16 NOTE — PROGRESS NOTES
History of Present Illness:  Linda C Hegarty is a 78 y.o. female with a personal history of breast cancer.  Linda C Hegarty was referred to the Cancer Genetics Clinic at [facility] by Blake Dye MD. Linda C Hegarty is interested in genetic testing to clarify their personal risk for cancer, as well as the risks to their family members.    Cancer Medical History:  Personal history of cancer? Yes   Type: Breast  Age at diagnosis: 67  Summary: Metastatic ER/TN negative, HER2 positive breast cancer diagnosed in 2014 (2012 originally, metastasis found in 2014). S/p THPx6 and has been on maintenance herceptin/perjeta (phesgo) since 2015.   She presented with  Paget Disease of the left breast s/p left breast mastectomy with sentinel lymph node biopsy on 3/14/12. The final pathology showed scattered ducts in the superolateral segment of the breast involved by high grade ductal carcinoma in situ with multifocal areas of microinvasive carcinoma. DCIS and microinvasive tumor approaches to within 1 mm of the superior lateral margin. The DCIS and microinvasive were ER and TN negative. She also had 0 of 3 lymph nodes which were negative for malignancy. She then had a re-excision which was negative for residual DCIS or microinvasive disease. She had no adjuvant therapy.     She did well until 2014 when she self palpated L neck nodes. A CT neck with contrast was performed on 7/30/14 which showed enlarged and abnormally enhancing left posterior cervical space lymph node near the left subclavicular space. Smaller left posterior cervical space lymph nodes are also noted. A CT chest with contrast was also performed on 7/30/14 which showed new left axillary and retroclavicular adenopathy suspicious for metastatic disease, a new subtle 5 mm focus  in the T12 vertebral body suspicious for early osseous mets and a new 2 to 3 mm lung nodule in the right lower lobe. She had a PET/CT on 8/7/14 which showed intensely  hypermetabolic lower cervical and thoracic adenopathy suspicious for recurrent/metastatic breast cancer. Also an intensely hypermetabolic subcarinal lymph node was seen with a max SUV of 8 suspicious for metastasis. She was seen by Dr. Fernando Paris on 14 and he performed an excisional biopsy of the left axillary lymph node as well as a left chest wall mass on .      The left axillary lymph node showed metastatic carcinoma with extracapsular extension which was ER <1%, AZ 0% and HER2 3+ by IHC.    A bronchoscopy was performed on 14 which showed malignant cells derived from adenocarcinoma, morphologically compatible with the patient's known history of breast cancer which were ER/AZ negative by IHC and HER2 positive (3+ by IHC)     Patient started first line systemic chemotherapy treatment with trastuzumab, pertuzumab, and docetaxel on 2014 and completed 6 cycles with a CR by imaging.     Herceptin/Perjeta monotherapy started 2015-current.    History of other cancers: No     Prior genetic testing? No     Cancer screening history:  Colonoscopy? Per patient, a total 10 with a cumulative polyp count of <10  Other cancer screening? No    Reproductive History:  1st period at age 15. 1st pregnancy at age 32. . Last menstrual period in      Hysterectomy? Yes   d/t h/o 43 fibroids and tubal pregnancy  Oophorectomy? Yes , d/t h/o 43 fibroids and tubal pregnancy    Other potential risk factors:  H/o smoking between ages [age]  No known exposure of FAISAL    Family history:  A 4-generation pedigree was obtained and was significant for the following:   Sister (living, 43) without cancer history  Two sisters. One passed at 57 without cancer history. The other is living at 74 with a h/o breast cancer diagnosed at 52 (no genetic testing)  Mother (living, 88) without cancer history  One maternal aunt (, 60s) who passed of colon cancer diagnosed in her 50s  Two maternal uncles (both , one in his  50s the other at 80) without cancer history  Maternal grandmother (, 84) with a h/o lymphoma (?)  Maternal grandfather (, 74) without cancer history  Father (, 89) without cancer history  Paternal aunt (, 80s) without cancer history  Paternal uncle (, 80s) with a h/o cancer (type unknown to us) diagnosed at an age >50  Paternal grandmother (, 70s) who passed of pancreatic cancer (no genetic testing)  Paternal grandfather (, late 60s) without cancer history  Maternal ancestry is Micronesian.  Paternal ancestry is English and Faroese. There is no known Ashkenazi Moravian ancestry. Consanguinity was denied.       Discussion:  Linda C Hegarty is a 78 y.o. old female with a personal and family history of cancer.  Based on her personal history of breast cancer and there being 3 close relatives on the same side of the family with breast or pancreatic cancer, Ms. Hegarty meets NCCN criteria for testing of the BRCA1 and BRCA2 genes to aid in systemic treatment decisions using PARP inhibitors.  She is interested in testing, which is recommended, and was ordered today via the 71-gene CancerNext-Expanded panel from [employer] Our discussion is summarized below.    We reviewed genes and chromosomes, inherited forms of breast and ovarian cancer, and the BRCA1 and BRCA2 genes causing HBOC. We discussed that most cancers are not due to an inherited genetic susceptibility. However, in about 5-10% of families, there is an inherited genetic mutation that can make a person more susceptible to developing certain forms of cancer. Within these families, we often see multiple family members with cancer, occurring in multiple generations. In addition, earlier onset and bilateral cancers are suggestive of an inherited form of cancer. Finally, there is a clustering of certain types of cancer in these families, such as breast and ovarian cancer.    We discussed the BRCA1 and BRCA2 genes,  which are two genes that have been linked to early-onset breast and/or ovarian cancer. Changes in these genes (sometimes referred to as mutations) are inherited in a dominant pattern and confer >60% lifetime risk for breast cancer. This is elevated compared to the general population risk of 13%. In addition, BRCA1 and BRCA2 mutation carriers have up to a 58% lifetime risk for ovarian cancer, which is elevated over the 1.3% general population risk. Mutation carriers who have already been diagnosed with cancer have an increased risk to develop a second, contralateral breast cancer. BRCA2 gene mutation carriers have an increased risk for male breast cancer, prostate cancer, melanoma and pancreatic cancer.    We discussed that there are multiple genes associated with increased breast cancer risk. Some genes, like the BRCA1 and BRCA2 genes, are considered highly penetrant breast cancer genes, meaning a mutation in the gene confers a high risk of breast cancer. Additionally, there are other intermediate (moderate risk) breast cancer genes. For some of the moderate risk genes, there is often limited information regarding the degree to which a mutation in the gene affects risk of different types of cancers. Additionally, for some of these moderate risk genes, the appropriate management for individuals who have a mutation in one of these genes is not always clear. Our knowledge about the cancer risks associated with mutations in these moderate risk genes is always growing, and we will likely be able to provide more comprehensive information in the future.     Gene mutations in most cancer risk genes, i.e. BRCA1 and BRCA2, are inherited in an autosomal dominant fashion. This means that if an individual has a change in either of these genes, their siblings, parents, and children have a 50% chance of also having that gene change and a 50% chance of not having the gene change.     We reviewed the three results we can get  back:  1. Positive- Identified a change in a cancer gene that confers an increased cancer risk. We will discuss potential changes in management for her and her family based on the specific gene mutation found.  2. Negative-Clears her for the cancer predisposition syndrome we assessed, but cannot clear her for all cancer predisposition risks. She would continue to be screened based on her personal and family history.  3. Variant of Uncertain Significance (VUS) - We discussed should an uncertain result come back that this would be treated like a negative result (i.e. no management recommendation will be made no familial variant testing) as the implications of this finding are currently unknown.    Lastly, we discussed the Genetic Information Non-discrimination Act (ENOC) of 2008. We discussed that per this federal law, employers (at companies with 15 employees or greater) and health insurance companies (barring [employer] and other  insurances) are forbidden to ask for and use genetic information against another person. As such, health insurance companies cannot ask for genetic information and use findings affect coverage or rates. However, luxury insurances such as life insurance, long term care insurance, and/or private disability insurance companies are not forbidden against using genetic information when an individual takes out a new/additional policy in one of those areas. As such, for unaffected individuals it could be beneficial to explore/take out policies in luxury insurance areas PRIOR to undergoing genetic testing.    Linda C Hegarty was counseled about hereditary cancer susceptibility including cancer risks, options for increased screening and/or risk reduction, genetic testing, and the implications for other family members. We discussed performing genetic testing in the context of a multi-gene panel test that looks at the BRCA1 and BRCA2, as well as moderate penetrant genes. She is interested in this  approach, which is recommended and was ordered today via the 71-gene CancerNext-Expanded panel from [facility].    Results are typically available within 2-3 weeks, and Linda C Hegarty will return to the Cancer Genetics Clinic to discuss her testing results. At that time, we will make recommendations for both Linda C Hegarty and her family members in terms of cancer screening and/or cancer risk reduction options.         PLAN:  1.  Linda C Hegarty elected to undergo genetic testing via a panel test that analyzes 71 genes associated with breast and other cancer risks. Consent for testing was verbalized and a plan made to collect a blood sample. The sample will be sent to [facility] for analysis. Results are typically available in 2-3 weeks.    2. Linda C Hegarty will return to the Cancer Genetics Clinic in approximately 2-3 weeks to discuss her test results.     3. We remain available to Linda C Hegarty or her family members at [phone] if any questions arise regarding information discussed at today's visit.    Karin Beach MS, [facility]  Certified Genetic Counselor  [place] [facility]  [phone]    Reviewed by:  Dr. Fiordaliza Benjamin  Clinical   [place] [facility]  [phone]

## 2024-04-24 ENCOUNTER — HOSPITAL ENCOUNTER (OUTPATIENT)
Dept: RADIOLOGY | Facility: CLINIC | Age: 79
Discharge: HOME | End: 2024-04-24
Payer: MEDICARE

## 2024-04-24 ENCOUNTER — HOSPITAL ENCOUNTER (OUTPATIENT)
Dept: RADIOLOGY | Facility: CLINIC | Age: 79
End: 2024-04-24
Payer: MEDICARE

## 2024-04-24 DIAGNOSIS — Z17.1 MALIGNANT NEOPLASM OF LEFT BREAST IN FEMALE, ESTROGEN RECEPTOR NEGATIVE, UNSPECIFIED SITE OF BREAST (MULTI): Primary | ICD-10-CM

## 2024-04-24 DIAGNOSIS — C50.919 BREAST CANCER METASTASIZED TO MULTIPLE SITES, UNSPECIFIED LATERALITY (MULTI): ICD-10-CM

## 2024-04-24 DIAGNOSIS — C50.912 MALIGNANT NEOPLASM OF LEFT BREAST IN FEMALE, ESTROGEN RECEPTOR NEGATIVE, UNSPECIFIED SITE OF BREAST (MULTI): Primary | ICD-10-CM

## 2024-04-24 PROCEDURE — A9503 TC99M MEDRONATE: HCPCS | Performed by: STUDENT IN AN ORGANIZED HEALTH CARE EDUCATION/TRAINING PROGRAM

## 2024-04-24 PROCEDURE — 78306 BONE IMAGING WHOLE BODY: CPT

## 2024-04-24 PROCEDURE — 3430000001 HC RX 343 DIAGNOSTIC RADIOPHARMACEUTICALS: Performed by: STUDENT IN AN ORGANIZED HEALTH CARE EDUCATION/TRAINING PROGRAM

## 2024-04-24 PROCEDURE — 78306 BONE IMAGING WHOLE BODY: CPT | Performed by: RADIOLOGY

## 2024-04-24 RX ORDER — ALBUTEROL SULFATE 0.83 MG/ML
3 SOLUTION RESPIRATORY (INHALATION) AS NEEDED
Status: CANCELLED | OUTPATIENT
Start: 2024-05-01

## 2024-04-24 RX ORDER — FAMOTIDINE 10 MG/ML
20 INJECTION INTRAVENOUS ONCE AS NEEDED
Status: CANCELLED | OUTPATIENT
Start: 2024-05-01

## 2024-04-24 RX ORDER — DIPHENHYDRAMINE HYDROCHLORIDE 50 MG/ML
50 INJECTION INTRAMUSCULAR; INTRAVENOUS AS NEEDED
Status: CANCELLED | OUTPATIENT
Start: 2024-05-01

## 2024-04-24 RX ORDER — EPINEPHRINE 0.3 MG/.3ML
0.3 INJECTION SUBCUTANEOUS EVERY 5 MIN PRN
Status: CANCELLED | OUTPATIENT
Start: 2024-05-01

## 2024-04-24 RX ORDER — FAMOTIDINE 10 MG/ML
20 INJECTION INTRAVENOUS ONCE AS NEEDED
Status: CANCELLED | OUTPATIENT
Start: 2024-05-22

## 2024-04-24 RX ORDER — EPINEPHRINE 0.3 MG/.3ML
0.3 INJECTION SUBCUTANEOUS EVERY 5 MIN PRN
Status: CANCELLED | OUTPATIENT
Start: 2024-05-22

## 2024-04-24 RX ORDER — PROCHLORPERAZINE EDISYLATE 5 MG/ML
10 INJECTION INTRAMUSCULAR; INTRAVENOUS EVERY 6 HOURS PRN
Status: CANCELLED | OUTPATIENT
Start: 2024-05-01

## 2024-04-24 RX ORDER — PROCHLORPERAZINE MALEATE 10 MG
10 TABLET ORAL EVERY 6 HOURS PRN
Status: CANCELLED | OUTPATIENT
Start: 2024-05-22

## 2024-04-24 RX ORDER — PROCHLORPERAZINE MALEATE 10 MG
10 TABLET ORAL EVERY 6 HOURS PRN
Status: CANCELLED | OUTPATIENT
Start: 2024-05-01

## 2024-04-24 RX ORDER — PROCHLORPERAZINE EDISYLATE 5 MG/ML
10 INJECTION INTRAMUSCULAR; INTRAVENOUS EVERY 6 HOURS PRN
Status: CANCELLED | OUTPATIENT
Start: 2024-05-22

## 2024-04-24 RX ORDER — DIPHENHYDRAMINE HYDROCHLORIDE 50 MG/ML
50 INJECTION INTRAMUSCULAR; INTRAVENOUS AS NEEDED
Status: CANCELLED | OUTPATIENT
Start: 2024-05-22

## 2024-04-24 RX ORDER — ALBUTEROL SULFATE 0.83 MG/ML
3 SOLUTION RESPIRATORY (INHALATION) AS NEEDED
Status: CANCELLED | OUTPATIENT
Start: 2024-05-22

## 2024-04-24 RX ADMIN — TECHNETIUM TC 99M MEDRONATE 25.5 MILLICURIE: 25 INJECTION, POWDER, FOR SOLUTION INTRAVENOUS at 10:15

## 2024-04-25 ENCOUNTER — TELEMEDICINE CLINICAL SUPPORT (OUTPATIENT)
Dept: GENETICS | Facility: CLINIC | Age: 79
End: 2024-04-25
Payer: MEDICARE

## 2024-04-25 DIAGNOSIS — C50.919 BREAST CANCER METASTASIZED TO MULTIPLE SITES, UNSPECIFIED LATERALITY (MULTI): Primary | ICD-10-CM

## 2024-04-25 PROCEDURE — GENMD PR GENETICS VISIT (MEDICAID/MEDICARE)

## 2024-04-29 ENCOUNTER — APPOINTMENT (OUTPATIENT)
Dept: HEMATOLOGY/ONCOLOGY | Facility: CLINIC | Age: 79
End: 2024-04-29
Payer: MEDICARE

## 2024-05-01 ENCOUNTER — INFUSION (OUTPATIENT)
Dept: HEMATOLOGY/ONCOLOGY | Facility: CLINIC | Age: 79
End: 2024-05-01
Payer: MEDICARE

## 2024-05-01 ENCOUNTER — LAB (OUTPATIENT)
Dept: LAB | Facility: CLINIC | Age: 79
End: 2024-05-01
Payer: MEDICARE

## 2024-05-01 VITALS
BODY MASS INDEX: 23.64 KG/M2 | TEMPERATURE: 96.8 F | RESPIRATION RATE: 14 BRPM | SYSTOLIC BLOOD PRESSURE: 150 MMHG | WEIGHT: 124.89 LBS | OXYGEN SATURATION: 98 % | DIASTOLIC BLOOD PRESSURE: 92 MMHG | HEART RATE: 78 BPM

## 2024-05-01 DIAGNOSIS — C50.919 BREAST CANCER METASTASIZED TO MULTIPLE SITES, UNSPECIFIED LATERALITY (MULTI): ICD-10-CM

## 2024-05-01 DIAGNOSIS — I42.0 DILATED CARDIOMYOPATHY (MULTI): ICD-10-CM

## 2024-05-01 DIAGNOSIS — Z17.1 MALIGNANT NEOPLASM OF LEFT BREAST IN FEMALE, ESTROGEN RECEPTOR NEGATIVE, UNSPECIFIED SITE OF BREAST (MULTI): ICD-10-CM

## 2024-05-01 DIAGNOSIS — C50.912 MALIGNANT NEOPLASM OF LEFT BREAST IN FEMALE, ESTROGEN RECEPTOR NEGATIVE, UNSPECIFIED SITE OF BREAST (MULTI): ICD-10-CM

## 2024-05-01 DIAGNOSIS — I42.7 CARDIOTOXICITY (MULTI): ICD-10-CM

## 2024-05-01 PROCEDURE — 36415 COLL VENOUS BLD VENIPUNCTURE: CPT

## 2024-05-01 PROCEDURE — 96401 CHEMO ANTI-NEOPL SQ/IM: CPT

## 2024-05-01 PROCEDURE — 2500000004 HC RX 250 GENERAL PHARMACY W/ HCPCS (ALT 636 FOR OP/ED): Mod: JZ,JG | Performed by: STUDENT IN AN ORGANIZED HEALTH CARE EDUCATION/TRAINING PROGRAM

## 2024-05-01 PROCEDURE — 83880 ASSAY OF NATRIURETIC PEPTIDE: CPT

## 2024-05-01 RX ORDER — EPINEPHRINE 0.3 MG/.3ML
0.3 INJECTION SUBCUTANEOUS EVERY 5 MIN PRN
Status: DISCONTINUED | OUTPATIENT
Start: 2024-05-01 | End: 2024-05-01 | Stop reason: HOSPADM

## 2024-05-01 RX ORDER — FAMOTIDINE 10 MG/ML
20 INJECTION INTRAVENOUS ONCE AS NEEDED
Status: DISCONTINUED | OUTPATIENT
Start: 2024-05-01 | End: 2024-05-01 | Stop reason: HOSPADM

## 2024-05-01 RX ORDER — PROCHLORPERAZINE MALEATE 10 MG
10 TABLET ORAL EVERY 6 HOURS PRN
Status: DISCONTINUED | OUTPATIENT
Start: 2024-05-01 | End: 2024-05-01 | Stop reason: HOSPADM

## 2024-05-01 RX ORDER — DIPHENHYDRAMINE HYDROCHLORIDE 50 MG/ML
50 INJECTION INTRAMUSCULAR; INTRAVENOUS AS NEEDED
Status: DISCONTINUED | OUTPATIENT
Start: 2024-05-01 | End: 2024-05-01 | Stop reason: HOSPADM

## 2024-05-01 RX ORDER — PROCHLORPERAZINE EDISYLATE 5 MG/ML
10 INJECTION INTRAMUSCULAR; INTRAVENOUS EVERY 6 HOURS PRN
Status: DISCONTINUED | OUTPATIENT
Start: 2024-05-01 | End: 2024-05-01 | Stop reason: HOSPADM

## 2024-05-01 RX ORDER — ALBUTEROL SULFATE 0.83 MG/ML
3 SOLUTION RESPIRATORY (INHALATION) AS NEEDED
Status: DISCONTINUED | OUTPATIENT
Start: 2024-05-01 | End: 2024-05-01 | Stop reason: HOSPADM

## 2024-05-01 RX ADMIN — PERTUZUMAB, TRASTUZUMAB, AND HYALURONIDASE-ZZXF 10 ML: 600; 600; 2000 INJECTION, SOLUTION SUBCUTANEOUS at 12:20

## 2024-05-01 ASSESSMENT — PAIN SCALES - GENERAL: PAINLEVEL: 0-NO PAIN

## 2024-05-01 NOTE — PROGRESS NOTES
Pt here for Phesgo injection. Tolerated well. Verbalized understanding of call back instructions. Aware of next appointment. Independently ambulatory off unit with steady gait.

## 2024-05-01 NOTE — PROGRESS NOTES
Agree with DILAN Keane RN notes.  Patient states numbness and tingling in fingers seems to be progressively worsening.  States yesterday she had difficulty buttoning clothes and writing.  States better today.  States wonders if cold be weather related. Dr. Dye aware and okay to continue today.  Patient states she seems this provider on Monday and will discuss with her then.  Patient tolerated injection well.  Aware of next appt and that next treatment appt  will be scheduled after seeing provider on Monday.  Patient independently ambulatory off unit in NAD and without complaints.  Gait steady.  Call back instructions reviewed.  Patient verbalized understanding.

## 2024-05-02 LAB — BNP SERPL-MCNC: 30 PG/ML (ref 0–99)

## 2024-05-06 ENCOUNTER — OFFICE VISIT (OUTPATIENT)
Dept: HEMATOLOGY/ONCOLOGY | Facility: CLINIC | Age: 79
End: 2024-05-06
Payer: MEDICARE

## 2024-05-06 VITALS
TEMPERATURE: 97.2 F | HEART RATE: 81 BPM | DIASTOLIC BLOOD PRESSURE: 72 MMHG | RESPIRATION RATE: 16 BRPM | WEIGHT: 125.88 LBS | SYSTOLIC BLOOD PRESSURE: 109 MMHG | OXYGEN SATURATION: 97 % | BODY MASS INDEX: 23.83 KG/M2

## 2024-05-06 DIAGNOSIS — C50.919 BREAST CANCER METASTASIZED TO MULTIPLE SITES, UNSPECIFIED LATERALITY (MULTI): ICD-10-CM

## 2024-05-06 DIAGNOSIS — Z17.1 MALIGNANT NEOPLASM OF LEFT BREAST IN FEMALE, ESTROGEN RECEPTOR NEGATIVE, UNSPECIFIED SITE OF BREAST (MULTI): Primary | ICD-10-CM

## 2024-05-06 DIAGNOSIS — C50.912 MALIGNANT NEOPLASM OF LEFT BREAST IN FEMALE, ESTROGEN RECEPTOR NEGATIVE, UNSPECIFIED SITE OF BREAST (MULTI): Primary | ICD-10-CM

## 2024-05-06 PROCEDURE — 3074F SYST BP LT 130 MM HG: CPT | Performed by: STUDENT IN AN ORGANIZED HEALTH CARE EDUCATION/TRAINING PROGRAM

## 2024-05-06 PROCEDURE — 3078F DIAST BP <80 MM HG: CPT | Performed by: STUDENT IN AN ORGANIZED HEALTH CARE EDUCATION/TRAINING PROGRAM

## 2024-05-06 PROCEDURE — 99215 OFFICE O/P EST HI 40 MIN: CPT | Performed by: STUDENT IN AN ORGANIZED HEALTH CARE EDUCATION/TRAINING PROGRAM

## 2024-05-06 PROCEDURE — 1126F AMNT PAIN NOTED NONE PRSNT: CPT | Performed by: STUDENT IN AN ORGANIZED HEALTH CARE EDUCATION/TRAINING PROGRAM

## 2024-05-06 PROCEDURE — 1159F MED LIST DOCD IN RCRD: CPT | Performed by: STUDENT IN AN ORGANIZED HEALTH CARE EDUCATION/TRAINING PROGRAM

## 2024-05-06 RX ORDER — PROCHLORPERAZINE MALEATE 10 MG
10 TABLET ORAL EVERY 6 HOURS PRN
OUTPATIENT
Start: 2024-07-24

## 2024-05-06 RX ORDER — PROCHLORPERAZINE EDISYLATE 5 MG/ML
10 INJECTION INTRAMUSCULAR; INTRAVENOUS EVERY 6 HOURS PRN
OUTPATIENT
Start: 2024-07-24

## 2024-05-06 RX ORDER — EPINEPHRINE 0.3 MG/.3ML
0.3 INJECTION SUBCUTANEOUS EVERY 5 MIN PRN
OUTPATIENT
Start: 2024-07-24

## 2024-05-06 RX ORDER — PROCHLORPERAZINE MALEATE 10 MG
10 TABLET ORAL EVERY 6 HOURS PRN
OUTPATIENT
Start: 2024-06-12

## 2024-05-06 RX ORDER — EPINEPHRINE 0.3 MG/.3ML
0.3 INJECTION SUBCUTANEOUS EVERY 5 MIN PRN
OUTPATIENT
Start: 2024-06-12

## 2024-05-06 RX ORDER — FAMOTIDINE 10 MG/ML
20 INJECTION INTRAVENOUS ONCE AS NEEDED
OUTPATIENT
Start: 2024-08-14

## 2024-05-06 RX ORDER — FAMOTIDINE 10 MG/ML
20 INJECTION INTRAVENOUS ONCE AS NEEDED
OUTPATIENT
Start: 2024-06-12

## 2024-05-06 RX ORDER — PROCHLORPERAZINE EDISYLATE 5 MG/ML
10 INJECTION INTRAMUSCULAR; INTRAVENOUS EVERY 6 HOURS PRN
OUTPATIENT
Start: 2024-07-03

## 2024-05-06 RX ORDER — ALBUTEROL SULFATE 0.83 MG/ML
3 SOLUTION RESPIRATORY (INHALATION) AS NEEDED
OUTPATIENT
Start: 2024-08-14

## 2024-05-06 RX ORDER — DIPHENHYDRAMINE HYDROCHLORIDE 50 MG/ML
50 INJECTION INTRAMUSCULAR; INTRAVENOUS AS NEEDED
OUTPATIENT
Start: 2024-07-03

## 2024-05-06 RX ORDER — ALBUTEROL SULFATE 0.83 MG/ML
3 SOLUTION RESPIRATORY (INHALATION) AS NEEDED
OUTPATIENT
Start: 2024-06-12

## 2024-05-06 RX ORDER — PROCHLORPERAZINE MALEATE 10 MG
10 TABLET ORAL EVERY 6 HOURS PRN
OUTPATIENT
Start: 2024-08-14

## 2024-05-06 RX ORDER — EPINEPHRINE 0.3 MG/.3ML
0.3 INJECTION SUBCUTANEOUS EVERY 5 MIN PRN
OUTPATIENT
Start: 2024-08-14

## 2024-05-06 RX ORDER — DIPHENHYDRAMINE HYDROCHLORIDE 50 MG/ML
50 INJECTION INTRAMUSCULAR; INTRAVENOUS AS NEEDED
OUTPATIENT
Start: 2024-07-24

## 2024-05-06 RX ORDER — PROCHLORPERAZINE MALEATE 10 MG
10 TABLET ORAL EVERY 6 HOURS PRN
OUTPATIENT
Start: 2024-07-03

## 2024-05-06 RX ORDER — FAMOTIDINE 10 MG/ML
20 INJECTION INTRAVENOUS ONCE AS NEEDED
OUTPATIENT
Start: 2024-07-03

## 2024-05-06 RX ORDER — FAMOTIDINE 10 MG/ML
20 INJECTION INTRAVENOUS ONCE AS NEEDED
OUTPATIENT
Start: 2024-07-24

## 2024-05-06 RX ORDER — ALBUTEROL SULFATE 0.83 MG/ML
3 SOLUTION RESPIRATORY (INHALATION) AS NEEDED
OUTPATIENT
Start: 2024-07-03

## 2024-05-06 RX ORDER — PROCHLORPERAZINE EDISYLATE 5 MG/ML
10 INJECTION INTRAMUSCULAR; INTRAVENOUS EVERY 6 HOURS PRN
OUTPATIENT
Start: 2024-06-12

## 2024-05-06 RX ORDER — DIPHENHYDRAMINE HYDROCHLORIDE 50 MG/ML
50 INJECTION INTRAMUSCULAR; INTRAVENOUS AS NEEDED
OUTPATIENT
Start: 2024-08-14

## 2024-05-06 RX ORDER — ALBUTEROL SULFATE 0.83 MG/ML
3 SOLUTION RESPIRATORY (INHALATION) AS NEEDED
OUTPATIENT
Start: 2024-07-24

## 2024-05-06 RX ORDER — DIPHENHYDRAMINE HYDROCHLORIDE 50 MG/ML
50 INJECTION INTRAMUSCULAR; INTRAVENOUS AS NEEDED
OUTPATIENT
Start: 2024-06-12

## 2024-05-06 RX ORDER — PROCHLORPERAZINE EDISYLATE 5 MG/ML
10 INJECTION INTRAMUSCULAR; INTRAVENOUS EVERY 6 HOURS PRN
OUTPATIENT
Start: 2024-08-14

## 2024-05-06 RX ORDER — EPINEPHRINE 0.3 MG/.3ML
0.3 INJECTION SUBCUTANEOUS EVERY 5 MIN PRN
OUTPATIENT
Start: 2024-07-03

## 2024-05-06 ASSESSMENT — PAIN SCALES - GENERAL: PAINLEVEL: 0-NO PAIN

## 2024-05-06 ASSESSMENT — ENCOUNTER SYMPTOMS
FEVER: 0
UNEXPECTED WEIGHT CHANGE: 0
ABDOMINAL PAIN: 0
DIZZINESS: 0
HEMATURIA: 0
HOT FLASHES: 0
DIARRHEA: 0
CONFUSION: 0
DYSURIA: 0
VOMITING: 0
LEG SWELLING: 0
NAUSEA: 0
HEADACHES: 0
BACK PAIN: 0
CONSTIPATION: 0
NUMBNESS: 0
COUGH: 0
SHORTNESS OF BREATH: 0
CHILLS: 0
ARTHRALGIAS: 0
ADENOPATHY: 0
APPETITE CHANGE: 0
FATIGUE: 0
DIFFICULTY URINATING: 0

## 2024-05-06 NOTE — PROGRESS NOTES
Breast Medical Oncology Clinic  Location: [place]    Visit Type: Follow-up Visit    Oncology History:  She presented with  Paget Disease of the left breast s/p left breast mastectomy with sentinel lymph node biopsy on 3/14/12. The final pathology showed scattered ducts in the superolateral segment of the breast involved by high grade ductal carcinoma in situ with multifocal areas of microinvasive carcinoma. DCIS and microinvasive tumor approaches to within 1 mm of the superior lateral margin. The DCIS and microinvasive were ER and NH negative. She also had 0 of 3 lymph nodes which were negative for malignancy. She then had a re-excision which was negative for residual DCIS or microinvasive disease. She had no adjuvant therapy.    She did well until 2014 when she self palpated L neck nodes. A CT neck with contrast was performed on 7/30/14 which showed enlarged and abnormally enhancing left posterior cervical space lymph node near the left subclavicular space. Smaller left posterior cervical space lymph nodes are also noted. A CT chest with contrast was also performed on 7/30/14 which showed new left axillary and retroclavicular adenopathy suspicious for metastatic disease, a new subtle 5 mm focus  in the T12 vertebral body suspicious for early osseous mets and a new 2 to 3 mm lung nodule in the right lower lobe. She had a PET/CT on 8/7/14 which showed intensely hypermetabolic lower cervical and thoracic adenopathy suspicious for recurrent/metastatic breast cancer. Also an intensely hypermetabolic subcarinal lymph node was seen with a max SUV of 8 suspicious for metastasis. She was seen by Dr. Fernando Paris on 8/11/14 and he performed an excisional biopsy of the left axillary lymph node as well as a left chest wall mass on 8/14.     The left axillary lymph node showed metastatic carcinoma with extracapsular extension which was ER <1%, NH 0% and HER2 3+ by IHC.    A bronchoscopy was performed on 9/2/14 which  showed malignant cells derived from adenocarcinoma, morphologically compatible with the patient's known history of breast cancer which were ER/DC negative by IHC and HER2 positive (3+ by IHC)     Patient started first line systemic chemotherapy treatment with trastuzumab, pertuzumab, and docetaxel on 2014 and completed 6 cycles with a CR by imaging.    Herceptin/Perjeta monotherapy started 2015    GYN History/Pertinent Family history::  Sister diagnosed with breast cancer at age 52 who underwent genetic testing and was negative for BRCA1/BRCA2 per patient.     Maternal aunt with colon cancer diagnosed in her 60s.     Maternal grandmother diagnosed with lymphoma and paternal grandmother diagnosed with liver cancer.    Gyn History: 1st period at age 15. 1st pregnancy at age 32. . Last menstrual period in        Subjective: Interval History    Presents for follow-up visit with her . Has been traveling a lot to [place] and [place] to see her grandchildren.     Denies interval events since last follow-up- no recent hospitalizations or new medical diagnoses.     Started on Jardiance 10 mg  by cardiologist.     Otherwise, denies weight loss, changes in the breast and/or chest wall, new aches or pains, changes in appetite or energy level.     ROS:     Review of Systems   Constitutional:  Negative for appetite change, chills, fatigue, fever and unexpected weight change.   HENT:   Negative for mouth sores.    Respiratory:  Negative for cough and shortness of breath.    Cardiovascular:  Negative for chest pain and leg swelling.   Gastrointestinal:  Negative for abdominal pain, constipation, diarrhea, nausea and vomiting.   Endocrine: Negative for hot flashes.   Genitourinary:  Negative for difficulty urinating, dysuria and hematuria.    Musculoskeletal:  Negative for arthralgias and back pain.   Skin:  Negative for rash.   Neurological:  Negative for dizziness, headaches and numbness.   Hematological:   Negative for adenopathy.   Psychiatric/Behavioral:  Negative for confusion.         Physical Examination:    /72   Pulse 81   Temp 36.2 °C (97.2 °F)   Resp 16   Wt 57.1 kg (125 lb 14.1 oz)   SpO2 97%   BMI 23.83 kg/m²     Physical Exam  Vitals and nursing note reviewed.   Constitutional:       General: She is not in acute distress.     Appearance: Normal appearance. She is not toxic-appearing.   HENT:      Head: Normocephalic and atraumatic.      Mouth/Throat:      Pharynx: Oropharynx is clear.   Eyes:      Extraocular Movements: Extraocular movements intact.      Conjunctiva/sclera: Conjunctivae normal.   Cardiovascular:      Rate and Rhythm: Normal rate and regular rhythm.   Pulmonary:      Effort: Pulmonary effort is normal. No respiratory distress.   Abdominal:      General: Abdomen is flat. Bowel sounds are normal.      Palpations: Abdomen is soft.   Musculoskeletal:         General: No swelling. Normal range of motion.      Cervical back: Normal range of motion.   Skin:     General: Skin is warm and dry.   Neurological:      General: No focal deficit present.      Mental Status: She is alert.   Psychiatric:         Mood and Affect: Mood normal.       Breast Examination: deferred    L mastectomy, chest wall unremarkable.  R breast without masses, skin or nipple changes.         ECOG Performance Status:     [x] 0 Fully active, able to carry on all pre-disease performance without restriction  [] 1 Restricted in physically strenuous activity but ambulatory and able to carry out work of a light or sedentary nature, e.g., light house work, office work  [] 2 Ambulatory and capable of all selfcare but unable to carry out any work activities; up and about more than 50% of waking hours  [] 3 Capable of only limited selfcare; confined to bed or chair more than 50% of waking hours  [] 4 Completely disabled; cannot carry on any selfcare; totally confined to bed or chair  [] 5 Dead     Labs:  None since last  visit.     Imagin/24/24: NM bone scan: No evidence of new osseous metastasis    4/3/24: ECHO- 45-50% LV    Pathology:    None since last visit.     Assessment:     Metastatic ER/IA negative, HER2 positive breast cancer diagnosed in . S/p THPx6 and has been on maintenance herceptin/perjeta (phesgo) since .     Tolerating phesgo; re-staging scans stable. Following EF closely with help from onco-cardiology. Will continue treatment for now. Discussed possibility of treatment break in the future if patient wishes.     Plan:    Surgical Plan: S/p L mastectomy and SLNBx in   Additional biopsy: Not indicated  Radiation Plan: Not indicated  Additional staging scans/DEXA/echo: Recent staging scans reviewed. She will require echocardiogram every 3 months while receiving HER2 directed therapy managed by onco-cardiology.  Additional Path info (i.e Ki67, PDL1): N/A  Gene assays: Not indicated    Systemic treatment plan: Remains on maintenance herceptin/perjeta since , now on phesgo     Intent: Palliative/disease control   Clinical trial: N/A   Endocrine therapy: Not indicated   HER2 treatment: Remains on maintenance herceptin/perjeta since , now on phesgo   Targeted agents: Not indicated   Chemotherapy: Received Docetaxel/Herceptin/Perjeta X6 in    BMA: N/A    Access: Removed  Supportive meds: N/A  Genetic testing: Information given to patient. She has not had genetic testing, sister was negative. Referral in place.   Fertility preservation: Not indicated  Other active problems/orders:     Osteopenia: 2021 DEXA. We discussed general recommendations for bone loss prevention which include 9436-8486 mg of calcium intake per day for adults >50yrs, and no history of renal calculi; 800-1000 IU of vitamin D3 per day for adults >50yrs, and no history of renal calculi. Weight bearing exercise. Discontinue smoking. Avoid excessive use of caffeine, soft drinks, and alcoholic beverages. In addition, balance  training and fall prevention programs can help reduce the risk of fractures. As she is not on endocrine therapy and has no bone metastasis, bone density will be followed by PCP.     Enlarged AAA: She follows with cardiology    Surveillance plan: Patient has been alternating NM bone scan and CT CAP every 3 months. Will order CT CAP in 3 months    Follow-up: 3 months for scan review.     Patient expressed understanding of the plan outlined above. She had ample time to ask questions. She understands she can contact us should she have additional questions or issues arise in the interim.

## 2024-05-16 LAB — SCAN RESULT: NORMAL

## 2024-05-17 ENCOUNTER — TELEPHONE (OUTPATIENT)
Dept: GENETICS | Facility: CLINIC | Age: 79
End: 2024-05-17
Payer: MEDICARE

## 2024-05-21 ENCOUNTER — TELEPHONE (OUTPATIENT)
Dept: HEMATOLOGY/ONCOLOGY | Facility: CLINIC | Age: 79
End: 2024-05-21
Payer: MEDICARE

## 2024-05-21 DIAGNOSIS — C50.912 MALIGNANT NEOPLASM OF LEFT BREAST IN FEMALE, ESTROGEN RECEPTOR NEGATIVE, UNSPECIFIED SITE OF BREAST (MULTI): Primary | ICD-10-CM

## 2024-05-21 DIAGNOSIS — Z17.1 MALIGNANT NEOPLASM OF LEFT BREAST IN FEMALE, ESTROGEN RECEPTOR NEGATIVE, UNSPECIFIED SITE OF BREAST (MULTI): Primary | ICD-10-CM

## 2024-05-22 ENCOUNTER — INFUSION (OUTPATIENT)
Dept: HEMATOLOGY/ONCOLOGY | Facility: CLINIC | Age: 79
End: 2024-05-22
Payer: MEDICARE

## 2024-05-22 VITALS
HEART RATE: 91 BPM | WEIGHT: 124.23 LBS | SYSTOLIC BLOOD PRESSURE: 150 MMHG | BODY MASS INDEX: 23.52 KG/M2 | RESPIRATION RATE: 14 BRPM | OXYGEN SATURATION: 97 % | TEMPERATURE: 97.3 F | DIASTOLIC BLOOD PRESSURE: 87 MMHG

## 2024-05-22 DIAGNOSIS — C50.912 MALIGNANT NEOPLASM OF LEFT BREAST IN FEMALE, ESTROGEN RECEPTOR NEGATIVE, UNSPECIFIED SITE OF BREAST (MULTI): ICD-10-CM

## 2024-05-22 DIAGNOSIS — Z17.1 MALIGNANT NEOPLASM OF LEFT BREAST IN FEMALE, ESTROGEN RECEPTOR NEGATIVE, UNSPECIFIED SITE OF BREAST (MULTI): ICD-10-CM

## 2024-05-22 PROCEDURE — 96401 CHEMO ANTI-NEOPL SQ/IM: CPT

## 2024-05-22 PROCEDURE — 2500000004 HC RX 250 GENERAL PHARMACY W/ HCPCS (ALT 636 FOR OP/ED): Mod: JZ,JG | Performed by: STUDENT IN AN ORGANIZED HEALTH CARE EDUCATION/TRAINING PROGRAM

## 2024-05-22 RX ORDER — ALBUTEROL SULFATE 0.83 MG/ML
3 SOLUTION RESPIRATORY (INHALATION) AS NEEDED
Status: DISCONTINUED | OUTPATIENT
Start: 2024-05-22 | End: 2024-05-22 | Stop reason: HOSPADM

## 2024-05-22 RX ORDER — DIPHENHYDRAMINE HYDROCHLORIDE 50 MG/ML
50 INJECTION INTRAMUSCULAR; INTRAVENOUS AS NEEDED
Status: DISCONTINUED | OUTPATIENT
Start: 2024-05-22 | End: 2024-05-22 | Stop reason: HOSPADM

## 2024-05-22 RX ORDER — FAMOTIDINE 10 MG/ML
20 INJECTION INTRAVENOUS ONCE AS NEEDED
Status: DISCONTINUED | OUTPATIENT
Start: 2024-05-22 | End: 2024-05-22 | Stop reason: HOSPADM

## 2024-05-22 RX ORDER — EPINEPHRINE 0.3 MG/.3ML
0.3 INJECTION SUBCUTANEOUS EVERY 5 MIN PRN
Status: DISCONTINUED | OUTPATIENT
Start: 2024-05-22 | End: 2024-05-22 | Stop reason: HOSPADM

## 2024-05-22 RX ORDER — PROCHLORPERAZINE EDISYLATE 5 MG/ML
10 INJECTION INTRAMUSCULAR; INTRAVENOUS EVERY 6 HOURS PRN
Status: DISCONTINUED | OUTPATIENT
Start: 2024-05-22 | End: 2024-05-22 | Stop reason: HOSPADM

## 2024-05-22 RX ORDER — PROCHLORPERAZINE MALEATE 10 MG
10 TABLET ORAL EVERY 6 HOURS PRN
Status: DISCONTINUED | OUTPATIENT
Start: 2024-05-22 | End: 2024-05-22 | Stop reason: HOSPADM

## 2024-05-22 RX ADMIN — PERTUZUMAB, TRASTUZUMAB, AND HYALURONIDASE-ZZXF 10 ML: 600; 600; 2000 INJECTION, SOLUTION SUBCUTANEOUS at 12:23

## 2024-05-22 ASSESSMENT — PAIN SCALES - GENERAL: PAINLEVEL: 0-NO PAIN

## 2024-05-22 NOTE — SIGNIFICANT EVENT

## 2024-06-12 ENCOUNTER — INFUSION (OUTPATIENT)
Dept: HEMATOLOGY/ONCOLOGY | Facility: CLINIC | Age: 79
End: 2024-06-12
Payer: MEDICARE

## 2024-06-12 VITALS
SYSTOLIC BLOOD PRESSURE: 115 MMHG | RESPIRATION RATE: 14 BRPM | TEMPERATURE: 98.1 F | OXYGEN SATURATION: 97 % | HEIGHT: 61 IN | HEART RATE: 78 BPM | BODY MASS INDEX: 23.56 KG/M2 | DIASTOLIC BLOOD PRESSURE: 72 MMHG | WEIGHT: 124.78 LBS

## 2024-06-12 DIAGNOSIS — C50.912 MALIGNANT NEOPLASM OF LEFT BREAST IN FEMALE, ESTROGEN RECEPTOR NEGATIVE, UNSPECIFIED SITE OF BREAST (MULTI): ICD-10-CM

## 2024-06-12 DIAGNOSIS — Z17.1 MALIGNANT NEOPLASM OF LEFT BREAST IN FEMALE, ESTROGEN RECEPTOR NEGATIVE, UNSPECIFIED SITE OF BREAST (MULTI): ICD-10-CM

## 2024-06-12 PROCEDURE — 96401 CHEMO ANTI-NEOPL SQ/IM: CPT

## 2024-06-12 PROCEDURE — 2500000004 HC RX 250 GENERAL PHARMACY W/ HCPCS (ALT 636 FOR OP/ED): Mod: JZ,JG | Performed by: STUDENT IN AN ORGANIZED HEALTH CARE EDUCATION/TRAINING PROGRAM

## 2024-06-12 RX ORDER — PROCHLORPERAZINE MALEATE 10 MG
10 TABLET ORAL EVERY 6 HOURS PRN
Status: DISCONTINUED | OUTPATIENT
Start: 2024-06-12 | End: 2024-06-12 | Stop reason: HOSPADM

## 2024-06-12 RX ORDER — ALBUTEROL SULFATE 0.83 MG/ML
3 SOLUTION RESPIRATORY (INHALATION) AS NEEDED
Status: DISCONTINUED | OUTPATIENT
Start: 2024-06-12 | End: 2024-06-12 | Stop reason: HOSPADM

## 2024-06-12 RX ORDER — DIPHENHYDRAMINE HYDROCHLORIDE 50 MG/ML
50 INJECTION INTRAMUSCULAR; INTRAVENOUS AS NEEDED
Status: DISCONTINUED | OUTPATIENT
Start: 2024-06-12 | End: 2024-06-12 | Stop reason: HOSPADM

## 2024-06-12 RX ORDER — EPINEPHRINE 0.3 MG/.3ML
0.3 INJECTION SUBCUTANEOUS EVERY 5 MIN PRN
Status: DISCONTINUED | OUTPATIENT
Start: 2024-06-12 | End: 2024-06-12 | Stop reason: HOSPADM

## 2024-06-12 RX ORDER — FAMOTIDINE 10 MG/ML
20 INJECTION INTRAVENOUS ONCE AS NEEDED
Status: DISCONTINUED | OUTPATIENT
Start: 2024-06-12 | End: 2024-06-12 | Stop reason: HOSPADM

## 2024-06-12 RX ORDER — PROCHLORPERAZINE EDISYLATE 5 MG/ML
10 INJECTION INTRAMUSCULAR; INTRAVENOUS EVERY 6 HOURS PRN
Status: DISCONTINUED | OUTPATIENT
Start: 2024-06-12 | End: 2024-06-12 | Stop reason: HOSPADM

## 2024-06-12 ASSESSMENT — PAIN SCALES - GENERAL: PAINLEVEL: 3

## 2024-06-26 ENCOUNTER — TELEPHONE (OUTPATIENT)
Dept: GENETICS | Facility: CLINIC | Age: 79
End: 2024-06-26
Payer: MEDICARE

## 2024-07-03 ENCOUNTER — INFUSION (OUTPATIENT)
Dept: HEMATOLOGY/ONCOLOGY | Facility: CLINIC | Age: 79
End: 2024-07-03
Payer: MEDICARE

## 2024-07-03 ENCOUNTER — APPOINTMENT (OUTPATIENT)
Dept: CARDIOLOGY | Facility: CLINIC | Age: 79
End: 2024-07-03
Payer: MEDICARE

## 2024-07-03 VITALS
BODY MASS INDEX: 22.83 KG/M2 | DIASTOLIC BLOOD PRESSURE: 84 MMHG | WEIGHT: 122.8 LBS | OXYGEN SATURATION: 97 % | RESPIRATION RATE: 16 BRPM | HEART RATE: 81 BPM | SYSTOLIC BLOOD PRESSURE: 132 MMHG | TEMPERATURE: 98.1 F

## 2024-07-03 DIAGNOSIS — C50.912 MALIGNANT NEOPLASM OF LEFT BREAST IN FEMALE, ESTROGEN RECEPTOR NEGATIVE, UNSPECIFIED SITE OF BREAST (MULTI): ICD-10-CM

## 2024-07-03 DIAGNOSIS — Z17.1 MALIGNANT NEOPLASM OF LEFT BREAST IN FEMALE, ESTROGEN RECEPTOR NEGATIVE, UNSPECIFIED SITE OF BREAST (MULTI): ICD-10-CM

## 2024-07-03 PROCEDURE — 96401 CHEMO ANTI-NEOPL SQ/IM: CPT

## 2024-07-03 PROCEDURE — 2500000004 HC RX 250 GENERAL PHARMACY W/ HCPCS (ALT 636 FOR OP/ED): Mod: JZ,JG | Performed by: STUDENT IN AN ORGANIZED HEALTH CARE EDUCATION/TRAINING PROGRAM

## 2024-07-03 RX ORDER — EPINEPHRINE 0.3 MG/.3ML
0.3 INJECTION SUBCUTANEOUS EVERY 5 MIN PRN
Status: DISCONTINUED | OUTPATIENT
Start: 2024-07-03 | End: 2024-07-03 | Stop reason: HOSPADM

## 2024-07-03 RX ORDER — FAMOTIDINE 10 MG/ML
20 INJECTION INTRAVENOUS ONCE AS NEEDED
Status: DISCONTINUED | OUTPATIENT
Start: 2024-07-03 | End: 2024-07-03 | Stop reason: HOSPADM

## 2024-07-03 RX ORDER — PROCHLORPERAZINE MALEATE 10 MG
10 TABLET ORAL EVERY 6 HOURS PRN
Status: DISCONTINUED | OUTPATIENT
Start: 2024-07-03 | End: 2024-07-03 | Stop reason: HOSPADM

## 2024-07-03 RX ORDER — PROCHLORPERAZINE EDISYLATE 5 MG/ML
10 INJECTION INTRAMUSCULAR; INTRAVENOUS EVERY 6 HOURS PRN
Status: DISCONTINUED | OUTPATIENT
Start: 2024-07-03 | End: 2024-07-03 | Stop reason: HOSPADM

## 2024-07-03 RX ORDER — DIPHENHYDRAMINE HYDROCHLORIDE 50 MG/ML
50 INJECTION INTRAMUSCULAR; INTRAVENOUS AS NEEDED
Status: DISCONTINUED | OUTPATIENT
Start: 2024-07-03 | End: 2024-07-03 | Stop reason: HOSPADM

## 2024-07-03 RX ORDER — ALBUTEROL SULFATE 0.83 MG/ML
3 SOLUTION RESPIRATORY (INHALATION) AS NEEDED
Status: DISCONTINUED | OUTPATIENT
Start: 2024-07-03 | End: 2024-07-03 | Stop reason: HOSPADM

## 2024-07-03 ASSESSMENT — PAIN SCALES - GENERAL: PAINLEVEL: 0-NO PAIN

## 2024-07-15 NOTE — PROGRESS NOTES
- You will receive a copy of your genetic testing results in the mail. Below is a summary of what we discussed at your appointment.    - Linda C Hegarty is a 78 y.o. female with a recent diagnosis of breast cancer and a family history of breast cancer.  - Linda C Hegarty was initially seen in the [facility] on 4/25/24 for counseling and coordination of testing.    - Based on her personal history and family history, the 71 gene CancerNext-Expanded Panel from [facility] was recommended and ordered.  - I called her to review the results of this testing, and our discussion is summarized below.    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    -The MUTYH gene is associated with  MUTYH-associated polyposis syndrome (or MAP), characterized by a predisposition to many polyps ( adenomas over their lifetime)  as well as an  increased lifetime risk of colorectal cancer (70-90% lifetime risk compared to the average risk of 4%). There is additional cancer risks such as duodenal cancer/polyps and gastric fundic glad polyps, and there is some evidence of an increased risk for endometrial cancer. Ovarian, bladder, breast, and thyroid cancers have been reported.     As Linda C Hegarty only has ONE mutation, she is NOT at risk for MAP and is instead considered a CARRIER. Carriers of MAP have not been found to have an increased risk for colon cancer or polyposis. With this finding and no first-degree relatives with colon cancer (sibling, parent, or child), NCCN reports that general population screening is appropriate.     Regarding her children, there is a 50% chance to pass on any mutations to any of her children. Thus, there is a 50% chance any children she has will also be MUTYH carriers. Should her partner ALSO be  a MUTYH carrier, there would be a 25% any of her children may have MAP and a 50% chance they may be a carrier. We briefly discussed that her children should consider their own genetic testing. If they live locally (in [place]) and would like to pursue testing, I or any of my colleagues would be glad to see you here at .  They can call [phone], option 1 to schedule an appointment for genetic counseling.  If they live outside of the [facility], they can call us to help direct them to a provider in their area. They may also find a genetics specialist by visiting the National Society for Genetic Counselors website at: http://www.nsgc.org/ under “Finding a Genetic Counselor,” with “Cancer” specified under special area.    - One reason Linda C Hegarty underwent genetic testing was to better understand her risk to develop a second breast cancer to help determine if further breast screening or surgery is indicated.  - Because her results were negative, Linda C Hegarty does not have an identifiable genetic risk factor that places her at an increased risk to develop a second breast cancer.     - Your history may still suggest hereditary breast cancer. This could be due to a mutation in a gene which is not currently detectable, or due to a mutation in a different gene that is yet to be associated with hereditary breast cancer. More testing in the future or in your family members may help to determine this.    - You should continue to follow with your care team for all cancer management recommendations. You should also follow with your primary care providers for all other age-related cancer screenings, such as Pap smears, colonoscopies, etc.    - Regarding your children, if there is no significant history of cancer on their father's side, no genetic testing is recommended for them at this time, since your test results did not show anything they need to be tested for.    - Although her results were negative, Glenis LUCIA  Hegarty's female relatives are considered to have an increased risk to develop breast cancer over the general population, based on the family history alone.   - They should speak to their healthcare providers about their breast cancer screening protocols, as they may be a candidate for annual breast MRIs, in addition to an annual mammogram and clinic breast exam.   - Breast cancer screening should also begin 10 years younger than the youngest diagnosis in the family, or by age 40, whichever comes first.   - This would be 40 for her daughter.    - Our understanding of genetic contribution to cancer diagnoses is always evolving, so there may be additional testing recommended in the future. She can contact us in 3-5 years to determine if there have been any changes since our discussion today.      Karin Beach MS, [facility]  Certified Genetic Counselor  [place] [facility]  [phone]    Reviewed by:  Dr. Fiordaliza Benjamin  Clinical   [place] [facility]  [phone]

## 2024-07-17 ENCOUNTER — APPOINTMENT (OUTPATIENT)
Dept: GENETICS | Facility: CLINIC | Age: 79
End: 2024-07-17
Payer: MEDICARE

## 2024-07-17 DIAGNOSIS — C50.919 BREAST CANCER METASTASIZED TO MULTIPLE SITES, UNSPECIFIED LATERALITY (MULTI): ICD-10-CM

## 2024-07-17 PROCEDURE — GENMD PR GENETICS VISIT (MEDICAID/MEDICARE)

## 2024-07-24 ENCOUNTER — INFUSION (OUTPATIENT)
Dept: HEMATOLOGY/ONCOLOGY | Facility: CLINIC | Age: 79
End: 2024-07-24
Payer: MEDICARE

## 2024-07-24 VITALS
SYSTOLIC BLOOD PRESSURE: 119 MMHG | OXYGEN SATURATION: 96 % | HEART RATE: 90 BPM | WEIGHT: 122.58 LBS | DIASTOLIC BLOOD PRESSURE: 81 MMHG | RESPIRATION RATE: 16 BRPM | BODY MASS INDEX: 22.79 KG/M2 | TEMPERATURE: 97 F

## 2024-07-24 DIAGNOSIS — Z17.1 MALIGNANT NEOPLASM OF LEFT BREAST IN FEMALE, ESTROGEN RECEPTOR NEGATIVE, UNSPECIFIED SITE OF BREAST (MULTI): Primary | ICD-10-CM

## 2024-07-24 DIAGNOSIS — C50.912 MALIGNANT NEOPLASM OF LEFT BREAST IN FEMALE, ESTROGEN RECEPTOR NEGATIVE, UNSPECIFIED SITE OF BREAST (MULTI): ICD-10-CM

## 2024-07-24 DIAGNOSIS — Z17.1 MALIGNANT NEOPLASM OF LEFT BREAST IN FEMALE, ESTROGEN RECEPTOR NEGATIVE, UNSPECIFIED SITE OF BREAST (MULTI): ICD-10-CM

## 2024-07-24 DIAGNOSIS — C50.912 MALIGNANT NEOPLASM OF LEFT BREAST IN FEMALE, ESTROGEN RECEPTOR NEGATIVE, UNSPECIFIED SITE OF BREAST (MULTI): Primary | ICD-10-CM

## 2024-07-24 PROCEDURE — 96402 CHEMO HORMON ANTINEOPL SQ/IM: CPT

## 2024-07-24 PROCEDURE — 2500000004 HC RX 250 GENERAL PHARMACY W/ HCPCS (ALT 636 FOR OP/ED): Mod: JZ,JG | Performed by: STUDENT IN AN ORGANIZED HEALTH CARE EDUCATION/TRAINING PROGRAM

## 2024-07-24 RX ORDER — DIPHENHYDRAMINE HYDROCHLORIDE 50 MG/ML
50 INJECTION INTRAMUSCULAR; INTRAVENOUS AS NEEDED
Status: DISCONTINUED | OUTPATIENT
Start: 2024-07-24 | End: 2024-07-24 | Stop reason: HOSPADM

## 2024-07-24 RX ORDER — PROCHLORPERAZINE EDISYLATE 5 MG/ML
10 INJECTION INTRAMUSCULAR; INTRAVENOUS EVERY 6 HOURS PRN
Status: DISCONTINUED | OUTPATIENT
Start: 2024-07-24 | End: 2024-07-24 | Stop reason: HOSPADM

## 2024-07-24 RX ORDER — PROCHLORPERAZINE MALEATE 10 MG
10 TABLET ORAL EVERY 6 HOURS PRN
Status: DISCONTINUED | OUTPATIENT
Start: 2024-07-24 | End: 2024-07-24 | Stop reason: HOSPADM

## 2024-07-24 RX ORDER — ALBUTEROL SULFATE 0.83 MG/ML
3 SOLUTION RESPIRATORY (INHALATION) AS NEEDED
Status: DISCONTINUED | OUTPATIENT
Start: 2024-07-24 | End: 2024-07-24 | Stop reason: HOSPADM

## 2024-07-24 RX ORDER — FAMOTIDINE 10 MG/ML
20 INJECTION INTRAVENOUS ONCE AS NEEDED
Status: DISCONTINUED | OUTPATIENT
Start: 2024-07-24 | End: 2024-07-24 | Stop reason: HOSPADM

## 2024-07-24 RX ORDER — EPINEPHRINE 0.3 MG/.3ML
0.3 INJECTION SUBCUTANEOUS EVERY 5 MIN PRN
Status: DISCONTINUED | OUTPATIENT
Start: 2024-07-24 | End: 2024-07-24 | Stop reason: HOSPADM

## 2024-07-24 ASSESSMENT — PAIN SCALES - GENERAL: PAINLEVEL: 0-NO PAIN

## 2024-07-24 NOTE — PROGRESS NOTES
Subjective   Linda C Hegarty is a 78 y.o. female     Patient has a medical history of Paget's disease of the left breast.  Left breast mastectomy with sentinel lymph node biopsy performed March 2012.  Has been on Herceptin/Perjeta since 2015; now on Phesgo (Pertuzumab/trastuzumab/hyaluronidase).    Echocardiogram dated October 2023 shows low normal ejection fraction on my review (45 to 50%).  Appears largely identical to echocardiogram dated March 2023.  Ascending aortic aneurysm measuring up to 4.3 cm noted on CT chest with contrast September 2023.  (Largely stable compared to scans dated January 2023 & October 2022).    On this visit patient denies any limitations with walking.  Denies any chest discomfort or shortness breath.  Denies any orthopnea/PND.  Has minimal lower extremity edema.  Notably was previously on higher doses of ramipril/Coreg with resultant dizziness and falls.  Has not had any of those complaints since down titration of those medications.    Current Outpatient Medications   Medication Instructions    amitriptyline (Elavil) 10 mg tablet TAKE ONE TABLET BY MOUTH DAILY AT BEDTIME; take in addition to 25mg tablet if needed    amoxicillin (AMOXIL) 500 mg, oral, 3 times daily, Dental visit or UTI    ascorbic acid (VITAMIN C) 1,000 mg, oral    aspirin 81 mg chewable tablet 1 tablet, oral, Daily    atorvastatin (LIPITOR) 40 mg, oral, Nightly    calcium citrate (Calcitrate) 200 mg (950 mg) tablet 1 tablet, oral, Daily    carvedilol (COREG) 3.125 mg, oral, 2 times daily (morning and late afternoon)    diphenhydrAMINE (BENADRYL) 25 mg, oral, Nightly    empagliflozin (JARDIANCE) 10 mg, oral, Daily    famotidine (PEPCID) 40 mg, oral, Daily    fexofenadine (ALLEGRA) 180 mg, oral, Daily RT    Lactobacillus acidophilus (PROBIOTIC ORAL) oral, Chewable tablet    lansoprazole (PREVACID) 30 mg, oral, 2 times daily    nitrofurantoin, macrocrystal-monohydrate, (Macrobid) 100 mg capsule TAKE 1 CAPSULE WITH FOOD BY  MOUTH EVERY 12 HOURS FOR 5 DAYS    ondansetron (ZOFRAN) 4 mg, oral, Every 8 hours PRN    ondansetron ODT (Zofran-ODT) 8 mg disintegrating tablet DISSOLVE ONE TABLET IN MOUTH THREE TIMES A DAY AS NEEDED    PERTUZUMAB IV 1 Dose, intravenous    pertuzumab-trastuzumab-hy-zzxf (PHESGO SUBQ) subcutaneous    Premarin vaginal cream INSERT 0.5 GRAMS VAGINALLY ONCE DAILY FOR 2 WEEKS  THEN DECREASE TO 0.5 GRAMS ONCE A  WEEK    ramipril (ALTACE) 2.5 mg, oral, Daily    sucralfate (CARAFATE) 1 g, oral, 2 times daily    sulfamethoxazole-trimethoprim (Bactrim) 400-80 mg tablet 0.5 tablets, oral, Daily    TRASTUZUMAB IV 2 mg/kg, intravenous         Review of Systems  A comprehensive review of systems was negative.     Past Medical History:   Diagnosis Date    Malignant neoplasm of nipple and areola, unspecified female breast (Multi)     Pagets disease, breast    Other abnormal and inconclusive findings on diagnostic imaging of breast 10/24/2017    Abnormal mammogram of right breast    Other conditions influencing health status 09/10/2014    History of pregnancy    Personal history of other complications of pregnancy, childbirth and the puerperium 09/10/2014    History of spontaneous     Personal history of urinary (tract) infections 09/10/2014    History of recurrent UTI (urinary tract infection)       Past Surgical History:   Procedure Laterality Date    BLADDER SURGERY  09/10/2014    Bladder Surgery    HYSTERECTOMY  09/10/2014    Hysterectomy    IR CVC PORT REMOVAL  10/16/2023    IR CVC PORT REMOVAL 10/16/2023 Emily Ray MD PAR ANGIO    LYMPH NODE BIOPSY  2014    Biopsy Lymph Node    MR HEAD ANGIO WO IV CONTRAST  2023    MR HEAD ANGIO WO IV CONTRAST 2023    OTHER SURGICAL HISTORY  2014    Radical Mastectomy Left Breast    OTHER SURGICAL HISTORY  2017    Central Intravenous Catheter    OTHER SURGICAL HISTORY  09/10/2014    Complete Colonoscopy Rectum       Allergies   Allergen  "Reactions    Codeine Unknown, Nausea/vomiting and Rash    Insects Extract Unknown    Meperidine Unknown and Confusion    Morphine Unknown and Confusion    Pollens Extract Other       Family History   Problem Relation Name Age of Onset    Diabetes Mother      Heart failure Father      Breast cancer Sister      Diabetes Other aunt        Objective   Visit Vitals  Ht 1.562 m (5' 1.5\")   Wt 55.3 kg (122 lb)   BMI 22.68 kg/m²   OB Status Postmenopausal   Smoking Status Former   BSA 1.55 m²     General: awake, alert and oriented. No acute distress.   Skin: Skin is warm, dry and intact without rashes or lesions. Appropriate color for ethnicity. Nail beds pink with no cyanosis or clubbing  HEENT: normocephalic, atraumatic; conjunctivae are clear without exudates or hemorrhage. Sclera is non-icteric. Eyelids are normal in appearance without swelling or lesions. Hearing intact. Nares are patent bilaterally. Moist mucous membranes.   Cardiovascular: Regular. No murmurs, gallops, or rubs are auscultated. S1 and S2 are heard and are of normal intensity. No JVD, no carotid bruits  Respiratory: Thorax symmetric. CTAB, breath sounds vesicular. No crackles, wheezes or ronchi.   Gastrointestinal: soft, non-distended, BS + x 4  Genitourinary: exam deferred  Musculoskeletal: moves all extremities  Extremities: pulses palpable bilaterally; no swelling or erythema; no edema  Neurological: alert & oriented x 3; no focal deficits  Psychiatric: appropriate mood and affect      Lab Review   Lab Results   Component Value Date    HGB 11.8 (L) 12/06/2023    HGB CANCELED 09/26/2023    HGB 12.3 09/25/2023    HGB 11.6 (L) 08/04/2023     12/06/2023    WBC 6.4 12/06/2023     12/06/2023    K 4.1 12/06/2023    CREATININE 1.16 (H) 12/06/2023    CREATININE CANCELED 09/27/2023    CREATININE 1.18 (H) 09/25/2023    CREATININE 0.89 08/04/2023    BUN 19 12/06/2023    CALCIUM 9.6 12/06/2023    BNP 30 05/01/2024    TROPHS 4 03/29/2023    LDLF 58 " 11/30/2021        Assessment/Plan   Patient has a medical history of Paget's disease of the left breast.  Left breast mastectomy with sentinel lymph node biopsy performed March 2012.  Has been on Herceptin/Perjeta since 2015; now on Phesgo (Pertuzumab/trastuzumab/hyaluronidase).    Serial echocardiograms including the 1 performed July 25, 2024 have shown low normal (45-50) ejection fraction.  No signs or symptoms to suggest the clinical syndrome of heart failure.  Ascending aortic aneurysm measuring up to 4.3 cm noted on CT chest with contrast September 2023.  (Largely stable compared to scans dated January 2023 & October 2022).  On this visit patient denies any limitations with walking.  Denies any chest discomfort or shortness breath.  Denies any orthopnea/PND.  Has minimal lower extremity edema.  Notably was previously on higher doses of ramipril/Coreg with resultant dizziness and falls.  Has not had any of those complaints since down titration of those medications.    Plan:  -Monitoring cardiotoxic chemotherapy: Overall serial echocardiograms have shown largely stable ejection fraction.  Okay to continue HER2/duarte based therapies for now.  -Patient is on Jardiance 10 mg, ramipril 2.5 mg, Coreg 3.125 mg twice daily for cardioprotection  -Needs repeat CT evaluation of her cancer; will monitor her aneurysm based on those scans  -Blood pressure is well controlled  -RTC 6 months-will need limited onco echocardiogram in 3 months    Lemuel Pack MD  Advanced Heart Failure/Transplant Cardiology  Cardio-Oncology  [facility]

## 2024-07-24 NOTE — PROGRESS NOTES
See okay to treat order for echo being done over 3 months ago.  Patient without any new complaints. Message sent to team re: imaging due prior to follow up per patient. [id number]  no response yet from team re: imaging.  Patient tolerated injection without issue.  Bandaid applied.  Aware of next appt date. Patient independently ambulatory off unit in NAD and without complaints.  Gait steady.  Call back instructions reviewed.  Patient verbalized understanding.

## 2024-07-25 ENCOUNTER — HOSPITAL ENCOUNTER (OUTPATIENT)
Dept: CARDIOLOGY | Facility: HOSPITAL | Age: 79
Discharge: HOME | End: 2024-07-25
Payer: MEDICARE

## 2024-07-25 ENCOUNTER — APPOINTMENT (OUTPATIENT)
Dept: CARDIOLOGY | Facility: CLINIC | Age: 79
End: 2024-07-25
Payer: MEDICARE

## 2024-07-25 VITALS
BODY MASS INDEX: 22.45 KG/M2 | HEART RATE: 80 BPM | SYSTOLIC BLOOD PRESSURE: 120 MMHG | HEIGHT: 62 IN | OXYGEN SATURATION: 94 % | WEIGHT: 122 LBS | DIASTOLIC BLOOD PRESSURE: 80 MMHG

## 2024-07-25 VITALS — SYSTOLIC BLOOD PRESSURE: 145 MMHG | RESPIRATION RATE: 16 BRPM | DIASTOLIC BLOOD PRESSURE: 85 MMHG | HEART RATE: 86 BPM

## 2024-07-25 DIAGNOSIS — I42.7 CARDIOTOXICITY (MULTI): ICD-10-CM

## 2024-07-25 DIAGNOSIS — Z51.11 ENCOUNTER FOR ANTINEOPLASTIC CHEMOTHERAPY: ICD-10-CM

## 2024-07-25 DIAGNOSIS — Z08 ENCOUNTER FOR FOLLOW-UP SURVEILLANCE OF BREAST CANCER: ICD-10-CM

## 2024-07-25 DIAGNOSIS — I71.9 AORTIC ANEURYSM WITHOUT RUPTURE, UNSPECIFIED PORTION OF AORTA (CMS-HCC): Primary | ICD-10-CM

## 2024-07-25 DIAGNOSIS — Z85.3 ENCOUNTER FOR FOLLOW-UP SURVEILLANCE OF BREAST CANCER: ICD-10-CM

## 2024-07-25 LAB
AORTIC VALVE MEAN GRADIENT: 2 MMHG
AORTIC VALVE PEAK VELOCITY: 1 M/S
AV PEAK GRADIENT: 4 MMHG
AVA (PEAK VEL): 1.82 CM2
AVA (VTI): 2.28 CM2
EJECTION FRACTION APICAL 4 CHAMBER: 50.9
EJECTION FRACTION: 48 %
LEFT ATRIUM VOLUME AREA LENGTH INDEX BSA: 15.6 ML/M2
LEFT VENTRICLE INTERNAL DIMENSION DIASTOLE: 4.68 CM (ref 3.5–6)
LEFT VENTRICULAR OUTFLOW TRACT DIAMETER: 1.7 CM
LV EJECTION FRACTION BIPLANE: 48 %
MITRAL VALVE E/A RATIO: 0.32
RIGHT VENTRICLE FREE WALL PEAK S': 11.5 CM/S
RIGHT VENTRICLE PEAK SYSTOLIC PRESSURE: 16.2 MMHG
TRICUSPID ANNULAR PLANE SYSTOLIC EXCURSION: 1.9 CM

## 2024-07-25 PROCEDURE — 3074F SYST BP LT 130 MM HG: CPT | Performed by: STUDENT IN AN ORGANIZED HEALTH CARE EDUCATION/TRAINING PROGRAM

## 2024-07-25 PROCEDURE — 76376 3D RENDER W/INTRP POSTPROCES: CPT | Performed by: STUDENT IN AN ORGANIZED HEALTH CARE EDUCATION/TRAINING PROGRAM

## 2024-07-25 PROCEDURE — 1159F MED LIST DOCD IN RCRD: CPT | Performed by: STUDENT IN AN ORGANIZED HEALTH CARE EDUCATION/TRAINING PROGRAM

## 2024-07-25 PROCEDURE — 93325 DOPPLER ECHO COLOR FLOW MAPG: CPT | Performed by: STUDENT IN AN ORGANIZED HEALTH CARE EDUCATION/TRAINING PROGRAM

## 2024-07-25 PROCEDURE — 76376 3D RENDER W/INTRP POSTPROCES: CPT

## 2024-07-25 PROCEDURE — 93321 DOPPLER ECHO F-UP/LMTD STD: CPT | Performed by: STUDENT IN AN ORGANIZED HEALTH CARE EDUCATION/TRAINING PROGRAM

## 2024-07-25 PROCEDURE — 2500000004 HC RX 250 GENERAL PHARMACY W/ HCPCS (ALT 636 FOR OP/ED): Performed by: STUDENT IN AN ORGANIZED HEALTH CARE EDUCATION/TRAINING PROGRAM

## 2024-07-25 PROCEDURE — 3079F DIAST BP 80-89 MM HG: CPT | Performed by: STUDENT IN AN ORGANIZED HEALTH CARE EDUCATION/TRAINING PROGRAM

## 2024-07-25 PROCEDURE — 93356 MYOCRD STRAIN IMG SPCKL TRCK: CPT | Performed by: STUDENT IN AN ORGANIZED HEALTH CARE EDUCATION/TRAINING PROGRAM

## 2024-07-25 PROCEDURE — 1036F TOBACCO NON-USER: CPT | Performed by: STUDENT IN AN ORGANIZED HEALTH CARE EDUCATION/TRAINING PROGRAM

## 2024-07-25 PROCEDURE — 93308 TTE F-UP OR LMTD: CPT | Performed by: STUDENT IN AN ORGANIZED HEALTH CARE EDUCATION/TRAINING PROGRAM

## 2024-07-25 PROCEDURE — 99214 OFFICE O/P EST MOD 30 MIN: CPT | Performed by: STUDENT IN AN ORGANIZED HEALTH CARE EDUCATION/TRAINING PROGRAM

## 2024-07-26 ENCOUNTER — LAB (OUTPATIENT)
Dept: LAB | Facility: CLINIC | Age: 79
End: 2024-07-26
Payer: MEDICARE

## 2024-07-26 DIAGNOSIS — C50.912 MALIGNANT NEOPLASM OF LEFT BREAST IN FEMALE, ESTROGEN RECEPTOR NEGATIVE, UNSPECIFIED SITE OF BREAST (MULTI): ICD-10-CM

## 2024-07-26 DIAGNOSIS — Z17.1 MALIGNANT NEOPLASM OF LEFT BREAST IN FEMALE, ESTROGEN RECEPTOR NEGATIVE, UNSPECIFIED SITE OF BREAST (MULTI): ICD-10-CM

## 2024-07-26 PROCEDURE — 80053 COMPREHEN METABOLIC PANEL: CPT

## 2024-07-26 PROCEDURE — 36415 COLL VENOUS BLD VENIPUNCTURE: CPT

## 2024-07-27 LAB
ALBUMIN SERPL BCP-MCNC: 4.3 G/DL (ref 3.4–5)
ALP SERPL-CCNC: 84 U/L (ref 33–136)
ALT SERPL W P-5'-P-CCNC: 20 U/L (ref 7–45)
ANION GAP SERPL CALC-SCNC: 12 MMOL/L (ref 10–20)
AST SERPL W P-5'-P-CCNC: 22 U/L (ref 9–39)
BILIRUB SERPL-MCNC: 0.5 MG/DL (ref 0–1.2)
BUN SERPL-MCNC: 19 MG/DL (ref 6–23)
CALCIUM SERPL-MCNC: 9.6 MG/DL (ref 8.6–10.6)
CHLORIDE SERPL-SCNC: 100 MMOL/L (ref 98–107)
CO2 SERPL-SCNC: 31 MMOL/L (ref 21–32)
CREAT SERPL-MCNC: 1.03 MG/DL (ref 0.5–1.05)
EGFRCR SERPLBLD CKD-EPI 2021: 56 ML/MIN/1.73M*2
GLUCOSE SERPL-MCNC: 69 MG/DL (ref 74–99)
POTASSIUM SERPL-SCNC: 4.7 MMOL/L (ref 3.5–5.3)
PROT SERPL-MCNC: 6.6 G/DL (ref 6.4–8.2)
SODIUM SERPL-SCNC: 138 MMOL/L (ref 136–145)

## 2024-08-08 ENCOUNTER — HOSPITAL ENCOUNTER (OUTPATIENT)
Dept: RADIOLOGY | Facility: CLINIC | Age: 79
Discharge: HOME | End: 2024-08-08
Payer: MEDICARE

## 2024-08-08 DIAGNOSIS — Z17.1 MALIGNANT NEOPLASM OF LEFT BREAST IN FEMALE, ESTROGEN RECEPTOR NEGATIVE, UNSPECIFIED SITE OF BREAST (MULTI): ICD-10-CM

## 2024-08-08 DIAGNOSIS — C50.912 MALIGNANT NEOPLASM OF LEFT BREAST IN FEMALE, ESTROGEN RECEPTOR NEGATIVE, UNSPECIFIED SITE OF BREAST (MULTI): ICD-10-CM

## 2024-08-08 PROCEDURE — 2550000001 HC RX 255 CONTRASTS: Performed by: STUDENT IN AN ORGANIZED HEALTH CARE EDUCATION/TRAINING PROGRAM

## 2024-08-08 PROCEDURE — 74177 CT ABD & PELVIS W/CONTRAST: CPT

## 2024-08-12 ENCOUNTER — OFFICE VISIT (OUTPATIENT)
Dept: HEMATOLOGY/ONCOLOGY | Facility: CLINIC | Age: 79
End: 2024-08-12
Payer: MEDICARE

## 2024-08-12 VITALS
BODY MASS INDEX: 22.52 KG/M2 | WEIGHT: 121.14 LBS | TEMPERATURE: 97 F | SYSTOLIC BLOOD PRESSURE: 111 MMHG | OXYGEN SATURATION: 99 % | HEART RATE: 99 BPM | RESPIRATION RATE: 16 BRPM | DIASTOLIC BLOOD PRESSURE: 69 MMHG

## 2024-08-12 DIAGNOSIS — C50.912 MALIGNANT NEOPLASM OF LEFT BREAST IN FEMALE, ESTROGEN RECEPTOR NEGATIVE, UNSPECIFIED SITE OF BREAST (MULTI): Primary | ICD-10-CM

## 2024-08-12 DIAGNOSIS — Z17.1 MALIGNANT NEOPLASM OF LEFT BREAST IN FEMALE, ESTROGEN RECEPTOR NEGATIVE, UNSPECIFIED SITE OF BREAST (MULTI): Primary | ICD-10-CM

## 2024-08-12 DIAGNOSIS — Z09 ENCOUNTER FOR FOLLOW-UP EXAMINATION AFTER COMPLETED TREATMENT FOR CONDITIONS OTHER THAN MALIGNANT NEOPLASM: ICD-10-CM

## 2024-08-12 PROCEDURE — 3074F SYST BP LT 130 MM HG: CPT | Performed by: STUDENT IN AN ORGANIZED HEALTH CARE EDUCATION/TRAINING PROGRAM

## 2024-08-12 PROCEDURE — 99215 OFFICE O/P EST HI 40 MIN: CPT | Performed by: STUDENT IN AN ORGANIZED HEALTH CARE EDUCATION/TRAINING PROGRAM

## 2024-08-12 PROCEDURE — 1159F MED LIST DOCD IN RCRD: CPT | Performed by: STUDENT IN AN ORGANIZED HEALTH CARE EDUCATION/TRAINING PROGRAM

## 2024-08-12 PROCEDURE — 1126F AMNT PAIN NOTED NONE PRSNT: CPT | Performed by: STUDENT IN AN ORGANIZED HEALTH CARE EDUCATION/TRAINING PROGRAM

## 2024-08-12 PROCEDURE — 3078F DIAST BP <80 MM HG: CPT | Performed by: STUDENT IN AN ORGANIZED HEALTH CARE EDUCATION/TRAINING PROGRAM

## 2024-08-12 ASSESSMENT — ENCOUNTER SYMPTOMS
HOT FLASHES: 0
CONFUSION: 0
CHILLS: 0
DIZZINESS: 0
COUGH: 0
FATIGUE: 0
LEG SWELLING: 0
CONSTIPATION: 0
HEMATURIA: 0
NUMBNESS: 0
HEADACHES: 0
ARTHRALGIAS: 0
VOMITING: 0
DIFFICULTY URINATING: 0
DIARRHEA: 0
ABDOMINAL PAIN: 0
APPETITE CHANGE: 0
NAUSEA: 0
UNEXPECTED WEIGHT CHANGE: 0
DYSURIA: 0
BACK PAIN: 0
SHORTNESS OF BREATH: 0
FEVER: 0
ADENOPATHY: 0

## 2024-08-12 ASSESSMENT — PAIN SCALES - GENERAL: PAINLEVEL: 0-NO PAIN

## 2024-08-12 NOTE — PROGRESS NOTES
Breast Medical Oncology Clinic  Location: [place]    Visit Type: Follow-up Visit    Oncology History:  She presented with  Paget Disease of the left breast s/p left breast mastectomy with sentinel lymph node biopsy on 3/14/12. The final pathology showed scattered ducts in the superolateral segment of the breast involved by high grade ductal carcinoma in situ with multifocal areas of microinvasive carcinoma. DCIS and microinvasive tumor approaches to within 1 mm of the superior lateral margin. The DCIS and microinvasive were ER and IL negative. She also had 0 of 3 lymph nodes which were negative for malignancy. She then had a re-excision which was negative for residual DCIS or microinvasive disease. She had no adjuvant therapy.    She did well until 2014 when she self palpated L neck nodes. A CT neck with contrast was performed on 7/30/14 which showed enlarged and abnormally enhancing left posterior cervical space lymph node near the left subclavicular space. Smaller left posterior cervical space lymph nodes are also noted. A CT chest with contrast was also performed on 7/30/14 which showed new left axillary and retroclavicular adenopathy suspicious for metastatic disease, a new subtle 5 mm focus  in the T12 vertebral body suspicious for early osseous mets and a new 2 to 3 mm lung nodule in the right lower lobe. She had a PET/CT on 8/7/14 which showed intensely hypermetabolic lower cervical and thoracic adenopathy suspicious for recurrent/metastatic breast cancer. Also an intensely hypermetabolic subcarinal lymph node was seen with a max SUV of 8 suspicious for metastasis. She was seen by Dr. Fernando Paris on 8/11/14 and he performed an excisional biopsy of the left axillary lymph node as well as a left chest wall mass on 8/14.     The left axillary lymph node showed metastatic carcinoma with extracapsular extension which was ER <1%, IL 0% and HER2 3+ by IHC.    A bronchoscopy was performed on 9/2/14 which  showed malignant cells derived from adenocarcinoma, morphologically compatible with the patient's known history of breast cancer which were ER/WI negative by IHC and HER2 positive (3+ by IHC)     Patient started first line systemic chemotherapy treatment with trastuzumab, pertuzumab, and docetaxel on 2014 and completed 6 cycles with a CR by imaging.    Herceptin/Perjeta monotherapy started 2015    GYN History/Pertinent Family history::  Sister diagnosed with breast cancer at age 52 who underwent genetic testing and was negative for BRCA1/BRCA2 per patient.     Maternal aunt with colon cancer diagnosed in her 60s.     Maternal grandmother diagnosed with lymphoma and paternal grandmother diagnosed with liver cancer.    Gyn History: 1st period at age 15. 1st pregnancy at age 32. . Last menstrual period in        Subjective: Interval History    Presents today for follow-up visit with .     Overall, doing well.     No clinically change.     Denies weight loss, changes in the breast and/or chest wall, new aches or pains, changes in appetite or energy level.     EF monitoring with Dr. Pack.     Trip to [place] coming up.       ROS:     Review of Systems   Constitutional:  Negative for appetite change, chills, fatigue, fever and unexpected weight change.   HENT:   Negative for mouth sores.    Respiratory:  Negative for cough and shortness of breath.    Cardiovascular:  Negative for chest pain and leg swelling.   Gastrointestinal:  Negative for abdominal pain, constipation, diarrhea, nausea and vomiting.   Endocrine: Negative for hot flashes.   Genitourinary:  Negative for difficulty urinating, dysuria and hematuria.    Musculoskeletal:  Negative for arthralgias and back pain.   Skin:  Negative for rash.   Neurological:  Negative for dizziness, headaches and numbness.   Hematological:  Negative for adenopathy.   Psychiatric/Behavioral:  Negative for confusion.         Physical  Examination:    There were no vitals taken for this visit.    Physical Exam  Vitals and nursing note reviewed.   Constitutional:       General: She is not in acute distress.     Appearance: Normal appearance. She is not toxic-appearing.   HENT:      Head: Normocephalic and atraumatic.   Eyes:      Conjunctiva/sclera: Conjunctivae normal.   Cardiovascular:      Rate and Rhythm: Normal rate.   Pulmonary:      Effort: Pulmonary effort is normal. No respiratory distress.   Abdominal:      General: Abdomen is flat.      Palpations: Abdomen is soft.   Musculoskeletal:         General: No swelling. Normal range of motion.      Cervical back: Normal range of motion.   Skin:     General: Skin is warm and dry.   Neurological:      Mental Status: She is alert.   Psychiatric:         Mood and Affect: Mood normal.         Breast Examination: deferred    L mastectomy, chest wall unremarkable.  R breast without masses, skin or nipple changes.         ECOG Performance Status:     [x] 0 Fully active, able to carry on all pre-disease performance without restriction  [] 1 Restricted in physically strenuous activity but ambulatory and able to carry out work of a light or sedentary nature, e.g., light house work, office work  [] 2 Ambulatory and capable of all selfcare but unable to carry out any work activities; up and about more than 50% of waking hours  [] 3 Capable of only limited selfcare; confined to bed or chair more than 50% of waking hours  [] 4 Completely disabled; cannot carry on any selfcare; totally confined to bed or chair  [] 5 Dead     Labs:  None since last visit.     Imagin/24/24: NM bone scan: No evidence of new osseous metastasis    4/3/24: ECHO- 45-50% LV    Pathology:    None since last visit.     Assessment:     Metastatic ER/LA negative, HER2 positive breast cancer diagnosed in . S/p THPx6 and has been on maintenance herceptin/perjeta (phesgo) since .     Tolerating phesgo; re-staging scans stable.  Following EF closely with help from onco-cardiology. We discussed treatment break given EARL for many years. Patient agreed to proceed with this. Will restaging in 3 months and determine whether to continue ongoing break or for defined period of time. Patient expresses looking forward to this time off.     Plan:    Surgical Plan: S/p L mastectomy and SLNBx in 2012  Additional biopsy: Not indicated  Radiation Plan: Not indicated  Additional staging scans/DEXA/echo: Recent staging scans reviewed. She will require echocardiogram every 3 months while receiving HER2 directed therapy managed by onco-cardiology.  Additional Path info (i.e Ki67, PDL1): N/A  Gene assays: Not indicated    Systemic treatment plan: Remains on maintenance herceptin/perjeta since 2015, now on phesgo Treatment break initiated this visit, last phesgo 7/24/24     Intent: Palliative/disease control   Clinical trial: N/A   Endocrine therapy: Not indicated   HER2 treatment: Remains on maintenance herceptin/perjeta since 2015, now on phesgo   Targeted agents: Not indicated   Chemotherapy: Received Docetaxel/Herceptin/Perjeta X6 in 2014   BMA: N/A    Access: Removed  Supportive meds: N/A  Genetic testing: Information given to patient. She has not had genetic testing, sister was negative. Referral in place.   Fertility preservation: Not indicated  Other active problems/orders:     Osteopenia: 5/2021 DEXA. Updated ordered today, will determine need for bone modifying agent. We discussed general recommendations for bone loss prevention which include 3133-0417 mg of calcium intake per day for adults >50yrs, and no history of renal calculi; 800-1000 IU of vitamin D3 per day for adults >50yrs, and no history of renal calculi. Weight bearing exercise. Discontinue smoking. Avoid excessive use of caffeine, soft drinks, and alcoholic beverages. In addition, balance training and fall prevention programs can help reduce the risk of fractures.     Enlarged AAA: She  follows with cardiology    Surveillance plan: Will order CT CAP in 3 months    Follow-up: 3 months for scan review.     Patient expressed understanding of the plan outlined above. She had ample time to ask questions. She understands she can contact us should she have additional questions or issues arise in the interim.

## 2024-08-14 ENCOUNTER — APPOINTMENT (OUTPATIENT)
Dept: HEMATOLOGY/ONCOLOGY | Facility: CLINIC | Age: 79
End: 2024-08-14
Payer: MEDICARE

## 2024-10-17 DIAGNOSIS — I50.42 CHRONIC COMBINED SYSTOLIC AND DIASTOLIC HEART FAILURE: ICD-10-CM

## 2024-10-17 RX ORDER — CARVEDILOL 3.12 MG/1
3.12 TABLET ORAL
Qty: 180 TABLET | Refills: 3 | Status: SHIPPED | OUTPATIENT
Start: 2024-10-17 | End: 2025-10-17

## 2024-10-25 ENCOUNTER — HOSPITAL ENCOUNTER (OUTPATIENT)
Dept: CARDIOLOGY | Facility: HOSPITAL | Age: 79
Discharge: HOME | End: 2024-10-25
Payer: MEDICARE

## 2024-10-25 VITALS
RESPIRATION RATE: 18 BRPM | OXYGEN SATURATION: 99 % | HEART RATE: 84 BPM | DIASTOLIC BLOOD PRESSURE: 99 MMHG | SYSTOLIC BLOOD PRESSURE: 147 MMHG

## 2024-10-25 DIAGNOSIS — Z08 ENCOUNTER FOR FOLLOW-UP SURVEILLANCE OF BREAST CANCER: ICD-10-CM

## 2024-10-25 DIAGNOSIS — Z85.3 ENCOUNTER FOR FOLLOW-UP SURVEILLANCE OF BREAST CANCER: ICD-10-CM

## 2024-10-25 DIAGNOSIS — I71.9 AORTIC ANEURYSM WITHOUT RUPTURE, UNSPECIFIED PORTION OF AORTA (CMS-HCC): ICD-10-CM

## 2024-10-25 DIAGNOSIS — I42.7 CARDIOTOXICITY (MULTI): ICD-10-CM

## 2024-10-25 LAB
AORTIC VALVE MEAN GRADIENT: 3 MMHG
AORTIC VALVE PEAK VELOCITY: 1.4 M/S
AV PEAK GRADIENT: 7.8 MMHG
AVA (PEAK VEL): 1.9 CM2
AVA (VTI): 2 CM2
EJECTION FRACTION APICAL 4 CHAMBER: 45.7
EJECTION FRACTION: 50 %
LEFT ATRIUM VOLUME AREA LENGTH INDEX BSA: 23.1 ML/M2
LEFT VENTRICLE INTERNAL DIMENSION DIASTOLE: 4.39 CM (ref 3.5–6)
LEFT VENTRICULAR OUTFLOW TRACT DIAMETER: 1.9 CM
LV EJECTION FRACTION BIPLANE: 50 %
MITRAL VALVE E/A RATIO: 0.65
RIGHT VENTRICLE PEAK SYSTOLIC PRESSURE: 29.4 MMHG
TRICUSPID ANNULAR PLANE SYSTOLIC EXCURSION: 1.8 CM

## 2024-10-25 PROCEDURE — 93325 DOPPLER ECHO COLOR FLOW MAPG: CPT | Performed by: STUDENT IN AN ORGANIZED HEALTH CARE EDUCATION/TRAINING PROGRAM

## 2024-10-25 PROCEDURE — 76376 3D RENDER W/INTRP POSTPROCES: CPT | Performed by: STUDENT IN AN ORGANIZED HEALTH CARE EDUCATION/TRAINING PROGRAM

## 2024-10-25 PROCEDURE — 93356 MYOCRD STRAIN IMG SPCKL TRCK: CPT | Performed by: STUDENT IN AN ORGANIZED HEALTH CARE EDUCATION/TRAINING PROGRAM

## 2024-10-25 PROCEDURE — 93321 DOPPLER ECHO F-UP/LMTD STD: CPT | Performed by: STUDENT IN AN ORGANIZED HEALTH CARE EDUCATION/TRAINING PROGRAM

## 2024-10-25 PROCEDURE — 2500000004 HC RX 250 GENERAL PHARMACY W/ HCPCS (ALT 636 FOR OP/ED): Performed by: STUDENT IN AN ORGANIZED HEALTH CARE EDUCATION/TRAINING PROGRAM

## 2024-10-25 PROCEDURE — 93308 TTE F-UP OR LMTD: CPT | Performed by: STUDENT IN AN ORGANIZED HEALTH CARE EDUCATION/TRAINING PROGRAM

## 2024-10-25 PROCEDURE — 76376 3D RENDER W/INTRP POSTPROCES: CPT

## 2024-11-11 ENCOUNTER — TELEPHONE (OUTPATIENT)
Dept: HEMATOLOGY/ONCOLOGY | Facility: HOSPITAL | Age: 79
End: 2024-11-11
Payer: MEDICARE

## 2024-11-11 DIAGNOSIS — C50.912 MALIGNANT NEOPLASM OF LEFT BREAST IN FEMALE, ESTROGEN RECEPTOR NEGATIVE, UNSPECIFIED SITE OF BREAST: Primary | ICD-10-CM

## 2024-11-11 DIAGNOSIS — Z17.1 MALIGNANT NEOPLASM OF LEFT BREAST IN FEMALE, ESTROGEN RECEPTOR NEGATIVE, UNSPECIFIED SITE OF BREAST: Primary | ICD-10-CM

## 2024-11-11 NOTE — TELEPHONE ENCOUNTER
Lab orders placed by Dr. Dye.    Called pt back to let her know. Understanding verbalized with teach back. No further needs at this time.

## 2024-11-11 NOTE — TELEPHONE ENCOUNTER
Glenis calls today to request lab work orders prior to her CT scan on Thursday. She would like to have her labs drawn tomorrow while she is there for a bone scan.    Message sent to team.

## 2024-11-12 ENCOUNTER — LAB (OUTPATIENT)
Dept: LAB | Facility: LAB | Age: 79
End: 2024-11-12
Payer: MEDICARE

## 2024-11-12 ENCOUNTER — HOSPITAL ENCOUNTER (OUTPATIENT)
Dept: RADIOLOGY | Facility: CLINIC | Age: 79
Discharge: HOME | End: 2024-11-12
Payer: MEDICARE

## 2024-11-12 DIAGNOSIS — Z17.1 MALIGNANT NEOPLASM OF LEFT BREAST IN FEMALE, ESTROGEN RECEPTOR NEGATIVE, UNSPECIFIED SITE OF BREAST: ICD-10-CM

## 2024-11-12 DIAGNOSIS — Z09 ENCOUNTER FOR FOLLOW-UP EXAMINATION AFTER COMPLETED TREATMENT FOR CONDITIONS OTHER THAN MALIGNANT NEOPLASM: ICD-10-CM

## 2024-11-12 DIAGNOSIS — C50.912 MALIGNANT NEOPLASM OF LEFT BREAST IN FEMALE, ESTROGEN RECEPTOR NEGATIVE, UNSPECIFIED SITE OF BREAST: ICD-10-CM

## 2024-11-12 LAB
ALBUMIN SERPL BCP-MCNC: 3.6 G/DL (ref 3.4–5)
ALP SERPL-CCNC: 88 U/L (ref 33–136)
ALT SERPL W P-5'-P-CCNC: 16 U/L (ref 7–45)
ANION GAP SERPL CALC-SCNC: 8 MMOL/L (ref 10–20)
AST SERPL W P-5'-P-CCNC: 21 U/L (ref 9–39)
BASOPHILS # BLD AUTO: 0.07 X10*3/UL (ref 0–0.1)
BASOPHILS NFR BLD AUTO: 1.4 %
BILIRUB SERPL-MCNC: 0.4 MG/DL (ref 0–1.2)
BUN SERPL-MCNC: 17 MG/DL (ref 6–23)
CALCIUM SERPL-MCNC: 9.1 MG/DL (ref 8.6–10.6)
CHLORIDE SERPL-SCNC: 104 MMOL/L (ref 98–107)
CO2 SERPL-SCNC: 34 MMOL/L (ref 21–32)
CREAT SERPL-MCNC: 1.12 MG/DL (ref 0.5–1.05)
EGFRCR SERPLBLD CKD-EPI 2021: 51 ML/MIN/1.73M*2
EOSINOPHIL # BLD AUTO: 0.33 X10*3/UL (ref 0–0.4)
EOSINOPHIL NFR BLD AUTO: 6.7 %
ERYTHROCYTE [DISTWIDTH] IN BLOOD BY AUTOMATED COUNT: 12.8 % (ref 11.5–14.5)
GLUCOSE SERPL-MCNC: 85 MG/DL (ref 74–99)
HCT VFR BLD AUTO: 38.7 % (ref 36–46)
HGB BLD-MCNC: 12.4 G/DL (ref 12–16)
IMM GRANULOCYTES # BLD AUTO: 0.03 X10*3/UL (ref 0–0.5)
IMM GRANULOCYTES NFR BLD AUTO: 0.6 % (ref 0–0.9)
LYMPHOCYTES # BLD AUTO: 1.73 X10*3/UL (ref 0.8–3)
LYMPHOCYTES NFR BLD AUTO: 35.1 %
MCH RBC QN AUTO: 31.9 PG (ref 26–34)
MCHC RBC AUTO-ENTMCNC: 32 G/DL (ref 32–36)
MCV RBC AUTO: 100 FL (ref 80–100)
MONOCYTES # BLD AUTO: 0.42 X10*3/UL (ref 0.05–0.8)
MONOCYTES NFR BLD AUTO: 8.5 %
NEUTROPHILS # BLD AUTO: 2.35 X10*3/UL (ref 1.6–5.5)
NEUTROPHILS NFR BLD AUTO: 47.7 %
NRBC BLD-RTO: 0 /100 WBCS (ref 0–0)
PLATELET # BLD AUTO: 243 X10*3/UL (ref 150–450)
POTASSIUM SERPL-SCNC: 4.5 MMOL/L (ref 3.5–5.3)
PROT SERPL-MCNC: 5.7 G/DL (ref 6.4–8.2)
RBC # BLD AUTO: 3.89 X10*6/UL (ref 4–5.2)
SODIUM SERPL-SCNC: 141 MMOL/L (ref 136–145)
WBC # BLD AUTO: 4.9 X10*3/UL (ref 4.4–11.3)

## 2024-11-12 PROCEDURE — 77080 DXA BONE DENSITY AXIAL: CPT

## 2024-11-12 PROCEDURE — 77080 DXA BONE DENSITY AXIAL: CPT | Performed by: STUDENT IN AN ORGANIZED HEALTH CARE EDUCATION/TRAINING PROGRAM

## 2024-11-12 PROCEDURE — 80053 COMPREHEN METABOLIC PANEL: CPT

## 2024-11-12 PROCEDURE — 36415 COLL VENOUS BLD VENIPUNCTURE: CPT

## 2024-11-12 PROCEDURE — 85025 COMPLETE CBC W/AUTO DIFF WBC: CPT

## 2024-11-14 ENCOUNTER — HOSPITAL ENCOUNTER (OUTPATIENT)
Dept: RADIOLOGY | Facility: CLINIC | Age: 79
Discharge: HOME | End: 2024-11-14
Payer: MEDICARE

## 2024-11-14 DIAGNOSIS — C50.912 MALIGNANT NEOPLASM OF LEFT BREAST IN FEMALE, ESTROGEN RECEPTOR NEGATIVE, UNSPECIFIED SITE OF BREAST: ICD-10-CM

## 2024-11-14 DIAGNOSIS — Z17.1 MALIGNANT NEOPLASM OF LEFT BREAST IN FEMALE, ESTROGEN RECEPTOR NEGATIVE, UNSPECIFIED SITE OF BREAST: ICD-10-CM

## 2024-11-14 PROCEDURE — 2550000001 HC RX 255 CONTRASTS: Performed by: STUDENT IN AN ORGANIZED HEALTH CARE EDUCATION/TRAINING PROGRAM

## 2024-11-14 PROCEDURE — 71260 CT THORAX DX C+: CPT

## 2024-11-14 PROCEDURE — 74177 CT ABD & PELVIS W/CONTRAST: CPT | Performed by: RADIOLOGY

## 2024-11-14 PROCEDURE — 71260 CT THORAX DX C+: CPT | Performed by: RADIOLOGY

## 2024-11-15 DIAGNOSIS — I70.0 ATHEROSCLEROSIS OF AORTA (CMS-HCC): ICD-10-CM

## 2024-11-15 DIAGNOSIS — I77.819 DILATION OF AORTA (CMS-HCC): Primary | ICD-10-CM

## 2024-11-25 ENCOUNTER — OFFICE VISIT (OUTPATIENT)
Dept: HEMATOLOGY/ONCOLOGY | Facility: CLINIC | Age: 79
End: 2024-11-25
Payer: MEDICARE

## 2024-11-25 VITALS
BODY MASS INDEX: 22.91 KG/M2 | WEIGHT: 123.24 LBS | HEART RATE: 78 BPM | TEMPERATURE: 97.7 F | OXYGEN SATURATION: 99 % | RESPIRATION RATE: 16 BRPM | DIASTOLIC BLOOD PRESSURE: 85 MMHG | SYSTOLIC BLOOD PRESSURE: 131 MMHG

## 2024-11-25 DIAGNOSIS — C50.912 MALIGNANT NEOPLASM OF LEFT BREAST IN FEMALE, ESTROGEN RECEPTOR NEGATIVE, UNSPECIFIED SITE OF BREAST: ICD-10-CM

## 2024-11-25 DIAGNOSIS — Z17.1 MALIGNANT NEOPLASM OF LEFT BREAST IN FEMALE, ESTROGEN RECEPTOR NEGATIVE, UNSPECIFIED SITE OF BREAST: ICD-10-CM

## 2024-11-25 DIAGNOSIS — Z09 ENCOUNTER FOR FOLLOW-UP EXAMINATION AFTER COMPLETED TREATMENT FOR CONDITIONS OTHER THAN MALIGNANT NEOPLASM: ICD-10-CM

## 2024-11-25 PROCEDURE — 1126F AMNT PAIN NOTED NONE PRSNT: CPT | Performed by: STUDENT IN AN ORGANIZED HEALTH CARE EDUCATION/TRAINING PROGRAM

## 2024-11-25 PROCEDURE — 99215 OFFICE O/P EST HI 40 MIN: CPT | Performed by: STUDENT IN AN ORGANIZED HEALTH CARE EDUCATION/TRAINING PROGRAM

## 2024-11-25 PROCEDURE — 3075F SYST BP GE 130 - 139MM HG: CPT | Performed by: STUDENT IN AN ORGANIZED HEALTH CARE EDUCATION/TRAINING PROGRAM

## 2024-11-25 PROCEDURE — 3079F DIAST BP 80-89 MM HG: CPT | Performed by: STUDENT IN AN ORGANIZED HEALTH CARE EDUCATION/TRAINING PROGRAM

## 2024-11-25 ASSESSMENT — ENCOUNTER SYMPTOMS
HEADACHES: 0
DIFFICULTY URINATING: 0
ARTHRALGIAS: 0
HEMATURIA: 0
BACK PAIN: 0
CHILLS: 0
HOT FLASHES: 0
DIARRHEA: 0
CONSTIPATION: 0
VOMITING: 0
FEVER: 0
CONFUSION: 0
COUGH: 0
NUMBNESS: 0
DYSURIA: 0
ABDOMINAL PAIN: 0
DIZZINESS: 0
NAUSEA: 0
LEG SWELLING: 0
FATIGUE: 0
ADENOPATHY: 0
SHORTNESS OF BREATH: 0
UNEXPECTED WEIGHT CHANGE: 0
APPETITE CHANGE: 0

## 2024-11-25 ASSESSMENT — PAIN SCALES - GENERAL: PAINLEVEL_OUTOF10: 0-NO PAIN

## 2024-11-25 NOTE — PROGRESS NOTES
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 The final pathology showed scattered ducts in the superolateral segment of the breast involved by high grade ductal carcinoma in situ with multifocal areas of microinvasive carcinoma. DCIS and microinvasive tumor approaches to within 1 mm of the superior lateral margin. The DCIS and microinvasive were ER and NJ negative. She also had 0 of 3 lymph nodes which were negative for malignancy. She then had a re-excision which was negative for residual DCIS or microinvasive disease. She had no adjuvant therapy.    She did well until 2014 when she self palpated L neck nodes. A CT neck with contrast was performed on 7/30/14 which showed enlarged and abnormally enhancing left posterior cervical space lymph node near the left subclavicular space. Smaller left posterior cervical space lymph nodes are also noted. A CT chest with contrast was also performed on 7/30/14 which showed new left axillary and retroclavicular adenopathy suspicious for metastatic disease, a new subtle 5 mm focus  in the T12 vertebral body suspicious for early osseous mets and a new 2 to 3 mm lung nodule in the right lower lobe. She had a PET/CT on 8/7/14 which showed intensely hypermetabolic lower cervical and thoracic adenopathy suspicious for recurrent/metastatic breast cancer. Also an intensely hypermetabolic subcarinal lymph node was seen with a max SUV of 8 suspicious for metastasis. She was seen by Dr. Fernando Paris on 8/11/14 and he performed an excisional biopsy of the left axillary lymph node as well as a left chest wall mass on 8/14.     The left axillary lymph node showed metastatic carcinoma with extracapsular extension which was ER <1%, NJ 0% and HER2 3+ by IHC.    A bronchoscopy was performed on 9/2/14 which  showed malignant cells derived from adenocarcinoma, morphologically compatible with the patient's known history of breast cancer which were ER/SD negative by IHC and HER2 positive (3+ by IHC)     Patient started first line systemic chemotherapy treatment with trastuzumab, pertuzumab, and docetaxel on 2014 and completed 6 cycles with a CR by imaging.    Herceptin/Perjeta monotherapy started 2015    Treatment breast initiated 2024    GYN History/Pertinent Family history::  Sister diagnosed with breast cancer at age 52 who underwent genetic testing and was negative for BRCA1/BRCA2 per patient.     Maternal aunt with colon cancer diagnosed in her 60s.     Maternal grandmother diagnosed with lymphoma and paternal grandmother diagnosed with liver cancer.    Gyn History: 1st period at age 15. 1st pregnancy at age 32. . Last menstrual period in        Subjective: Interval History    Presents today for follow-up visit. She is alone. She has been enjoying the freedom of treatment break and not being restricted to every three weeks infusion apts. She reports she does not necessarily feel better than her baseline as she was already feeling well. Denies new aches pains, headaches, nausea, vomiting, changes in the breast, etc. Recent UTI. No new concerns. Wishes to continue treatment break.     ROS:     Review of Systems   Constitutional:  Negative for appetite change, chills, fatigue, fever and unexpected weight change.   HENT:   Negative for mouth sores.    Respiratory:  Negative for cough and shortness of breath.    Cardiovascular:  Negative for chest pain and leg swelling.   Gastrointestinal:  Negative for abdominal pain, constipation, diarrhea, nausea and vomiting.   Endocrine: Negative for hot flashes.   Genitourinary:  Negative for difficulty urinating, dysuria and hematuria.    Musculoskeletal:  Negative for arthralgias and back pain.   Skin:  Negative for rash.   Neurological:  Negative for  dizziness, headaches and numbness.   Hematological:  Negative for adenopathy.   Psychiatric/Behavioral:  Negative for confusion.         Physical Examination:    /85   Pulse 78   Temp 36.5 °C (97.7 °F) (Temporal)   Resp 16   Wt 55.9 kg (123 lb 3.8 oz)   SpO2 99%   BMI 22.91 kg/m²     Physical Exam  Vitals and nursing note reviewed.   Constitutional:       General: She is not in acute distress.     Appearance: Normal appearance. She is not toxic-appearing.   HENT:      Head: Normocephalic and atraumatic.   Eyes:      Conjunctiva/sclera: Conjunctivae normal.   Cardiovascular:      Rate and Rhythm: Normal rate.   Pulmonary:      Effort: Pulmonary effort is normal. No respiratory distress.   Abdominal:      General: Abdomen is flat.      Palpations: Abdomen is soft.   Musculoskeletal:         General: No swelling. Normal range of motion.      Cervical back: Normal range of motion.   Skin:     General: Skin is warm and dry.   Neurological:      Mental Status: She is alert.   Psychiatric:         Mood and Affect: Mood normal.       Breast Examination: deferred    L mastectomy, chest wall unremarkable.  R breast without masses, skin or nipple changes.         ECOG Performance Status:     [x] 0 Fully active, able to carry on all pre-disease performance without restriction  [] 1 Restricted in physically strenuous activity but ambulatory and able to carry out work of a light or sedentary nature, e.g., light house work, office work  [] 2 Ambulatory and capable of all selfcare but unable to carry out any work activities; up and about more than 50% of waking hours  [] 3 Capable of only limited selfcare; confined to bed or chair more than 50% of waking hours  [] 4 Completely disabled; cannot carry on any selfcare; totally confined to bed or chair  [] 5 Dead     Labs:  None since last visit.     Imagin/14/24: Staging scans without evidence of disease    Pathology:    None since last visit.     Assessment:      Metastatic ER/AK negative, HER2 positive breast cancer diagnosed in 2014. S/p THPx6 and has been on maintenance herceptin/perjeta (phesgo) since 2015. On treatment holiday since 7/24/2024    Scans continue to show EARL. Patient preference to continue treatment break. Will plan for restaging in about 4 months.     Plan:    Surgical Plan: S/p L mastectomy and SLNBx in 2012  Additional biopsy: Not indicated  Radiation Plan: Not indicated  Additional staging scans/DEXA/echo: Recent staging scans reviewed. She will require echocardiogram every 3 months while receiving HER2 directed therapy managed by onco-cardiology.  Additional Path info (i.e Ki67, PDL1): N/A  Gene assays: Not indicated    Systemic treatment plan: Remains on maintenance herceptin/perjeta since 2015, now on phesgo Treatment break initiated this visit, last phesgo 7/24/24     Intent: Palliative/disease control   Clinical trial: N/A   Endocrine therapy: Not indicated   HER2 treatment: Remains on maintenance herceptin/perjeta since 2015, now on phesgo   Targeted agents: Not indicated   Chemotherapy: Received Docetaxel/Herceptin/Perjeta X6 in 2014   BMA: N/A    Access: Removed  Supportive meds: N/A  Genetic testing: Information given to patient. She has not had genetic testing, sister was negative. Referral in place.   Fertility preservation: Not indicated  Other active problems/orders:     Osteopenia: 5/2021 DEXA. Updated ordered today, will determine need for bone modifying agent. We discussed general recommendations for bone loss prevention which include 1729-0867 mg of calcium intake per day for adults >50yrs, and no history of renal calculi; 800-1000 IU of vitamin D3 per day for adults >50yrs, and no history of renal calculi. Weight bearing exercise. Discontinue smoking. Avoid excessive use of caffeine, soft drinks, and alcoholic beverages. In addition, balance training and fall prevention programs can help reduce the risk of fractures.     Enlarged  AAA: She follows with cardiology    Surveillance plan: Will order CT CAP in 4 months; She also gets yearly right breast Mammograms with breast surgery team.    Follow-up:  4 months  for scan review.     Patient expressed understanding of the plan outlined above. She had ample time to ask questions. She understands she can contact us should she have additional questions or issues arise in the interim.

## 2024-11-25 NOTE — PROGRESS NOTES
FUV completed. POC reviewed with pt. who verbalizes understanding per teach back method. Pt agreeable to call our office for any questions, issues, or concerns. Next FUV with _Dr. Dye in 4 months following surveillance scans ordered. Pt to scheduling prior to discharge from office.

## 2024-12-11 DIAGNOSIS — Z17.1 MALIGNANT NEOPLASM OF LEFT BREAST IN FEMALE, ESTROGEN RECEPTOR NEGATIVE, UNSPECIFIED SITE OF BREAST: ICD-10-CM

## 2024-12-11 DIAGNOSIS — C50.912 MALIGNANT NEOPLASM OF LEFT BREAST IN FEMALE, ESTROGEN RECEPTOR NEGATIVE, UNSPECIFIED SITE OF BREAST: ICD-10-CM

## 2024-12-14 NOTE — PROGRESS NOTES
Linda C Hegarty female   1945 79 y.o.   [id number]           HPI  Linda C Hegarty is a 79 y.o.   woman diagnosed  with left breast Paget's and DCIS. She had multifocal DCIS with microinvasion treated with mastectomy, -0/3 negative lymph nodes, ER TX negative. She had positive margins and required reexcision, negative for DCIS or microinvasion.  she was diagnosed with recurrence found palpable left neck lymph node. Metastatic workup showed metastatic disease left axilla, retrocrural clavicular suspicious early osteitis met and lung nodules. She had left axillary excision and left chest wall excision 2014 with metastatic ER/TX negative HER-2/duarte 3+ amplified. 2014 bronchoscopy showed malignant cells morphologically compatible with breast cancer, ER/TX negative HER-2 positive. Patient completed 6 cycles trastuzumab, pertuzumab and Docetaxel Metastatic ER/TX negative, HER2 positive breast cancer diagnosed in . S/p THPx6 and has been on maintenance herceptin/perjeta (phesgo) since . On treatment holiday since 2024     Diagnosis Date: 14-Mar-2012, Stage IA-- now Stage 4     BREAST IMAGIN2023 right diagnostic mammogram, BI-RADS Category 2. 11/15/2024 CT chest/abd/pelvis, no disease progression     FEMALE HISTORY: Menarche age 15, , first birth age 32 menopause age 44, HRT, heterogeneously dense tissue     FAMILY CANCER HISTORY:  Sister: breast cancer age 52, BRCA negative  Maternal aunt: Colon cancer (60s)  Paternal grandmother: Lymphoma  Paternal grandmother: liver cancer       REVIEW OF SYSTEMS    Constitutional:  Negative for appetite change, fatigue, fever and unexpected weight change.   HENT:  Negative for ear pain, hearing loss, nosebleeds, sore throat and trouble swallowing.    Eyes:  Negative for discharge, itching and visual disturbance.   Respiratory:  Negative for cough, chest tightness and shortness of breath.    Cardiovascular:  Negative for chest pain,  palpitations and leg swelling.   Breast: as indicated in HPI  Gastrointestinal:  Negative for abdominal pain, constipation, diarrhea and nausea.   Endocrine: Negative for cold intolerance and heat intolerance.   Genitourinary:  Negative for dysuria, frequency, hematuria, pelvic pain and vaginal bleeding.   Musculoskeletal:  Negative for arthralgias, back pain, gait problem, joint swelling and myalgias.   Skin:  Negative for color change and rash.   Allergic/Immunologic: Negative for environmental allergies and food allergies.   Neurological:  Negative for dizziness, tremors, speech difficulty, weakness, numbness and headaches.   Hematological:  Does not bruise/bleed easily.   Psychiatric/Behavioral:  Negative for agitation, dysphoric mood and sleep disturbance. The patient is not nervous/anxious.         MEDICATIONS  Current Outpatient Medications   Medication Instructions    amitriptyline (Elavil) 10 mg tablet TAKE ONE TABLET BY MOUTH DAILY AT BEDTIME; take in addition to 25mg tablet if needed    amoxicillin (AMOXIL) 500 mg, 3 times daily    ascorbic acid (VITAMIN C) 1,000 mg    aspirin 81 mg chewable tablet 1 tablet, Daily    atorvastatin (LIPITOR) 40 mg, oral, Nightly    calcium citrate (Calcitrate) 200 mg (950 mg) tablet 1 tablet, Daily    carvedilol (COREG) 3.125 mg, oral, 2 times daily (morning and late afternoon)    diphenhydrAMINE (BENADRYL) 25 mg, Nightly    empagliflozin (JARDIANCE) 10 mg, oral, Daily    famotidine (PEPCID) 40 mg, Daily    fexofenadine (ALLEGRA) 180 mg, Daily RT    Lactobacillus acidophilus (PROBIOTIC ORAL) Take by mouth. Chewable tablet    lansoprazole (PREVACID) 30 mg, 2 times daily    nitrofurantoin, macrocrystal-monohydrate, (Macrobid) 100 mg capsule TAKE 1 CAPSULE WITH FOOD BY MOUTH EVERY 12 HOURS FOR 5 DAYS    ondansetron (ZOFRAN) 4 mg, Every 8 hours PRN    ondansetron ODT (Zofran-ODT) 8 mg disintegrating tablet DISSOLVE ONE TABLET IN MOUTH THREE TIMES A DAY AS NEEDED    PERTUZUMAB  IV 1 Dose    pertuzumab-trastuzumab-hy-zzxf (PHESGO SUBQ) Inject under the skin.    Premarin vaginal cream INSERT 0.5 GRAMS VAGINALLY ONCE DAILY FOR 2 WEEKS  THEN DECREASE TO 0.5 GRAMS ONCE A  WEEK    ramipril (ALTACE) 2.5 mg, oral, Daily    sulfamethoxazole-trimethoprim (Bactrim) 400-80 mg tablet 0.5 tablets, Daily    TRASTUZUMAB IV 2 mg/kg        ALLERGIES  Allergies   Allergen Reactions    Codeine Unknown, Nausea/vomiting and Rash    Insects Extract Unknown    Meperidine Unknown and Confusion    Morphine Unknown and Confusion    Pollens Extract Other        Patient Active Problem List    Diagnosis Date Noted    Allergic rhinitis 02/07/2024    Back pain 02/07/2024    Galeano's esophagus without dysplasia 02/07/2024    Cough 02/07/2024    Gastro-esophageal reflux disease without esophagitis 02/07/2024    Migraines 02/07/2024    Numbness of face 02/07/2024    Other constipation 02/07/2024    Menopausal symptom 02/07/2024    Obstetric disorder of uterus ([facility]) 02/07/2024    Personal history of breast cancer 02/07/2024    Encounter for follow-up surveillance of breast cancer 12/11/2023    Cardiomyopathy 11/08/2023    Personal history of antineoplastic chemotherapy 11/08/2023    Aneurysm of the ascending aorta, without rupture (CMS-HCC) 09/27/2023    Atypical chest pain 08/22/2023    Breast cancer, female 08/22/2023    Diastolic dysfunction 08/22/2023    Aortic aneurysm ([facility]) 08/22/2023    Dilation of aorta (CMS-[facility]) 08/22/2023    Electrolyte imbalance 08/22/2023    Hyperlipidemia 08/22/2023    Hypertension 08/22/2023    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    Neuropathy 08/22/2023    Recurrent UTI 08/22/2023    Shortness of breath on exertion 08/22/2023    Recurrent urinary tract infection 08/22/2023    Dyspnea on exertion 08/22/2023    Estrogen receptor negative status (ER-) 08/22/2023    Other specified disorders of bone density and structure, unspecified site 08/04/2023    Person  consulting for explanation of examination or test findings 07/10/2023    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    Other nonspecific abnormal finding of lung field 2023    Diaphragmatic hernia without obstruction or gangrene 2023    Dysphagia, oropharyngeal phase 2023    Esophageal obstruction 2023    Change in bowel habit 2023    Other diseases of stomach and duodenum 2023    Family history of malignant neoplasm of breast 2023    Abdominal aortic aneurysm, without rupture, unspecified (CMS-HCC) 2023    Other long term (current) drug therapy 2023    Encounter for therapeutic drug level monitoring 2023    Malignant neoplasm of female breast 2023    Personal history of malignant neoplasm of bone 2023    Acquired absence of left breast and nipple 2023    Cardiomegaly 2023    Atherosclerosis of aorta (CMS-HCC) 2023    Encounter for antineoplastic immunotherapy 2023    Thrombosis due to vascular prosthetic devices, implants and grafts, initial encounter (CMS-HCC) 2023    History of malignant neoplasm of breast 2022    Nontraumatic tear of rotator cuff 2020    History of colonic polyps 2014     Past Medical History:   Diagnosis Date    Malignant neoplasm of nipple and areola, unspecified female breast (Multi)     Pagets disease, breast    Other abnormal and inconclusive findings on diagnostic imaging of breast 10/24/2017    Abnormal mammogram of right breast    Other conditions influencing health status 09/10/2014    History of pregnancy    Personal history of other complications of pregnancy, childbirth and the puerperium 09/10/2014    History of spontaneous     Personal history of urinary (tract) infections 09/10/2014    History of recurrent UTI (urinary tract infection)      Past Surgical History:   Procedure Laterality Date    BLADDER SURGERY   09/10/2014    Bladder Surgery    HYSTERECTOMY  09/10/2014    Hysterectomy    IR CVC PORT REMOVAL  10/16/2023    IR CVC PORT REMOVAL 10/16/2023 Emily Ray MD PAR ANGIO    LYMPH NODE BIOPSY  08/27/2014    Biopsy Lymph Node    MR HEAD ANGIO WO IV CONTRAST  12/26/2023    MR HEAD ANGIO WO IV CONTRAST 12/26/2023    OTHER SURGICAL HISTORY  08/27/2014    Radical Mastectomy Left Breast    OTHER SURGICAL HISTORY  04/21/2017    Central Intravenous Catheter    OTHER SURGICAL HISTORY  09/10/2014    Complete Colonoscopy Rectum      Family History   Problem Relation Name Age of Onset    Diabetes Mother      Heart failure Father      Breast cancer Sister      Diabetes Other aunt           SOCIAL HISTORY      Social History     Tobacco Use    Smoking status: Former     Types: Cigarettes     Passive exposure: Past    Smokeless tobacco: Never   Substance Use Topics    Alcohol use: Yes        VITALS  There were no vitals filed for this visit.     PHYSICAL EXAM  Patient is alert and oriented x3, with appropriate mood. The gait is steady and hand grasps are equal. Sclera clear. The left breast removed with healed mastectomy incision. The right breast tissue is soft without palpable abnormalities, discrete nodules or masses. The skin and nipple appear normal. There is no cervical, supraclavicular, or axillary lymphadenopathy palpable. Heart rate and rhythm normal, S1 and S2 appreciated. The lungs are clear bilaterally. Abdomen is soft & non-tender.       Physical Exam  Chest:              IMAGING    Time was spent viewing digital images of the radiology testing with the patient. I explained the results in depth, along with suggested explanation for follow up recommendations based on the testing results.          ORDERS  No orders of the defined types were placed in this encounter.         ASSESSMENT/PLAN  No diagnosis found.       No follow-ups on file.      Gabriella Wray, [facility]

## 2024-12-16 ENCOUNTER — APPOINTMENT (OUTPATIENT)
Dept: RADIOLOGY | Facility: CLINIC | Age: 79
End: 2024-12-16
Payer: MEDICARE

## 2024-12-16 ENCOUNTER — APPOINTMENT (OUTPATIENT)
Dept: SURGICAL ONCOLOGY | Facility: CLINIC | Age: 79
End: 2024-12-16
Payer: MEDICARE

## 2024-12-19 ENCOUNTER — OFFICE (OUTPATIENT)
Dept: URBAN - METROPOLITAN AREA CLINIC 27 | Facility: CLINIC | Age: 79
End: 2024-12-19
Payer: MEDICARE

## 2024-12-19 VITALS
WEIGHT: 123 LBS | HEART RATE: 95 BPM | SYSTOLIC BLOOD PRESSURE: 120 MMHG | DIASTOLIC BLOOD PRESSURE: 82 MMHG | HEIGHT: 63 IN

## 2024-12-19 DIAGNOSIS — K21.9 GASTRO-ESOPHAGEAL REFLUX DISEASE WITHOUT ESOPHAGITIS: ICD-10-CM

## 2024-12-19 DIAGNOSIS — Z80.0 FAMILY HISTORY OF MALIGNANT NEOPLASM OF DIGESTIVE ORGANS: ICD-10-CM

## 2024-12-19 DIAGNOSIS — K22.70 BARRETT'S ESOPHAGUS WITHOUT DYSPLASIA: ICD-10-CM

## 2024-12-19 DIAGNOSIS — K22.2 ESOPHAGEAL OBSTRUCTION: ICD-10-CM

## 2024-12-19 DIAGNOSIS — R19.8 OTHER SPECIFIED SYMPTOMS AND SIGNS INVOLVING THE DIGESTIVE S: ICD-10-CM

## 2024-12-19 DIAGNOSIS — R13.10 DYSPHAGIA, UNSPECIFIED: ICD-10-CM

## 2024-12-19 DIAGNOSIS — Z86.0101 PERSONAL HISTORY OF ADENOMATOUS AND SERRATED COLON POLYPS: ICD-10-CM

## 2024-12-19 PROCEDURE — 99214 OFFICE O/P EST MOD 30 MIN: CPT | Performed by: CLINICAL NURSE SPECIALIST

## 2024-12-31 VITALS
RESPIRATION RATE: 10 BRPM | HEART RATE: 79 BPM | RESPIRATION RATE: 22 BRPM | SYSTOLIC BLOOD PRESSURE: 117 MMHG | SYSTOLIC BLOOD PRESSURE: 117 MMHG | HEART RATE: 84 BPM | OXYGEN SATURATION: 92 % | DIASTOLIC BLOOD PRESSURE: 48 MMHG | DIASTOLIC BLOOD PRESSURE: 54 MMHG | SYSTOLIC BLOOD PRESSURE: 119 MMHG | RESPIRATION RATE: 13 BRPM | WEIGHT: 119 LBS | HEART RATE: 90 BPM | RESPIRATION RATE: 11 BRPM | DIASTOLIC BLOOD PRESSURE: 53 MMHG | RESPIRATION RATE: 10 BRPM | DIASTOLIC BLOOD PRESSURE: 48 MMHG | HEART RATE: 70 BPM | DIASTOLIC BLOOD PRESSURE: 65 MMHG | SYSTOLIC BLOOD PRESSURE: 151 MMHG | HEIGHT: 63 IN | DIASTOLIC BLOOD PRESSURE: 53 MMHG | WEIGHT: 119 LBS | OXYGEN SATURATION: 87 % | SYSTOLIC BLOOD PRESSURE: 119 MMHG | HEART RATE: 79 BPM | RESPIRATION RATE: 22 BRPM | DIASTOLIC BLOOD PRESSURE: 86 MMHG | HEART RATE: 70 BPM | HEART RATE: 78 BPM | SYSTOLIC BLOOD PRESSURE: 155 MMHG | TEMPERATURE: 97.2 F | OXYGEN SATURATION: 96 % | SYSTOLIC BLOOD PRESSURE: 122 MMHG | RESPIRATION RATE: 13 BRPM | SYSTOLIC BLOOD PRESSURE: 122 MMHG | HEART RATE: 90 BPM | DIASTOLIC BLOOD PRESSURE: 56 MMHG | HEART RATE: 94 BPM | OXYGEN SATURATION: 98 % | OXYGEN SATURATION: 89 % | SYSTOLIC BLOOD PRESSURE: 82 MMHG | DIASTOLIC BLOOD PRESSURE: 54 MMHG | DIASTOLIC BLOOD PRESSURE: 63 MMHG | DIASTOLIC BLOOD PRESSURE: 53 MMHG | SYSTOLIC BLOOD PRESSURE: 125 MMHG | DIASTOLIC BLOOD PRESSURE: 86 MMHG | OXYGEN SATURATION: 95 % | HEIGHT: 63 IN | HEART RATE: 94 BPM | OXYGEN SATURATION: 94 % | DIASTOLIC BLOOD PRESSURE: 65 MMHG | DIASTOLIC BLOOD PRESSURE: 71 MMHG | RESPIRATION RATE: 11 BRPM | SYSTOLIC BLOOD PRESSURE: 151 MMHG | TEMPERATURE: 97.2 F | DIASTOLIC BLOOD PRESSURE: 71 MMHG | OXYGEN SATURATION: 96 % | OXYGEN SATURATION: 97 % | RESPIRATION RATE: 10 BRPM | SYSTOLIC BLOOD PRESSURE: 125 MMHG | RESPIRATION RATE: 13 BRPM | DIASTOLIC BLOOD PRESSURE: 60 MMHG | HEART RATE: 74 BPM | WEIGHT: 119 LBS | DIASTOLIC BLOOD PRESSURE: 71 MMHG | OXYGEN SATURATION: 96 % | SYSTOLIC BLOOD PRESSURE: 155 MMHG | OXYGEN SATURATION: 92 % | SYSTOLIC BLOOD PRESSURE: 120 MMHG | HEART RATE: 79 BPM | OXYGEN SATURATION: 98 % | DIASTOLIC BLOOD PRESSURE: 40 MMHG | HEART RATE: 74 BPM | DIASTOLIC BLOOD PRESSURE: 66 MMHG | HEART RATE: 90 BPM | OXYGEN SATURATION: 99 % | DIASTOLIC BLOOD PRESSURE: 86 MMHG | OXYGEN SATURATION: 87 % | SYSTOLIC BLOOD PRESSURE: 134 MMHG | DIASTOLIC BLOOD PRESSURE: 66 MMHG | RESPIRATION RATE: 11 BRPM | DIASTOLIC BLOOD PRESSURE: 73 MMHG | HEART RATE: 74 BPM | OXYGEN SATURATION: 92 % | RESPIRATION RATE: 17 BRPM | OXYGEN SATURATION: 89 % | HEART RATE: 84 BPM | DIASTOLIC BLOOD PRESSURE: 56 MMHG | OXYGEN SATURATION: 97 % | RESPIRATION RATE: 22 BRPM | HEART RATE: 78 BPM | HEART RATE: 76 BPM | SYSTOLIC BLOOD PRESSURE: 120 MMHG | DIASTOLIC BLOOD PRESSURE: 48 MMHG | DIASTOLIC BLOOD PRESSURE: 65 MMHG | DIASTOLIC BLOOD PRESSURE: 66 MMHG | SYSTOLIC BLOOD PRESSURE: 78 MMHG | SYSTOLIC BLOOD PRESSURE: 82 MMHG | SYSTOLIC BLOOD PRESSURE: 82 MMHG | OXYGEN SATURATION: 99 % | SYSTOLIC BLOOD PRESSURE: 84 MMHG | SYSTOLIC BLOOD PRESSURE: 134 MMHG | OXYGEN SATURATION: 87 % | OXYGEN SATURATION: 99 % | SYSTOLIC BLOOD PRESSURE: 144 MMHG | DIASTOLIC BLOOD PRESSURE: 40 MMHG | SYSTOLIC BLOOD PRESSURE: 120 MMHG | OXYGEN SATURATION: 89 % | DIASTOLIC BLOOD PRESSURE: 63 MMHG | DIASTOLIC BLOOD PRESSURE: 56 MMHG | HEART RATE: 84 BPM | HEART RATE: 76 BPM | SYSTOLIC BLOOD PRESSURE: 144 MMHG | SYSTOLIC BLOOD PRESSURE: 155 MMHG | SYSTOLIC BLOOD PRESSURE: 84 MMHG | OXYGEN SATURATION: 95 % | SYSTOLIC BLOOD PRESSURE: 125 MMHG | OXYGEN SATURATION: 94 % | DIASTOLIC BLOOD PRESSURE: 63 MMHG | HEART RATE: 94 BPM | OXYGEN SATURATION: 95 % | HEART RATE: 76 BPM | HEART RATE: 78 BPM | SYSTOLIC BLOOD PRESSURE: 119 MMHG | RESPIRATION RATE: 17 BRPM | DIASTOLIC BLOOD PRESSURE: 40 MMHG | SYSTOLIC BLOOD PRESSURE: 117 MMHG | HEIGHT: 63 IN | OXYGEN SATURATION: 94 % | DIASTOLIC BLOOD PRESSURE: 73 MMHG | OXYGEN SATURATION: 98 % | SYSTOLIC BLOOD PRESSURE: 84 MMHG | OXYGEN SATURATION: 97 % | DIASTOLIC BLOOD PRESSURE: 60 MMHG | SYSTOLIC BLOOD PRESSURE: 122 MMHG | RESPIRATION RATE: 17 BRPM | SYSTOLIC BLOOD PRESSURE: 134 MMHG | SYSTOLIC BLOOD PRESSURE: 78 MMHG | DIASTOLIC BLOOD PRESSURE: 54 MMHG | SYSTOLIC BLOOD PRESSURE: 78 MMHG | SYSTOLIC BLOOD PRESSURE: 144 MMHG | DIASTOLIC BLOOD PRESSURE: 60 MMHG | DIASTOLIC BLOOD PRESSURE: 73 MMHG | SYSTOLIC BLOOD PRESSURE: 151 MMHG | HEART RATE: 70 BPM | TEMPERATURE: 97.2 F

## 2025-01-01 NOTE — PROGRESS NOTES
Subjective   Linda C Hegarty is a 79 y.o. female     Patient has a medical history of Paget's disease of the left breast.  Left breast mastectomy with sentinel lymph node biopsy performed March 2012.  Has been on Herceptin/Perjeta since 2015; was previously Phesgo (Pertuzumab/trastuzumab/hyaluronidase).    Echocardiogram dated October 2023 shows low normal ejection fraction on my review (45 to 50%).  Appears largely identical to echocardiogram dated March 2023.  Ascending aortic aneurysm measuring up to 4.3 cm noted on CT chest with contrast September 2023.  (Largely stable compared to scans dated January 2023 & October 2022).    On this visit patient denies any limitations with walking.  Denies any chest discomfort or shortness breath.  Denies any orthopnea/PND.  Has minimal lower extremity edema.   Since her last visit the patient has now been taken off the HER2/duarte targeted therapy (discontinued July 2024).  Subsequent echocardiogram from today shows slightly improved ejection fraction.  She continues to be on ramipril/Coreg/Jardiance.  Oncology notes future plan is surveillance only-not on active treatment right now.  She had a UTI around [place] with some orthostatic dizziness.        Current Outpatient Medications   Medication Instructions    amitriptyline (Elavil) 10 mg tablet TAKE ONE TABLET BY MOUTH DAILY AT BEDTIME; take in addition to 25mg tablet if needed    amitriptyline (ELAVIL) 25 mg, Nightly    amoxicillin (AMOXIL) 500 mg, 3 times daily    ascorbic acid (VITAMIN C) 1,000 mg    aspirin 81 mg chewable tablet 1 tablet, Daily    atorvastatin (LIPITOR) 40 mg, oral, Nightly    calcium citrate (Calcitrate) 200 mg (950 mg) tablet 1 tablet, Daily    carvedilol (COREG) 3.125 mg, oral, 2 times daily (morning and late afternoon)    diphenhydrAMINE (BENADRYL) 25 mg, Nightly    empagliflozin (JARDIANCE) 10 mg, oral, Daily    famotidine (PEPCID) 40 mg, Daily    fexofenadine (ALLEGRA) 180 mg, Daily RT     Lactobacillus acidophilus (PROBIOTIC ORAL) Take by mouth. Chewable tablet    lansoprazole (PREVACID) 30 mg, 2 times daily    nitrofurantoin, macrocrystal-monohydrate, (Macrobid) 100 mg capsule TAKE 1 CAPSULE WITH FOOD BY MOUTH EVERY 12 HOURS FOR 5 DAYS    ondansetron (ZOFRAN) 4 mg, Every 8 hours PRN    ondansetron ODT (Zofran-ODT) 8 mg disintegrating tablet DISSOLVE ONE TABLET IN MOUTH THREE TIMES A DAY AS NEEDED    PERTUZUMAB IV 1 Dose    pertuzumab-trastuzumab-hy-zzxf (PHESGO SUBQ) Inject under the skin.    Premarin vaginal cream INSERT 0.5 GRAMS VAGINALLY ONCE DAILY FOR 2 WEEKS  THEN DECREASE TO 0.5 GRAMS ONCE A  WEEK    psyllium (Metamucil) 0.52 gram capsule capsule    ramipril (ALTACE) 2.5 mg, oral, Daily    sulfamethoxazole-trimethoprim (Bactrim) 400-80 mg tablet 0.5 tablets, Daily    TRASTUZUMAB IV 2 mg/kg         Review of Systems  A comprehensive review of systems was negative.     Past Medical History:   Diagnosis Date    Malignant neoplasm of nipple and areola, unspecified female breast     Pagets disease, breast    Other abnormal and inconclusive findings on diagnostic imaging of breast 10/24/2017    Abnormal mammogram of right breast    Other conditions influencing health status 09/10/2014    History of pregnancy    Personal history of other complications of pregnancy, childbirth and the puerperium 09/10/2014    History of spontaneous     Personal history of urinary (tract) infections 09/10/2014    History of recurrent UTI (urinary tract infection)       Past Surgical History:   Procedure Laterality Date    BLADDER SURGERY  09/10/2014    Bladder Surgery    HYSTERECTOMY  09/10/2014    Hysterectomy    IR CVC PORT REMOVAL  10/16/2023    IR CVC PORT REMOVAL 10/16/2023 Emily Ray MD PAR ANGIO    LYMPH NODE BIOPSY  2014    Biopsy Lymph Node    MR HEAD ANGIO WO IV CONTRAST  2023    MR HEAD ANGIO WO IV CONTRAST 2023    OTHER SURGICAL HISTORY  2014    Radical Mastectomy  "Left Breast    OTHER SURGICAL HISTORY  04/21/2017    Central Intravenous Catheter    OTHER SURGICAL HISTORY  09/10/2014    Complete Colonoscopy Rectum       Allergies   Allergen Reactions    Codeine Unknown, Nausea/vomiting and Rash    Insects Extract Unknown    Meperidine Unknown and Confusion    Morphine Unknown and Confusion    Pollens Extract Other       Family History   Problem Relation Name Age of Onset    Diabetes Mother      Heart failure Father      Breast cancer Sister      Diabetes Other aunt        Objective   Visit Vitals  /62   Pulse 81   Ht 1.562 m (5' 1.5\")   Wt 54.5 kg (120 lb 3.2 oz)   SpO2 93%   BMI 22.34 kg/m²   OB Status Postmenopausal   Smoking Status Former   BSA 1.54 m²     General: awake, alert and oriented. No acute distress.   Skin: Skin is warm, dry and intact without rashes or lesions. Appropriate color for ethnicity. Nail beds pink with no cyanosis or clubbing  HEENT: normocephalic, atraumatic; conjunctivae are clear without exudates or hemorrhage. Sclera is non-icteric. Eyelids are normal in appearance without swelling or lesions. Hearing intact. Nares are patent bilaterally. Moist mucous membranes.   Cardiovascular: Regular. No murmurs, gallops, or rubs are auscultated. S1 and S2 are heard and are of normal intensity. No JVD, no carotid bruits  Respiratory: Thorax symmetric. CTAB, breath sounds vesicular. No crackles, wheezes or ronchi.   Gastrointestinal: soft, non-distended, BS + x 4  Genitourinary: exam deferred  Musculoskeletal: moves all extremities  Extremities: pulses palpable bilaterally; no swelling or erythema; no edema  Neurological: alert & oriented x 3; no focal deficits  Psychiatric: appropriate mood and affect      Lab Review   Lab Results   Component Value Date    HGB 12.4 11/12/2024    HGB 11.8 (L) 12/06/2023    HGB CANCELED 09/26/2023    HGB 12.3 09/25/2023    HGB 11.6 (L) 08/04/2023     11/12/2024    WBC 4.9 11/12/2024     11/12/2024    K 4.5 " 11/12/2024    CREATININE 1.12 (H) 11/12/2024    CREATININE 1.03 07/26/2024    CREATININE 1.16 (H) 12/06/2023    BUN 17 11/12/2024    CALCIUM 9.1 11/12/2024    BNP 30 05/01/2024    TROPHS 4 03/29/2023    LDLF 58 11/30/2021        Assessment/Plan   Patient has a medical history of Paget's disease of the left breast.  Left breast mastectomy with sentinel lymph node biopsy performed March 2012.  Has been on Herceptin/Perjeta since 2015; now on Phesgo (Pertuzumab/trastuzumab/hyaluronidase).    Serial echocardiograms including the 1 performed July 25, 2024 have shown low normal (45-50) ejection fraction.  No signs or symptoms to suggest the clinical syndrome of heart failure.  Since she has been off the HER2/duarte therapy her ejection fraction appears to be mildly improved to 50-55%  Ascending aortic aneurysm measuring up to 4.3 cm noted on CT chest with contrast September 2023.  (Largely stable compared to scans dated January 2023 & October 2022).    Plan:  -Monitoring cardiotoxic chemotherapy: Not on HER2/duarte targeted therapy now.  Notably her ejection fraction has improved since she does not appear to have the clinical syndrome of heart failure.  Moreover she has had a UTI with Jardiance; will discontinue Jardiance at this time.  -Continue ramipril 2.5 mg, Coreg 3.125 mg twice daily for cardioprotection  -Needs repeat CT evaluation of her cancer; will monitor her aneurysm based on those scans  -Blood pressure is well controlled  -Since part of her guideline directed medical therapy is being discontinued; follow-up in 3 months with a repeat echocardiogram    Lemuel Pack MD  Advanced Heart Failure/Transplant Cardiology  Cardio-Oncology  [facility]

## 2025-01-02 ENCOUNTER — HOSPITAL ENCOUNTER (OUTPATIENT)
Dept: CARDIOLOGY | Facility: HOSPITAL | Age: 80
Discharge: HOME | End: 2025-01-02
Payer: MEDICARE

## 2025-01-02 ENCOUNTER — APPOINTMENT (OUTPATIENT)
Dept: CARDIOLOGY | Facility: CLINIC | Age: 80
End: 2025-01-02
Payer: MEDICARE

## 2025-01-02 VITALS
DIASTOLIC BLOOD PRESSURE: 62 MMHG | HEIGHT: 62 IN | SYSTOLIC BLOOD PRESSURE: 110 MMHG | OXYGEN SATURATION: 93 % | WEIGHT: 120.2 LBS | BODY MASS INDEX: 22.12 KG/M2 | HEART RATE: 81 BPM

## 2025-01-02 VITALS
BODY MASS INDEX: 24.15 KG/M2 | HEIGHT: 60 IN | RESPIRATION RATE: 18 BRPM | HEART RATE: 92 BPM | DIASTOLIC BLOOD PRESSURE: 68 MMHG | SYSTOLIC BLOOD PRESSURE: 107 MMHG | OXYGEN SATURATION: 95 % | WEIGHT: 123 LBS

## 2025-01-02 DIAGNOSIS — I50.42 CHRONIC COMBINED SYSTOLIC AND DIASTOLIC HEART FAILURE: ICD-10-CM

## 2025-01-02 DIAGNOSIS — I10 ESSENTIAL HYPERTENSION: ICD-10-CM

## 2025-01-02 DIAGNOSIS — N39.0 URINARY TRACT INFECTION WITHOUT HEMATURIA, SITE UNSPECIFIED: Primary | ICD-10-CM

## 2025-01-02 DIAGNOSIS — I70.0 ATHEROSCLEROSIS OF AORTA (CMS-HCC): ICD-10-CM

## 2025-01-02 DIAGNOSIS — E78.2 MIXED HYPERLIPIDEMIA: ICD-10-CM

## 2025-01-02 DIAGNOSIS — Z51.11 ENCOUNTER FOR ANTINEOPLASTIC CHEMOTHERAPY: ICD-10-CM

## 2025-01-02 DIAGNOSIS — I77.819 DILATION OF AORTA (CMS-HCC): ICD-10-CM

## 2025-01-02 LAB
AORTIC VALVE MEAN GRADIENT: 3 MMHG
AORTIC VALVE PEAK VELOCITY: 1.16 M/S
AV PEAK GRADIENT: 5 MMHG
AVA (PEAK VEL): 2.11 CM2
AVA (VTI): 2.01 CM2
EJECTION FRACTION APICAL 4 CHAMBER: 66.9
EJECTION FRACTION: 62 %
LEFT ATRIUM VOLUME AREA LENGTH INDEX BSA: 9.6 ML/M2
LEFT VENTRICLE INTERNAL DIMENSION DIASTOLE: 4.14 CM (ref 3.5–6)
LEFT VENTRICULAR OUTFLOW TRACT DIAMETER: 1.7 CM
LV EJECTION FRACTION BIPLANE: 62 %
MITRAL VALVE E/A RATIO: 0.65
RIGHT VENTRICLE FREE WALL PEAK S': 12.3 CM/S
RIGHT VENTRICLE PEAK SYSTOLIC PRESSURE: 26 MMHG

## 2025-01-02 PROCEDURE — 76376 3D RENDER W/INTRP POSTPROCES: CPT | Performed by: STUDENT IN AN ORGANIZED HEALTH CARE EDUCATION/TRAINING PROGRAM

## 2025-01-02 PROCEDURE — 93321 DOPPLER ECHO F-UP/LMTD STD: CPT | Performed by: STUDENT IN AN ORGANIZED HEALTH CARE EDUCATION/TRAINING PROGRAM

## 2025-01-02 PROCEDURE — 3078F DIAST BP <80 MM HG: CPT | Performed by: STUDENT IN AN ORGANIZED HEALTH CARE EDUCATION/TRAINING PROGRAM

## 2025-01-02 PROCEDURE — 93308 TTE F-UP OR LMTD: CPT

## 2025-01-02 PROCEDURE — 93356 MYOCRD STRAIN IMG SPCKL TRCK: CPT | Performed by: STUDENT IN AN ORGANIZED HEALTH CARE EDUCATION/TRAINING PROGRAM

## 2025-01-02 PROCEDURE — G2211 COMPLEX E/M VISIT ADD ON: HCPCS | Performed by: STUDENT IN AN ORGANIZED HEALTH CARE EDUCATION/TRAINING PROGRAM

## 2025-01-02 PROCEDURE — 3074F SYST BP LT 130 MM HG: CPT | Performed by: STUDENT IN AN ORGANIZED HEALTH CARE EDUCATION/TRAINING PROGRAM

## 2025-01-02 PROCEDURE — 1159F MED LIST DOCD IN RCRD: CPT | Performed by: STUDENT IN AN ORGANIZED HEALTH CARE EDUCATION/TRAINING PROGRAM

## 2025-01-02 PROCEDURE — 93325 DOPPLER ECHO COLOR FLOW MAPG: CPT | Performed by: STUDENT IN AN ORGANIZED HEALTH CARE EDUCATION/TRAINING PROGRAM

## 2025-01-02 PROCEDURE — 99214 OFFICE O/P EST MOD 30 MIN: CPT | Performed by: STUDENT IN AN ORGANIZED HEALTH CARE EDUCATION/TRAINING PROGRAM

## 2025-01-02 PROCEDURE — 1036F TOBACCO NON-USER: CPT | Performed by: STUDENT IN AN ORGANIZED HEALTH CARE EDUCATION/TRAINING PROGRAM

## 2025-01-02 PROCEDURE — 93308 TTE F-UP OR LMTD: CPT | Performed by: STUDENT IN AN ORGANIZED HEALTH CARE EDUCATION/TRAINING PROGRAM

## 2025-01-02 PROCEDURE — 2500000004 HC RX 250 GENERAL PHARMACY W/ HCPCS (ALT 636 FOR OP/ED): Performed by: STUDENT IN AN ORGANIZED HEALTH CARE EDUCATION/TRAINING PROGRAM

## 2025-01-02 RX ORDER — CARVEDILOL 3.12 MG/1
3.12 TABLET ORAL
Qty: 180 TABLET | Refills: 3 | Status: SHIPPED | OUTPATIENT
Start: 2025-01-02 | End: 2026-01-02

## 2025-01-02 RX ORDER — AMITRIPTYLINE HYDROCHLORIDE 25 MG/1
25 TABLET, FILM COATED ORAL NIGHTLY
COMMUNITY

## 2025-01-02 RX ORDER — ATORVASTATIN CALCIUM 40 MG/1
40 TABLET, FILM COATED ORAL NIGHTLY
Qty: 90 TABLET | Refills: 3 | Status: SHIPPED | OUTPATIENT
Start: 2025-01-02 | End: 2026-01-02

## 2025-01-02 RX ORDER — BROMPHENIRAMINE MALEATE, DEXTROMETHORPHAN HBR, PHENYLEPHRINE HCL, DIPHENHYDRAMINE HCL, PHENYLEPHRINE HCL 0.52G
KIT ORAL
COMMUNITY

## 2025-01-02 RX ORDER — RAMIPRIL 2.5 MG/1
2.5 CAPSULE ORAL DAILY
Qty: 90 CAPSULE | Refills: 3 | Status: SHIPPED | OUTPATIENT
Start: 2025-01-02 | End: 2026-01-02

## 2025-01-02 RX ADMIN — PERFLUTREN 2 ML OF DILUTION: 6.52 INJECTION, SUSPENSION INTRAVENOUS at 10:27

## 2025-01-03 RX ORDER — HEPARIN 100 UNIT/ML
500 SYRINGE INTRAVENOUS AS NEEDED
OUTPATIENT
Start: 2025-01-03

## 2025-01-03 RX ORDER — HEPARIN SODIUM,PORCINE/PF 10 UNIT/ML
50 SYRINGE (ML) INTRAVENOUS AS NEEDED
OUTPATIENT
Start: 2025-01-03

## 2025-01-08 ENCOUNTER — OFFICE (OUTPATIENT)
Dept: URBAN - METROPOLITAN AREA PATHOLOGY 2 | Facility: PATHOLOGY | Age: 80
End: 2025-01-08

## 2025-01-08 ENCOUNTER — AMBULATORY SURGICAL CENTER (OUTPATIENT)
Dept: URBAN - METROPOLITAN AREA SURGERY 12 | Facility: SURGERY | Age: 80
End: 2025-01-08

## 2025-01-08 DIAGNOSIS — K44.9 DIAPHRAGMATIC HERNIA WITHOUT OBSTRUCTION OR GANGRENE: ICD-10-CM

## 2025-01-08 DIAGNOSIS — K22.2 ESOPHAGEAL OBSTRUCTION: ICD-10-CM

## 2025-01-08 DIAGNOSIS — R13.10 DYSPHAGIA, UNSPECIFIED: ICD-10-CM

## 2025-01-08 DIAGNOSIS — K22.89 OTHER SPECIFIED DISEASE OF ESOPHAGUS: ICD-10-CM

## 2025-01-08 DIAGNOSIS — K31.89 OTHER DISEASES OF STOMACH AND DUODENUM: ICD-10-CM

## 2025-01-08 DIAGNOSIS — B37.81 CANDIDAL ESOPHAGITIS: ICD-10-CM

## 2025-01-08 DIAGNOSIS — K21.00 GASTRO-ESOPHAGEAL REFLUX DISEASE WITH ESOPHAGITIS, WITHOUT B: ICD-10-CM

## 2025-01-08 DIAGNOSIS — K21.9 GASTRO-ESOPHAGEAL REFLUX DISEASE WITHOUT ESOPHAGITIS: ICD-10-CM

## 2025-01-08 PROCEDURE — 88313 SPECIAL STAINS GROUP 2: CPT | Performed by: PATHOLOGY

## 2025-01-08 PROCEDURE — 43450 DILATE ESOPHAGUS 1/MULT PASS: CPT | Performed by: INTERNAL MEDICINE

## 2025-01-08 PROCEDURE — 43239 EGD BIOPSY SINGLE/MULTIPLE: CPT | Performed by: INTERNAL MEDICINE

## 2025-01-08 PROCEDURE — 88342 IMHCHEM/IMCYTCHM 1ST ANTB: CPT | Performed by: PATHOLOGY

## 2025-01-08 PROCEDURE — 88312 SPECIAL STAINS GROUP 1: CPT | Performed by: PATHOLOGY

## 2025-01-08 PROCEDURE — 88305 TISSUE EXAM BY PATHOLOGIST: CPT | Performed by: PATHOLOGY

## 2025-01-08 RX ORDER — NYSTATIN 100000 [USP'U]/ML
SUSPENSION ORAL
Qty: 420 | Refills: 0 | Status: ACTIVE
Start: 2025-01-08

## 2025-01-23 ENCOUNTER — APPOINTMENT (OUTPATIENT)
Dept: CARDIOLOGY | Facility: CLINIC | Age: 80
End: 2025-01-23
Payer: MEDICARE

## 2025-01-23 ENCOUNTER — APPOINTMENT (OUTPATIENT)
Dept: CARDIOLOGY | Facility: HOSPITAL | Age: 80
End: 2025-01-23
Payer: MEDICARE

## 2025-01-30 ENCOUNTER — APPOINTMENT (OUTPATIENT)
Dept: CARDIOLOGY | Facility: CLINIC | Age: 80
End: 2025-01-30
Payer: MEDICARE

## 2025-02-27 DIAGNOSIS — C50.912 MALIGNANT NEOPLASM OF LEFT BREAST IN FEMALE, ESTROGEN RECEPTOR NEGATIVE, UNSPECIFIED SITE OF BREAST: Primary | ICD-10-CM

## 2025-02-27 DIAGNOSIS — Z17.1 MALIGNANT NEOPLASM OF LEFT BREAST IN FEMALE, ESTROGEN RECEPTOR NEGATIVE, UNSPECIFIED SITE OF BREAST: Primary | ICD-10-CM

## 2025-03-03 ENCOUNTER — LAB (OUTPATIENT)
Dept: LAB | Facility: CLINIC | Age: 80
End: 2025-03-03
Payer: MEDICARE

## 2025-03-03 DIAGNOSIS — Z17.1 MALIGNANT NEOPLASM OF LEFT BREAST IN FEMALE, ESTROGEN RECEPTOR NEGATIVE, UNSPECIFIED SITE OF BREAST: ICD-10-CM

## 2025-03-03 DIAGNOSIS — C50.912 MALIGNANT NEOPLASM OF LEFT BREAST IN FEMALE, ESTROGEN RECEPTOR NEGATIVE, UNSPECIFIED SITE OF BREAST: ICD-10-CM

## 2025-03-03 PROCEDURE — 80053 COMPREHEN METABOLIC PANEL: CPT

## 2025-03-03 PROCEDURE — 36415 COLL VENOUS BLD VENIPUNCTURE: CPT

## 2025-03-04 LAB
ALBUMIN SERPL BCP-MCNC: 4.2 G/DL (ref 3.4–5)
ALP SERPL-CCNC: 84 U/L (ref 33–136)
ALT SERPL W P-5'-P-CCNC: 14 U/L (ref 7–45)
ANION GAP SERPL CALC-SCNC: 13 MMOL/L (ref 10–20)
AST SERPL W P-5'-P-CCNC: 22 U/L (ref 9–39)
BILIRUB SERPL-MCNC: 0.6 MG/DL (ref 0–1.2)
BUN SERPL-MCNC: 20 MG/DL (ref 6–23)
CALCIUM SERPL-MCNC: 9.3 MG/DL (ref 8.6–10.6)
CHLORIDE SERPL-SCNC: 102 MMOL/L (ref 98–107)
CO2 SERPL-SCNC: 27 MMOL/L (ref 21–32)
CREAT SERPL-MCNC: 1 MG/DL (ref 0.5–1.05)
EGFRCR SERPLBLD CKD-EPI 2021: 58 ML/MIN/1.73M*2
GLUCOSE SERPL-MCNC: 138 MG/DL (ref 74–99)
POTASSIUM SERPL-SCNC: 4.1 MMOL/L (ref 3.5–5.3)
PROT SERPL-MCNC: 6.5 G/DL (ref 6.4–8.2)
SODIUM SERPL-SCNC: 138 MMOL/L (ref 136–145)

## 2025-03-06 NOTE — PROGRESS NOTES
Linda C Hegarty female   1945 79 y.o.   [id number]      Chief Complaint    Follow-up          HPI  Linda C Hegarty is a 79 y.o.  woman diagnosed  with left breast Paget's and DCIS. 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 She had positive margins and required reexcision, negative for DCIS or microinvasion.  she was diagnosed with recurrence found palpable left neck lymph node. Metastatic workup showed metastatic disease left axilla, retrocrural clavicular suspicious early osteitis met and lung nodules. She had left axillary excision and left chest wall excision 2014 with metastatic ER/AK negative HER-2/duarte 3+ amplified. 2014 bronchoscopy showed malignant cells morphologically compatible with breast cancer, ER/AK negative HER-2 positive. She started first line systemic chemotherapy treatment with trastuzumab, pertuzumab, and docetaxel on 2014 and completed 6 cycles with a CR by imaging.  Herceptin/Perjeta monotherapy started 2015 ([facility]). She is currently on a treatment break since 2024 with [facility].   Diagnosis Date: 14-Mar-2012, Stage IA-- progressed to Stage 4     IMAGIN2023 right diagnostic mammogram, BI-RADS Category 2.   2024  bone scan, no evidence of new bone mets.   2024 CT chest/abd/pelvis, no disease progression     FEMALE HISTORY: Menarche age 15, , first birth age 32 menopause age 44, HRT, extremely dense tissue     FAMILY CANCER HISTORY:  Sister: breast cancer age 52, BRCA negative  Maternal aunt: Colon cancer (60s)  Paternal grandmother: Lymphoma  Paternal grandmother: liver cancer       REVIEW OF SYSTEMS    Constitutional:  Negative for appetite change, fatigue, fever and unexpected weight change.   HENT:  Negative for ear pain, hearing loss, nosebleeds, sore throat and trouble swallowing.    Eyes:  Negative for discharge, itching and visual disturbance.    Respiratory:  Negative for cough, chest tightness and shortness of breath.    Cardiovascular:  Negative for chest pain, palpitations and leg swelling.   Breast: as indicated in HPI  Gastrointestinal:  Negative for abdominal pain, constipation, diarrhea and nausea.   Endocrine: Negative for cold intolerance and heat intolerance.   Genitourinary:  Negative for dysuria, frequency, hematuria, pelvic pain and vaginal bleeding.   Musculoskeletal:  Negative for arthralgias, back pain, gait problem, joint swelling and myalgias.   Skin:  Negative for color change and rash.   Allergic/Immunologic: Negative for environmental allergies and food allergies.   Neurological:  Negative for dizziness, tremors, speech difficulty, weakness, numbness and headaches.   Hematological:  Does not bruise/bleed easily.   Psychiatric/Behavioral:  Negative for agitation, dysphoric mood and sleep disturbance. The patient is not nervous/anxious.         MEDICATIONS  Current Outpatient Medications   Medication Instructions    amitriptyline (Elavil) 10 mg tablet TAKE ONE TABLET BY MOUTH DAILY AT BEDTIME; take in addition to 25mg tablet if needed    amitriptyline (ELAVIL) 25 mg, Nightly    amoxicillin (AMOXIL) 500 mg, 3 times daily    ascorbic acid (VITAMIN C) 1,000 mg    aspirin 81 mg chewable tablet 1 tablet, Daily    atorvastatin (LIPITOR) 40 mg, oral, Nightly    calcium citrate (Calcitrate) 200 mg (950 mg) tablet 1 tablet, Daily    carvedilol (COREG) 3.125 mg, oral, 2 times daily (morning and late afternoon)    diphenhydrAMINE (BENADRYL) 25 mg, Nightly    famotidine (PEPCID) 40 mg, Daily    fexofenadine (ALLEGRA) 180 mg, Daily RT    Lactobacillus acidophilus (PROBIOTIC ORAL) Take by mouth. Chewable tablet    lansoprazole (PREVACID) 30 mg, 2 times daily    nitrofurantoin, macrocrystal-monohydrate, (Macrobid) 100 mg capsule TAKE 1 CAPSULE WITH FOOD BY MOUTH EVERY 12 HOURS FOR 5 DAYS    ondansetron (ZOFRAN) 4 mg, Every 8 hours PRN    ondansetron  ODT (Zofran-ODT) 8 mg disintegrating tablet DISSOLVE ONE TABLET IN MOUTH THREE TIMES A DAY AS NEEDED    PERTUZUMAB IV 1 Dose    pertuzumab-trastuzumab-hy-zzxf (PHESGO SUBQ) Inject under the skin.    Premarin vaginal cream INSERT 0.5 GRAMS VAGINALLY ONCE DAILY FOR 2 WEEKS  THEN DECREASE TO 0.5 GRAMS ONCE A  WEEK    psyllium (Metamucil) 0.52 gram capsule capsule    ramipril (ALTACE) 2.5 mg, oral, Daily    sulfamethoxazole-trimethoprim (Bactrim) 400-80 mg tablet 0.5 tablets, Daily    TRASTUZUMAB IV 2 mg/kg        ALLERGIES  Allergies   Allergen Reactions    Codeine Unknown, Nausea/vomiting and Rash    Insects Extract Unknown    Meperidine Unknown and Confusion    Morphine Unknown and Confusion    Pollens Extract Other        Patient Active Problem List    Diagnosis Date Noted    Allergic rhinitis 02/07/2024    Back pain 02/07/2024    Galenao's esophagus without dysplasia 02/07/2024    Cough 02/07/2024    Gastro-esophageal reflux disease without esophagitis 02/07/2024    Migraines 02/07/2024    Numbness of face 02/07/2024    Other constipation 02/07/2024    Menopausal symptom 02/07/2024    Obstetric disorder of uterus ([facility]) 02/07/2024    Personal history of breast cancer 02/07/2024    Encounter for follow-up surveillance of breast cancer 12/11/2023    Cardiomyopathy 11/08/2023    Personal history of antineoplastic chemotherapy 11/08/2023    Aneurysm of the ascending aorta, without rupture (CMS-HCC) 09/27/2023    Atypical chest pain 08/22/2023    Breast cancer, female 08/22/2023    Diastolic dysfunction 08/22/2023    Aortic aneurysm (CMS-[facility]) 08/22/2023    Dilation of aorta (CMS-[facility]) 08/22/2023    Electrolyte imbalance 08/22/2023    Hyperlipidemia 08/22/2023    Hypertension 08/22/2023    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    Neuropathy 08/22/2023    Recurrent UTI 08/22/2023    Shortness of breath on exertion 08/22/2023    Recurrent urinary tract infection 08/22/2023    Dyspnea on exertion  2023    Estrogen receptor negative status (ER-) 2023    Other specified disorders of bone density and structure, unspecified site 2023    Person consulting for explanation of examination or test findings 07/10/2023    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    Other nonspecific abnormal finding of lung field 2023    Diaphragmatic hernia without obstruction or gangrene 2023    Dysphagia, oropharyngeal phase 2023    Esophageal obstruction 2023    Change in bowel habit 2023    Other diseases of stomach and duodenum 2023    Family history of malignant neoplasm of breast 2023    Abdominal aortic aneurysm, without rupture, unspecified (CMS-HCC) 2023    Other long term (current) drug therapy 2023    Encounter for therapeutic drug level monitoring 2023    Malignant neoplasm of female breast 2023    Personal history of malignant neoplasm of bone 2023    Acquired absence of left breast and nipple 2023    Cardiomegaly 2023    Atherosclerosis of aorta (CMS-HCC) 2023    Encounter for antineoplastic immunotherapy 2023    Thrombosis due to vascular prosthetic devices, implants and grafts, initial encounter (CMS-HCC) 2023    History of malignant neoplasm of breast 2022    Nontraumatic tear of rotator cuff 2020    History of colonic polyps 2014     Past Medical History:   Diagnosis Date    Malignant neoplasm of nipple and areola, unspecified female breast     Pagets disease, breast    Other abnormal and inconclusive findings on diagnostic imaging of breast 10/24/2017    Abnormal mammogram of right breast    Other conditions influencing health status 09/10/2014    History of pregnancy    Personal history of other complications of pregnancy, childbirth and the puerperium 09/10/2014    History of spontaneous     Personal history of urinary (tract)  infections 09/10/2014    History of recurrent UTI (urinary tract infection)      Past Surgical History:   Procedure Laterality Date    BLADDER SURGERY  09/10/2014    Bladder Surgery    HYSTERECTOMY  09/10/2014    Hysterectomy    IR CVC PORT REMOVAL  10/16/2023    IR CVC PORT REMOVAL 10/16/2023 Emily Ray MD PAR ANGIO    LYMPH NODE BIOPSY  08/27/2014    Biopsy Lymph Node    MR HEAD ANGIO WO IV CONTRAST  12/26/2023    MR HEAD ANGIO WO IV CONTRAST 12/26/2023    OTHER SURGICAL HISTORY  08/27/2014    Radical Mastectomy Left Breast    OTHER SURGICAL HISTORY  04/21/2017    Central Intravenous Catheter    OTHER SURGICAL HISTORY  09/10/2014    Complete Colonoscopy Rectum      Family History   Problem Relation Name Age of Onset    Diabetes Mother      Heart failure Father      Breast cancer Sister      Diabetes Other aunt           SOCIAL HISTORY      Social History     Tobacco Use    Smoking status: Former     Types: Cigarettes     Passive exposure: Past    Smokeless tobacco: Never   Substance Use Topics    Alcohol use: Yes        VITALS  Vitals:    03/10/25 1328   BP: 101/69   Pulse: 69   Temp: 35.8 °C (96.4 °F)   SpO2: 97%        PHYSICAL EXAM  Patient is alert and oriented x3, with appropriate mood. The gait is steady and hand grasps are equal. Sclera clear. The left breast removed with healed mastectomy incision. The right breast tissue is soft without palpable abnormalities, discrete nodules or masses. The skin and nipple appear normal. There is no cervical, supraclavicular, or axillary lymphadenopathy palpable. Heart rate and rhythm normal, S1 and S2 appreciated. The lungs are clear bilaterally. Abdomen is soft & non-tender.     Physical Exam  Chest:              IMAGING  Right screening mammogram, results are pending.   Time was spent viewing digital images of the radiology testing with the patient.       ORDERS  Orders Placed This Encounter   Procedures    BI mammo right screening tomosynthesis      Standing Status:   Future     Standing Expiration Date:   5/6/2026     Order Specific Question:   Reason for exam:     Answer:   screening mammogram     Order Specific Question:   Radiologist to Determine Optimal Study     Answer:   Yes     Order Specific Question:   [facility]     Answer:   Immediate [1]     Order Specific Question:   Is this exam part of a Research Study? If Yes, link this order to the research study     Answer:   No          ASSESSMENT/PLAN  1. Encounter for follow-up surveillance of breast cancer  BI mammo right screening tomosynthesis    Clinic Appointment Request      2. Encounter for screening mammogram for malignant neoplasm of breast  BI mammo right screening tomosynthesis    Clinic Appointment Request             Follow up in about 1 year (around 3/10/2026) for with or after recommended imaging. She wants Mammogram local and will follow up with Med Onc.      Gabriella Wray, [facility]

## 2025-03-10 ENCOUNTER — HOSPITAL ENCOUNTER (OUTPATIENT)
Dept: RADIOLOGY | Facility: CLINIC | Age: 80
Discharge: HOME | End: 2025-03-10
Payer: MEDICARE

## 2025-03-10 ENCOUNTER — OFFICE VISIT (OUTPATIENT)
Dept: SURGICAL ONCOLOGY | Facility: CLINIC | Age: 80
End: 2025-03-10
Payer: MEDICARE

## 2025-03-10 VITALS
WEIGHT: 123 LBS | HEART RATE: 69 BPM | BODY MASS INDEX: 22.86 KG/M2 | DIASTOLIC BLOOD PRESSURE: 69 MMHG | SYSTOLIC BLOOD PRESSURE: 101 MMHG | TEMPERATURE: 96.4 F | OXYGEN SATURATION: 97 %

## 2025-03-10 DIAGNOSIS — Z12.31 ENCOUNTER FOR SCREENING MAMMOGRAM FOR MALIGNANT NEOPLASM OF BREAST: ICD-10-CM

## 2025-03-10 DIAGNOSIS — C50.912 MALIGNANT NEOPLASM OF LEFT BREAST IN FEMALE, ESTROGEN RECEPTOR NEGATIVE, UNSPECIFIED SITE OF BREAST: ICD-10-CM

## 2025-03-10 DIAGNOSIS — Z08 ENCOUNTER FOR FOLLOW-UP SURVEILLANCE OF BREAST CANCER: ICD-10-CM

## 2025-03-10 DIAGNOSIS — Z85.3 ENCOUNTER FOR FOLLOW-UP SURVEILLANCE OF BREAST CANCER: Primary | ICD-10-CM

## 2025-03-10 DIAGNOSIS — Z17.1 MALIGNANT NEOPLASM OF LEFT BREAST IN FEMALE, ESTROGEN RECEPTOR NEGATIVE, UNSPECIFIED SITE OF BREAST: ICD-10-CM

## 2025-03-10 DIAGNOSIS — Z12.31 BREAST CANCER SCREENING BY MAMMOGRAM: ICD-10-CM

## 2025-03-10 DIAGNOSIS — Z08 ENCOUNTER FOR FOLLOW-UP SURVEILLANCE OF BREAST CANCER: Primary | ICD-10-CM

## 2025-03-10 DIAGNOSIS — Z85.3 ENCOUNTER FOR FOLLOW-UP SURVEILLANCE OF BREAST CANCER: ICD-10-CM

## 2025-03-10 PROCEDURE — 99214 OFFICE O/P EST MOD 30 MIN: CPT | Mod: 25 | Performed by: NURSE PRACTITIONER

## 2025-03-10 PROCEDURE — 1126F AMNT PAIN NOTED NONE PRSNT: CPT | Performed by: NURSE PRACTITIONER

## 2025-03-10 PROCEDURE — 99214 OFFICE O/P EST MOD 30 MIN: CPT | Performed by: NURSE PRACTITIONER

## 2025-03-10 PROCEDURE — 77063 BREAST TOMOSYNTHESIS BI: CPT | Mod: RIGHT SIDE | Performed by: STUDENT IN AN ORGANIZED HEALTH CARE EDUCATION/TRAINING PROGRAM

## 2025-03-10 PROCEDURE — 1036F TOBACCO NON-USER: CPT | Performed by: NURSE PRACTITIONER

## 2025-03-10 PROCEDURE — 3074F SYST BP LT 130 MM HG: CPT | Performed by: NURSE PRACTITIONER

## 2025-03-10 PROCEDURE — 2550000001 HC RX 255 CONTRASTS: Performed by: STUDENT IN AN ORGANIZED HEALTH CARE EDUCATION/TRAINING PROGRAM

## 2025-03-10 PROCEDURE — 74177 CT ABD & PELVIS W/CONTRAST: CPT

## 2025-03-10 PROCEDURE — 77067 SCR MAMMO BI INCL CAD: CPT | Mod: RIGHT SIDE | Performed by: STUDENT IN AN ORGANIZED HEALTH CARE EDUCATION/TRAINING PROGRAM

## 2025-03-10 PROCEDURE — 1159F MED LIST DOCD IN RCRD: CPT | Performed by: NURSE PRACTITIONER

## 2025-03-10 PROCEDURE — 77061 BREAST TOMOSYNTHESIS UNI: CPT | Mod: RT

## 2025-03-10 PROCEDURE — 1160F RVW MEDS BY RX/DR IN RCRD: CPT | Performed by: NURSE PRACTITIONER

## 2025-03-10 PROCEDURE — 3078F DIAST BP <80 MM HG: CPT | Performed by: NURSE PRACTITIONER

## 2025-03-10 RX ADMIN — IOHEXOL 70 ML: 350 INJECTION, SOLUTION INTRAVENOUS at 11:31

## 2025-03-10 ASSESSMENT — PATIENT HEALTH QUESTIONNAIRE - PHQ9
SUM OF ALL RESPONSES TO PHQ9 QUESTIONS 1 & 2: 0
2. FEELING DOWN, DEPRESSED OR HOPELESS: NOT AT ALL
1. LITTLE INTEREST OR PLEASURE IN DOING THINGS: NOT AT ALL

## 2025-03-10 ASSESSMENT — PAIN SCALES - GENERAL: PAINLEVEL_OUTOF10: 0-NO PAIN

## 2025-03-17 ENCOUNTER — OFFICE VISIT (OUTPATIENT)
Dept: HEMATOLOGY/ONCOLOGY | Facility: CLINIC | Age: 80
End: 2025-03-17
Payer: MEDICARE

## 2025-03-17 VITALS
HEART RATE: 91 BPM | OXYGEN SATURATION: 96 % | DIASTOLIC BLOOD PRESSURE: 95 MMHG | BODY MASS INDEX: 22.91 KG/M2 | WEIGHT: 123.24 LBS | SYSTOLIC BLOOD PRESSURE: 150 MMHG | TEMPERATURE: 96.6 F | RESPIRATION RATE: 16 BRPM

## 2025-03-17 DIAGNOSIS — C50.912 MALIGNANT NEOPLASM OF LEFT BREAST IN FEMALE, ESTROGEN RECEPTOR NEGATIVE, UNSPECIFIED SITE OF BREAST: ICD-10-CM

## 2025-03-17 DIAGNOSIS — Z17.1 MALIGNANT NEOPLASM OF LEFT BREAST IN FEMALE, ESTROGEN RECEPTOR NEGATIVE, UNSPECIFIED SITE OF BREAST: ICD-10-CM

## 2025-03-17 PROCEDURE — 1159F MED LIST DOCD IN RCRD: CPT | Performed by: STUDENT IN AN ORGANIZED HEALTH CARE EDUCATION/TRAINING PROGRAM

## 2025-03-17 PROCEDURE — 3080F DIAST BP >= 90 MM HG: CPT | Performed by: STUDENT IN AN ORGANIZED HEALTH CARE EDUCATION/TRAINING PROGRAM

## 2025-03-17 PROCEDURE — 1126F AMNT PAIN NOTED NONE PRSNT: CPT | Performed by: STUDENT IN AN ORGANIZED HEALTH CARE EDUCATION/TRAINING PROGRAM

## 2025-03-17 PROCEDURE — 99215 OFFICE O/P EST HI 40 MIN: CPT | Performed by: STUDENT IN AN ORGANIZED HEALTH CARE EDUCATION/TRAINING PROGRAM

## 2025-03-17 PROCEDURE — 3077F SYST BP >= 140 MM HG: CPT | Performed by: STUDENT IN AN ORGANIZED HEALTH CARE EDUCATION/TRAINING PROGRAM

## 2025-03-17 ASSESSMENT — ENCOUNTER SYMPTOMS
ABDOMINAL PAIN: 0
UNEXPECTED WEIGHT CHANGE: 0
DIFFICULTY URINATING: 0
DIZZINESS: 0
APPETITE CHANGE: 0
VOMITING: 0
DIARRHEA: 0
BACK PAIN: 0
COUGH: 0
CONFUSION: 0
ADENOPATHY: 0
HEADACHES: 0
CHILLS: 0
HOT FLASHES: 0
FEVER: 0
CONSTIPATION: 0
NUMBNESS: 0
NAUSEA: 0
ARTHRALGIAS: 0
LEG SWELLING: 0
HEMATURIA: 0
SHORTNESS OF BREATH: 0
DYSURIA: 0
FATIGUE: 0

## 2025-03-17 ASSESSMENT — PAIN SCALES - GENERAL: PAINLEVEL_OUTOF10: 0-NO PAIN

## 2025-03-17 NOTE — PROGRESS NOTES
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 The final pathology showed scattered ducts in the superolateral segment of the breast involved by high grade ductal carcinoma in situ with multifocal areas of microinvasive carcinoma. DCIS and microinvasive tumor approaches to within 1 mm of the superior lateral margin. The DCIS and microinvasive were ER and MN negative. She also had 0 of 3 lymph nodes which were negative for malignancy. She then had a re-excision which was negative for residual DCIS or microinvasive disease. She had no adjuvant therapy.    She did well until 2014 when she self palpated L neck nodes. A CT neck with contrast was performed on 7/30/14 which showed enlarged and abnormally enhancing left posterior cervical space lymph node near the left subclavicular space. Smaller left posterior cervical space lymph nodes are also noted. A CT chest with contrast was also performed on 7/30/14 which showed new left axillary and retroclavicular adenopathy suspicious for metastatic disease, a new subtle 5 mm focus  in the T12 vertebral body suspicious for early osseous mets and a new 2 to 3 mm lung nodule in the right lower lobe. She had a PET/CT on 8/7/14 which showed intensely hypermetabolic lower cervical and thoracic adenopathy suspicious for recurrent/metastatic breast cancer. Also an intensely hypermetabolic subcarinal lymph node was seen with a max SUV of 8 suspicious for metastasis. She was seen by Dr. Fernando Paris on 8/11/14 and he performed an excisional biopsy of the left axillary lymph node as well as a left chest wall mass on 8/14.     The left axillary lymph node showed metastatic carcinoma with extracapsular extension which was ER <1%, MN 0% and HER2 3+ by IHC.    A bronchoscopy was performed on 9/2/14 which  showed malignant cells derived from adenocarcinoma, morphologically compatible with the patient's known history of breast cancer which were ER/HI negative by IHC and HER2 positive (3+ by IHC)     Patient started first line systemic chemotherapy treatment with trastuzumab, pertuzumab, and docetaxel on 2014 and completed 6 cycles with a CR by imaging.    Herceptin/Perjeta monotherapy started 2015    Treatment breast initiated 2024    GYN History/Pertinent Family history::  Sister diagnosed with breast cancer at age 52 who underwent genetic testing and was negative for BRCA1/BRCA2 per patient.     Maternal aunt with colon cancer diagnosed in her 60s.     Maternal grandmother diagnosed with lymphoma and paternal grandmother diagnosed with liver cancer.    Gyn History: 1st period at age 15. 1st pregnancy at age 32. . Last menstrual period in        Subjective: Interval History  Patient presents today for follow-up visit.     Denies interval events since last follow-up- no recent hospitalizations, new medical diagnoses, or new medications.     Itching she previously had has completely resolved. Feels in hindsight was related to phesgo.     Has upcoming apt with new cardiologist.     Her last colonoscopy she believes was a year ago. She sees Dr. Weaver. She has routine follow-up with him in July.     Denies abd pain, nausea, vomiting or changes in her bowels.     Denies weight loss, changes in the breast and/or chest wall, new aches or pains, changes in appetite or energy level. No current concerns at this time.     ROS:     Review of Systems   Constitutional:  Negative for appetite change, chills, fatigue, fever and unexpected weight change.   HENT:   Negative for mouth sores.    Respiratory:  Negative for cough and shortness of breath.    Cardiovascular:  Negative for chest pain and leg swelling.   Gastrointestinal:  Negative for abdominal pain, constipation, diarrhea, nausea and vomiting.    Endocrine: Negative for hot flashes.   Genitourinary:  Negative for difficulty urinating, dysuria and hematuria.    Musculoskeletal:  Negative for arthralgias and back pain.   Skin:  Negative for rash.   Neurological:  Negative for dizziness, headaches and numbness.   Hematological:  Negative for adenopathy.   Psychiatric/Behavioral:  Negative for confusion.         Physical Examination:    BP (!) 150/95   Pulse 91   Temp 35.9 °C (96.6 °F)   Resp 16   Wt 55.9 kg (123 lb 3.8 oz)   SpO2 96%   BMI 22.91 kg/m²     Physical Exam  Vitals and nursing note reviewed.   Constitutional:       General: She is not in acute distress.     Appearance: Normal appearance. She is not toxic-appearing.   HENT:      Head: Normocephalic and atraumatic.   Eyes:      Conjunctiva/sclera: Conjunctivae normal.   Cardiovascular:      Rate and Rhythm: Normal rate.   Pulmonary:      Effort: Pulmonary effort is normal. No respiratory distress.   Abdominal:      General: Abdomen is flat.      Palpations: Abdomen is soft.   Musculoskeletal:         General: No swelling. Normal range of motion.      Cervical back: Normal range of motion.   Skin:     General: Skin is warm and dry.   Neurological:      Mental Status: She is alert.   Psychiatric:         Mood and Affect: Mood normal.       Breast Examination: deferred      ECOG Performance Status:     [x] 0 Fully active, able to carry on all pre-disease performance without restriction  [] 1 Restricted in physically strenuous activity but ambulatory and able to carry out work of a light or sedentary nature, e.g., light house work, office work  [] 2 Ambulatory and capable of all selfcare but unable to carry out any work activities; up and about more than 50% of waking hours  [] 3 Capable of only limited selfcare; confined to bed or chair more than 50% of waking hours  [] 4 Completely disabled; cannot carry on any selfcare; totally confined to bed or chair  [] 5 Dead     Labs:  None since last  visit.     Imagin/14/24: Staging scans without evidence of disease    Pathology:    None since last visit.     Assessment:     Metastatic ER/TN negative, HER2 positive breast cancer diagnosed in . S/p THPx6 and has been on maintenance herceptin/perjeta (phesgo) since . On treatment holiday since 2024    Scans continue to show EARL. Of note she has ascending thoracic enlarged aorta and incidental finding of questionable thickening of wall of right colon, plan discussed below.  Patient preference to continue treatment break. Will plan for restaging in about 5 months.     Plan:    Surgical Plan: S/p L mastectomy and SLNBx in   Additional biopsy: Not indicated  Radiation Plan: Not indicated  Additional staging scans/DEXA/echo: Recent staging scans reviewed. She follows closely with onco-cardiology for echocardiograms as needed.   Additional Path info (i.e Ki67, PDL1): N/A  Gene assays: Not indicated    Systemic treatment plan: Remains on maintenance herceptin/perjeta since , now on phesgo. Treatment break initiated this visit, last phesgo 24     Intent: Palliative/disease control   Clinical trial: N/A   Endocrine therapy: Not indicated   HER2 treatment: Remains on maintenance herceptin/perjeta since , now on phesgo   Targeted agents: Not indicated   Chemotherapy: Received Docetaxel/Herceptin/Perjeta X6 in    BMA: N/A    Access: Removed  Supportive meds: N/A  Genetic testing: Information given to patient. She has not had genetic testing, sister was negative. Referral in place.   Fertility preservation: Not indicated  Other active problems/orders:     Osteopenia: 2021 DEXA. Updated ordered today, will determine need for bone modifying agent. We discussed general recommendations for bone loss prevention which include 9562-3339 mg of calcium intake per day for adults >50yrs, and no history of renal calculi; 800-1000 IU of vitamin D3 per day for adults >50yrs, and no history of  renal calculi. Weight bearing exercise. Discontinue smoking. Avoid excessive use of caffeine, soft drinks, and alcoholic beverages. In addition, balance training and fall prevention programs can help reduce the risk of fractures.     Enlarged AAA: She follows with cardiology    Questionable right colonic wall thickening: She is asymptomatic from this. Reports last colonoscopy was one year ago. Follows regularly with Dr. Weaver from gastroenterology. As he is outside of our system, she feels comfortable relaying this information to him about recent scans.     Surveillance plan: Will order CT CAP in 5 months; She also gets yearly right breast Mammograms with breast surgery team.    Follow-up:  5 months  for scan review; prefers virtual visit with provider covering me.     Patient expressed understanding of the plan outlined above. She had ample time to ask questions. She understands she can contact us should she have additional questions or issues arise in the interim.

## 2025-03-25 ENCOUNTER — APPOINTMENT (OUTPATIENT)
Dept: CARDIOLOGY | Facility: CLINIC | Age: 80
End: 2025-03-25
Payer: MEDICARE

## 2025-04-09 ENCOUNTER — HOSPITAL ENCOUNTER (OUTPATIENT)
Dept: CARDIOLOGY | Facility: CLINIC | Age: 80
Discharge: HOME | End: 2025-04-09
Payer: MEDICARE

## 2025-04-09 DIAGNOSIS — R00.9 UNSPECIFIED ABNORMALITIES OF HEART BEAT: ICD-10-CM

## 2025-04-09 PROCEDURE — 93270 REMOTE 30 DAY ECG REV/REPORT: CPT

## 2025-04-11 ENCOUNTER — HOSPITAL ENCOUNTER (OUTPATIENT)
Dept: CARDIOLOGY | Facility: CLINIC | Age: 80
Discharge: HOME | End: 2025-04-11
Payer: MEDICARE

## 2025-04-11 PROCEDURE — 93308 TTE F-UP OR LMTD: CPT

## 2025-04-14 ENCOUNTER — OFFICE VISIT (OUTPATIENT)
Dept: HEMATOLOGY/ONCOLOGY | Facility: CLINIC | Age: 80
End: 2025-04-14
Payer: MEDICARE

## 2025-04-14 ENCOUNTER — LAB (OUTPATIENT)
Dept: LAB | Facility: CLINIC | Age: 80
End: 2025-04-14
Payer: MEDICARE

## 2025-04-14 VITALS
BODY MASS INDEX: 23.07 KG/M2 | DIASTOLIC BLOOD PRESSURE: 80 MMHG | HEART RATE: 91 BPM | WEIGHT: 124.12 LBS | SYSTOLIC BLOOD PRESSURE: 122 MMHG | OXYGEN SATURATION: 95 % | RESPIRATION RATE: 16 BRPM | TEMPERATURE: 96.6 F

## 2025-04-14 DIAGNOSIS — C50.912 MALIGNANT NEOPLASM OF LEFT BREAST IN FEMALE, ESTROGEN RECEPTOR NEGATIVE, UNSPECIFIED SITE OF BREAST: ICD-10-CM

## 2025-04-14 DIAGNOSIS — C77.0 METASTASIS TO SUPRACLAVICULAR LYMPH NODE (MULTI): ICD-10-CM

## 2025-04-14 DIAGNOSIS — C77.0 METASTASIS TO SUPRACLAVICULAR LYMPH NODE (MULTI): Primary | ICD-10-CM

## 2025-04-14 DIAGNOSIS — Z17.1 MALIGNANT NEOPLASM OF LEFT BREAST IN FEMALE, ESTROGEN RECEPTOR NEGATIVE, UNSPECIFIED SITE OF BREAST: ICD-10-CM

## 2025-04-14 LAB
ALBUMIN SERPL BCP-MCNC: 4.3 G/DL (ref 3.4–5)
ALP SERPL-CCNC: 95 U/L (ref 33–136)
ALT SERPL W P-5'-P-CCNC: 18 U/L (ref 7–45)
ANION GAP SERPL CALC-SCNC: 12 MMOL/L (ref 10–20)
AST SERPL W P-5'-P-CCNC: 25 U/L (ref 9–39)
BILIRUB SERPL-MCNC: 0.6 MG/DL (ref 0–1.2)
BUN SERPL-MCNC: 14 MG/DL (ref 6–23)
CALCIUM SERPL-MCNC: 9.4 MG/DL (ref 8.6–10.6)
CHLORIDE SERPL-SCNC: 101 MMOL/L (ref 98–107)
CO2 SERPL-SCNC: 30 MMOL/L (ref 21–32)
CREAT SERPL-MCNC: 0.91 MG/DL (ref 0.5–1.05)
EGFRCR SERPLBLD CKD-EPI 2021: 65 ML/MIN/1.73M*2
GLUCOSE SERPL-MCNC: 88 MG/DL (ref 74–99)
POTASSIUM SERPL-SCNC: 4.2 MMOL/L (ref 3.5–5.3)
PROT SERPL-MCNC: 6.5 G/DL (ref 6.4–8.2)
SODIUM SERPL-SCNC: 139 MMOL/L (ref 136–145)

## 2025-04-14 PROCEDURE — 99215 OFFICE O/P EST HI 40 MIN: CPT | Performed by: STUDENT IN AN ORGANIZED HEALTH CARE EDUCATION/TRAINING PROGRAM

## 2025-04-14 PROCEDURE — 1159F MED LIST DOCD IN RCRD: CPT | Performed by: STUDENT IN AN ORGANIZED HEALTH CARE EDUCATION/TRAINING PROGRAM

## 2025-04-14 PROCEDURE — 80053 COMPREHEN METABOLIC PANEL: CPT

## 2025-04-14 PROCEDURE — 36415 COLL VENOUS BLD VENIPUNCTURE: CPT

## 2025-04-14 PROCEDURE — 1126F AMNT PAIN NOTED NONE PRSNT: CPT | Performed by: STUDENT IN AN ORGANIZED HEALTH CARE EDUCATION/TRAINING PROGRAM

## 2025-04-14 PROCEDURE — 3074F SYST BP LT 130 MM HG: CPT | Performed by: STUDENT IN AN ORGANIZED HEALTH CARE EDUCATION/TRAINING PROGRAM

## 2025-04-14 PROCEDURE — 3079F DIAST BP 80-89 MM HG: CPT | Performed by: STUDENT IN AN ORGANIZED HEALTH CARE EDUCATION/TRAINING PROGRAM

## 2025-04-14 ASSESSMENT — ENCOUNTER SYMPTOMS
NAUSEA: 0
BACK PAIN: 0
CHILLS: 0
HEADACHES: 0
DIFFICULTY URINATING: 0
FEVER: 0
HOT FLASHES: 0
LEG SWELLING: 0
COUGH: 0
CONSTIPATION: 0
ARTHRALGIAS: 0
APPETITE CHANGE: 0
DIARRHEA: 0
DYSURIA: 0
UNEXPECTED WEIGHT CHANGE: 0
FATIGUE: 0
HEMATURIA: 0
ADENOPATHY: 0
SHORTNESS OF BREATH: 0
VOMITING: 0
CONFUSION: 0
ABDOMINAL PAIN: 0
DIZZINESS: 0
NUMBNESS: 0

## 2025-04-14 ASSESSMENT — PAIN SCALES - GENERAL: PAINLEVEL_OUTOF10: 0-NO PAIN

## 2025-04-14 NOTE — PROGRESS NOTES
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 The final pathology showed scattered ducts in the superolateral segment of the breast involved by high grade ductal carcinoma in situ with multifocal areas of microinvasive carcinoma. DCIS and microinvasive tumor approaches to within 1 mm of the superior lateral margin. The DCIS and microinvasive were ER and UT negative. She also had 0 of 3 lymph nodes which were negative for malignancy. She then had a re-excision which was negative for residual DCIS or microinvasive disease. She had no adjuvant therapy.    She did well until 2014 when she self palpated L neck nodes. A CT neck with contrast was performed on 7/30/14 which showed enlarged and abnormally enhancing left posterior cervical space lymph node near the left subclavicular space. Smaller left posterior cervical space lymph nodes are also noted. A CT chest with contrast was also performed on 7/30/14 which showed new left axillary and retroclavicular adenopathy suspicious for metastatic disease, a new subtle 5 mm focus  in the T12 vertebral body suspicious for early osseous mets and a new 2 to 3 mm lung nodule in the right lower lobe. She had a PET/CT on 8/7/14 which showed intensely hypermetabolic lower cervical and thoracic adenopathy suspicious for recurrent/metastatic breast cancer. Also an intensely hypermetabolic subcarinal lymph node was seen with a max SUV of 8 suspicious for metastasis. She was seen by Dr. Fernando Paris on 8/11/14 and he performed an excisional biopsy of the left axillary lymph node as well as a left chest wall mass on 8/14.     The left axillary lymph node showed metastatic carcinoma with extracapsular extension which was ER <1%, UT 0% and HER2 3+ by IHC.    A bronchoscopy was performed on 9/2/14 which  showed malignant cells derived from adenocarcinoma, morphologically compatible with the patient's known history of breast cancer which were ER/UT negative by IHC and HER2 positive (3+ by IHC)     Patient started first line systemic chemotherapy treatment with trastuzumab, pertuzumab, and docetaxel on 2014 and completed 6 cycles with a CR by imaging.    Herceptin/Perjeta monotherapy started 2015    Treatment breask initiated 2024    GYN History/Pertinent Family history::  Sister diagnosed with breast cancer at age 52 who underwent genetic testing and was negative for BRCA1/BRCA2 per patient.     Maternal aunt with colon cancer diagnosed in her 60s.     Maternal grandmother diagnosed with lymphoma and paternal grandmother diagnosed with liver cancer.    Gyn History: 1st period at age 15. 1st pregnancy at age 32. . Last menstrual period in        Subjective: Interval History    Acute visit requested today by patient.      She presents with her .      Reports that she felt a lump in the left side of her neck 1 week ago.  She states that it has not significantly changed in size in the last week.  Reports that she has been struggling with some congestion and mild upper respiratory symptoms.  She has not had any fever.  She does have pain in her jaw and notes significant dental issues in the last 2 months including requiring 3 root canals And 3 fillings.  She denies pain in her ear or muffled hearing.  States that she has difficulty swallowing however this is not new and an ongoing issue due to esophageal stricture.  Also reports recent oral fungal infection.  She also notes increase in headaches in the last 3 to 4 weeks.  Reports a history of migraines but states has been having them a bit more frequently.  Denies significant changes in her vision.      Patient denies any palpable findings elsewhere.    Review of Systems   Constitutional:  Negative for appetite change, chills, fatigue, fever  and unexpected weight change.   HENT:   Negative for mouth sores.    Respiratory:  Negative for cough and shortness of breath.    Cardiovascular:  Negative for chest pain and leg swelling.   Gastrointestinal:  Negative for abdominal pain, constipation, diarrhea, nausea and vomiting.   Endocrine: Negative for hot flashes.   Genitourinary:  Negative for difficulty urinating, dysuria and hematuria.    Musculoskeletal:  Negative for arthralgias and back pain.   Skin:  Negative for rash.   Neurological:  Negative for dizziness, headaches and numbness.   Hematological:  Negative for adenopathy.   Psychiatric/Behavioral:  Negative for confusion.         Physical Examination:    /80   Pulse 91   Temp 35.9 °C (96.6 °F)   Resp 16   Wt 56.3 kg (124 lb 1.9 oz)   SpO2 95%   BMI 23.07 kg/m²     Physical Exam  Vitals and nursing note reviewed.   Constitutional:       General: She is not in acute distress.     Appearance: Normal appearance. She is not toxic-appearing.   HENT:      Head: Normocephalic and atraumatic.   Eyes:      Conjunctiva/sclera: Conjunctivae normal.   Neck:      Comments: A small palpable tonsillar lymph node noted at the base of the left side of jaw.  Cardiovascular:      Rate and Rhythm: Normal rate.   Pulmonary:      Effort: Pulmonary effort is normal. No respiratory distress.   Abdominal:      General: Abdomen is flat.      Palpations: Abdomen is soft.   Musculoskeletal:         General: No swelling. Normal range of motion.      Cervical back: Normal range of motion.   Skin:     General: Skin is warm and dry.   Neurological:      Mental Status: She is alert.   Psychiatric:         Mood and Affect: Mood normal.         Breast Examination: deferred      ECOG Performance Status:     [x] 0 Fully active, able to carry on all pre-disease performance without restriction  [] 1 Restricted in physically strenuous activity but ambulatory and able to carry out work of a light or sedentary nature, e.g., light  house work, office work  [] 2 Ambulatory and capable of all selfcare but unable to carry out any work activities; up and about more than 50% of waking hours  [] 3 Capable of only limited selfcare; confined to bed or chair more than 50% of waking hours  [] 4 Completely disabled; cannot carry on any selfcare; totally confined to bed or chair  [] 5 Dead     Labs:  None since last visit.     Imagin/14/24: Staging scans without evidence of disease    Pathology:    None since last visit.     Assessment:     Metastatic ER/CO negative, HER2 positive breast cancer diagnosed in . S/p THPx6 and has been on maintenance herceptin/perjeta (phesgo) since . On treatment holiday since 2024    Prior scans scans continue to show EARL.  She presents today for acute visit for palpable lymph node at the base of her left jaw.  Per prior notes and patient report, this is how her initial breast cancer was brought to light in .  Will obtain neck imaging. Will also obtain MRI brain for new headaches in light of HER2+ breast cancer.     Plan:    Surgical Plan: S/p L mastectomy and SLNBx in   Additional biopsy: Not indicated  Radiation Plan: Not indicated  Additional staging scans/DEXA/echo: Recent staging scans reviewed. She follows closely with onco-cardiology for echocardiograms as needed.   Additional Path info (i.e Ki67, PDL1): N/A  Gene assays: Not indicated    Systemic treatment plan: Remains on maintenance herceptin/perjeta since , now on phesgo. Currenty on treatment break last phesgo 24     Intent: Palliative/disease control   Clinical trial: N/A   Endocrine therapy: Not indicated   HER2 treatment: Remains on maintenance herceptin/perjeta since , now on phesgo   Targeted agents: Not indicated   Chemotherapy: Received Docetaxel/Herceptin/Perjeta X6 in    BMA: N/A    Access: Removed  Supportive meds: N/A  Genetic testing: Information given to patient. She has not had genetic testing, sister  was negative. Referral in place.   Fertility preservation: Not indicated  Other active problems/orders:     L tonsillar LN: CT neck obtained.     New headaches: MRI brain ordered    Osteopenia: We discussed general recommendations for bone loss prevention which include 9988-1694 mg of calcium intake per day for adults >50yrs, and no history of renal calculi; 800-1000 IU of vitamin D3 per day for adults >50yrs, and no history of renal calculi. Weight bearing exercise. Discontinue smoking. Avoid excessive use of caffeine, soft drinks, and alcoholic beverages. In addition, balance training and fall prevention programs can help reduce the risk of fractures.     Enlarged AAA: She follows with cardiology    Questionable right colonic wall thickening: She is asymptomatic from this. Reports last colonoscopy was one year ago. Follows regularly with Dr. Weaver from gastroenterology. As he is outside of our system, she feels comfortable relaying this information to him about recent scans. She states she has notified him and he feels comfortable monitiring    Surveillance plan: Restaging scans planned for 8/25/2025 She also gets yearly right breast Mammograms with breast surgery team.    Follow-up: Will call with results of CT neck and MRI brain.  In absence of significant findings, she has her routine surveillance scan and scan review follow-up already scheduled for 8/25/2025    Patient expressed understanding of the plan outlined above. She had ample time to ask questions. She understands she can contact us should she have additional questions or issues arise in the interim.

## 2025-04-14 NOTE — PROGRESS NOTES
Patient in today for new symptoms.  Stat CT neck with contrast ordered. Patient to lab for pre-imaging labs.  Also for MRI brain.  Patient and  aware of both scans.  Patient has FUV in August.  Instructed we will reach out if needed with imaging or that patient may call in for results.  No further needs at this time  No questions.  Patient independently ambulatory off unit in NAD and without complaints.  Gait steady.  Call back instructions reviewed.  Patient and  verbalized understanding.

## 2025-04-15 ENCOUNTER — HOSPITAL ENCOUNTER (OUTPATIENT)
Dept: RADIOLOGY | Facility: HOSPITAL | Age: 80
Discharge: HOME | End: 2025-04-15
Payer: MEDICARE

## 2025-04-15 DIAGNOSIS — C77.0 METASTASIS TO SUPRACLAVICULAR LYMPH NODE (MULTI): ICD-10-CM

## 2025-04-15 PROCEDURE — 2550000001 HC RX 255 CONTRASTS: Performed by: STUDENT IN AN ORGANIZED HEALTH CARE EDUCATION/TRAINING PROGRAM

## 2025-04-15 PROCEDURE — 70491 CT SOFT TISSUE NECK W/DYE: CPT

## 2025-04-15 PROCEDURE — 70553 MRI BRAIN STEM W/O & W/DYE: CPT

## 2025-04-15 PROCEDURE — 70491 CT SOFT TISSUE NECK W/DYE: CPT | Performed by: RADIOLOGY

## 2025-04-15 PROCEDURE — A9575 INJ GADOTERATE MEGLUMI 0.1ML: HCPCS | Performed by: STUDENT IN AN ORGANIZED HEALTH CARE EDUCATION/TRAINING PROGRAM

## 2025-04-15 RX ORDER — GADOTERATE MEGLUMINE 376.9 MG/ML
11 INJECTION INTRAVENOUS
Status: COMPLETED | OUTPATIENT
Start: 2025-04-15 | End: 2025-04-15

## 2025-04-15 RX ADMIN — IOHEXOL 68 ML: 350 INJECTION, SOLUTION INTRAVENOUS at 12:25

## 2025-04-15 RX ADMIN — GADOTERATE MEGLUMINE 11 ML: 376.9 INJECTION INTRAVENOUS at 12:43

## 2025-04-16 LAB
EJECTION FRACTION APICAL 4 CHAMBER: 51
EJECTION FRACTION: 53 %
LEFT ATRIUM VOLUME AREA LENGTH INDEX BSA: 23.5 ML/M2
LEFT VENTRICLE INTERNAL DIMENSION DIASTOLE: 3.97 CM (ref 3.5–6)
LEFT VENTRICULAR OUTFLOW TRACT DIAMETER: 2.03 CM
MITRAL VALVE E/A RATIO: 0.42
RIGHT VENTRICLE PEAK SYSTOLIC PRESSURE: 23.1 MMHG
TRICUSPID ANNULAR PLANE SYSTOLIC EXCURSION: 1.7 CM

## 2025-04-17 ENCOUNTER — TELEPHONE (OUTPATIENT)
Dept: HEMATOLOGY/ONCOLOGY | Facility: CLINIC | Age: 80
End: 2025-04-17

## 2025-04-17 ENCOUNTER — TELEPHONE (OUTPATIENT)
Dept: HEMATOLOGY/ONCOLOGY | Facility: CLINIC | Age: 80
End: 2025-04-17
Payer: MEDICARE

## 2025-04-17 NOTE — TELEPHONE ENCOUNTER
I called patient to discuss CT neck and MRI brain. Call went to . I left message with instructions to call back.

## 2025-04-18 ENCOUNTER — TELEPHONE (OUTPATIENT)
Dept: HEMATOLOGY/ONCOLOGY | Facility: CLINIC | Age: 80
End: 2025-04-18

## 2025-04-18 ENCOUNTER — APPOINTMENT (OUTPATIENT)
Dept: CARDIOLOGY | Facility: CLINIC | Age: 80
End: 2025-04-18
Payer: MEDICARE

## 2025-04-18 DIAGNOSIS — C77.0 METASTASIS TO SUPRACLAVICULAR LYMPH NODE (MULTI): Primary | ICD-10-CM

## 2025-04-18 NOTE — TELEPHONE ENCOUNTER
I called patient to review results of CT neck and MRI brain We discussed these findings in detail. No MRI explanation for headaches. Patient feels they are allergy related. CT neck overall unremarkable for malignancy but notes a nonspecific borderline size 15 mm left level 2 suprahyoid internal jugular chain lymph node. It is unclear if this correlates to what she felt on exam. Given these subtle findings, we discussed option of obtaining neck US now versus repeat CT neck at time of the rest of her staging scans. She elects to repeat CT in August. She was counseled on monitoring this small tonsillar node closely and if any change to call us for prompt evaluation. Patient was appreciative of the call. All of the patient's questions were answered and concerns were addressed. The patient has no further needs at this time. Follow-up in place for 8/25/2025 for scan review.

## 2025-04-21 ENCOUNTER — TELEPHONE (OUTPATIENT)
Dept: CARDIOLOGY | Facility: CLINIC | Age: 80
End: 2025-04-21
Payer: MEDICARE

## 2025-04-21 NOTE — TELEPHONE ENCOUNTER
PT NOTIFIED.     ----- Message from Hira Duenas sent at 4/21/2025  1:05 PM EDT -----  Please let the patient know that I have reviewed this test and the result was normal. The patient should keep current medication and normal activities at this point.   ----- Message -----  From: Ander, Syngo - Cardiology Results In  Sent: 4/16/2025   2:39 PM EDT  To: Hira Duenas MD

## 2025-04-24 ENCOUNTER — APPOINTMENT (OUTPATIENT)
Dept: CARDIOLOGY | Facility: CLINIC | Age: 80
End: 2025-04-24
Payer: MEDICARE

## 2025-05-28 ENCOUNTER — TELEPHONE (OUTPATIENT)
Dept: HEMATOLOGY/ONCOLOGY | Facility: CLINIC | Age: 80
End: 2025-05-28
Payer: MEDICARE

## 2025-05-28 NOTE — TELEPHONE ENCOUNTER
Call received from Oscar at Dr Rust office requesting date of last Prolia injection.   Chart reviewed by RN .  No Denosumab noted in current EMR.  Spoke with [facility] pharmacist who states no Denosumab noted in current record.  Call placed to patient.  Patient states she received it several years ago when Dr Bautista was still here.  Will have pharmacy get date and follow-up with Oscar [phone] #[phone].    
Have You Had Previous Labs For This Condition?: has had previous labs
Tylor Del Toro in pharmacy last dose 7/28/2023.  Call returned to [facility].  No answer.  Information left on identifiable VM with call back instructions.    
When Was Your Last Blood Work Done?: 11/24/2020

## 2025-06-03 ENCOUNTER — OFFICE (OUTPATIENT)
Dept: URBAN - METROPOLITAN AREA CLINIC 26 | Facility: CLINIC | Age: 80
End: 2025-06-03
Payer: MEDICARE

## 2025-06-03 VITALS
TEMPERATURE: 97.6 F | DIASTOLIC BLOOD PRESSURE: 87 MMHG | HEART RATE: 85 BPM | SYSTOLIC BLOOD PRESSURE: 138 MMHG | WEIGHT: 122 LBS | HEIGHT: 63 IN

## 2025-06-03 DIAGNOSIS — Z87.19 PERSONAL HISTORY OF OTHER DISEASES OF THE DIGESTIVE SYSTEM: ICD-10-CM

## 2025-06-03 DIAGNOSIS — R13.10 DYSPHAGIA, UNSPECIFIED: ICD-10-CM

## 2025-06-03 DIAGNOSIS — Z86.0100 PERSONAL HISTORY OF COLON POLYPS, UNSPECIFIED: ICD-10-CM

## 2025-06-03 DIAGNOSIS — K22.70 BARRETT'S ESOPHAGUS WITHOUT DYSPLASIA: ICD-10-CM

## 2025-06-03 DIAGNOSIS — K21.9 GASTRO-ESOPHAGEAL REFLUX DISEASE WITHOUT ESOPHAGITIS: ICD-10-CM

## 2025-06-03 DIAGNOSIS — B37.81 CANDIDAL ESOPHAGITIS: ICD-10-CM

## 2025-06-03 DIAGNOSIS — Z80.0 FAMILY HISTORY OF MALIGNANT NEOPLASM OF DIGESTIVE ORGANS: ICD-10-CM

## 2025-06-03 PROCEDURE — 99213 OFFICE O/P EST LOW 20 MIN: CPT | Performed by: CLINICAL NURSE SPECIALIST

## 2025-07-11 ENCOUNTER — AMBULATORY SURGICAL CENTER (OUTPATIENT)
Dept: URBAN - METROPOLITAN AREA SURGERY 12 | Facility: SURGERY | Age: 80
End: 2025-07-11
Payer: MEDICARE

## 2025-07-11 ENCOUNTER — OFFICE (OUTPATIENT)
Dept: URBAN - METROPOLITAN AREA PATHOLOGY 2 | Facility: PATHOLOGY | Age: 80
End: 2025-07-11
Payer: MEDICARE

## 2025-07-11 VITALS
SYSTOLIC BLOOD PRESSURE: 155 MMHG | RESPIRATION RATE: 13 BRPM | DIASTOLIC BLOOD PRESSURE: 80 MMHG | DIASTOLIC BLOOD PRESSURE: 68 MMHG | HEART RATE: 82 BPM | RESPIRATION RATE: 12 BRPM | SYSTOLIC BLOOD PRESSURE: 157 MMHG | RESPIRATION RATE: 18 BRPM | SYSTOLIC BLOOD PRESSURE: 201 MMHG | SYSTOLIC BLOOD PRESSURE: 134 MMHG | DIASTOLIC BLOOD PRESSURE: 76 MMHG | DIASTOLIC BLOOD PRESSURE: 71 MMHG | DIASTOLIC BLOOD PRESSURE: 79 MMHG | SYSTOLIC BLOOD PRESSURE: 103 MMHG | DIASTOLIC BLOOD PRESSURE: 68 MMHG | DIASTOLIC BLOOD PRESSURE: 77 MMHG | HEART RATE: 110 BPM | OXYGEN SATURATION: 98 % | HEART RATE: 79 BPM | SYSTOLIC BLOOD PRESSURE: 134 MMHG | HEART RATE: 84 BPM | HEART RATE: 84 BPM | DIASTOLIC BLOOD PRESSURE: 82 MMHG | HEART RATE: 85 BPM | DIASTOLIC BLOOD PRESSURE: 80 MMHG | HEART RATE: 84 BPM | SYSTOLIC BLOOD PRESSURE: 152 MMHG | DIASTOLIC BLOOD PRESSURE: 79 MMHG | RESPIRATION RATE: 11 BRPM | DIASTOLIC BLOOD PRESSURE: 92 MMHG | HEART RATE: 89 BPM | SYSTOLIC BLOOD PRESSURE: 163 MMHG | HEART RATE: 80 BPM | OXYGEN SATURATION: 88 % | OXYGEN SATURATION: 88 % | RESPIRATION RATE: 11 BRPM | HEART RATE: 108 BPM | SYSTOLIC BLOOD PRESSURE: 163 MMHG | OXYGEN SATURATION: 99 % | HEART RATE: 93 BPM | HEART RATE: 108 BPM | HEART RATE: 82 BPM | SYSTOLIC BLOOD PRESSURE: 155 MMHG | HEART RATE: 80 BPM | HEART RATE: 79 BPM | HEART RATE: 110 BPM | OXYGEN SATURATION: 99 % | DIASTOLIC BLOOD PRESSURE: 82 MMHG | HEART RATE: 85 BPM | DIASTOLIC BLOOD PRESSURE: 132 MMHG | HEART RATE: 110 BPM | SYSTOLIC BLOOD PRESSURE: 155 MMHG | DIASTOLIC BLOOD PRESSURE: 86 MMHG | RESPIRATION RATE: 20 BRPM | WEIGHT: 120 LBS | SYSTOLIC BLOOD PRESSURE: 148 MMHG | OXYGEN SATURATION: 88 % | RESPIRATION RATE: 21 BRPM | RESPIRATION RATE: 22 BRPM | RESPIRATION RATE: 18 BRPM | OXYGEN SATURATION: 100 % | SYSTOLIC BLOOD PRESSURE: 148 MMHG | RESPIRATION RATE: 13 BRPM | DIASTOLIC BLOOD PRESSURE: 86 MMHG | TEMPERATURE: 96.8 F | RESPIRATION RATE: 21 BRPM | OXYGEN SATURATION: 98 % | RESPIRATION RATE: 10 BRPM | OXYGEN SATURATION: 99 % | DIASTOLIC BLOOD PRESSURE: 132 MMHG | RESPIRATION RATE: 21 BRPM | OXYGEN SATURATION: 98 % | HEART RATE: 77 BPM | DIASTOLIC BLOOD PRESSURE: 102 MMHG | HEART RATE: 79 BPM | HEART RATE: 85 BPM | RESPIRATION RATE: 11 BRPM | HEART RATE: 89 BPM | DIASTOLIC BLOOD PRESSURE: 76 MMHG | HEART RATE: 82 BPM | DIASTOLIC BLOOD PRESSURE: 79 MMHG | RESPIRATION RATE: 20 BRPM | DIASTOLIC BLOOD PRESSURE: 77 MMHG | HEART RATE: 77 BPM | HEART RATE: 80 BPM | HEIGHT: 63 IN | HEART RATE: 93 BPM | SYSTOLIC BLOOD PRESSURE: 163 MMHG | OXYGEN SATURATION: 100 % | HEART RATE: 77 BPM | SYSTOLIC BLOOD PRESSURE: 112 MMHG | DIASTOLIC BLOOD PRESSURE: 71 MMHG | WEIGHT: 120 LBS | DIASTOLIC BLOOD PRESSURE: 102 MMHG | SYSTOLIC BLOOD PRESSURE: 152 MMHG | DIASTOLIC BLOOD PRESSURE: 71 MMHG | DIASTOLIC BLOOD PRESSURE: 77 MMHG | SYSTOLIC BLOOD PRESSURE: 112 MMHG | HEART RATE: 89 BPM | DIASTOLIC BLOOD PRESSURE: 86 MMHG | SYSTOLIC BLOOD PRESSURE: 157 MMHG | SYSTOLIC BLOOD PRESSURE: 148 MMHG | TEMPERATURE: 96.8 F | DIASTOLIC BLOOD PRESSURE: 68 MMHG | OXYGEN SATURATION: 100 % | HEART RATE: 108 BPM | DIASTOLIC BLOOD PRESSURE: 102 MMHG | SYSTOLIC BLOOD PRESSURE: 134 MMHG | DIASTOLIC BLOOD PRESSURE: 82 MMHG | DIASTOLIC BLOOD PRESSURE: 92 MMHG | TEMPERATURE: 96.8 F | SYSTOLIC BLOOD PRESSURE: 152 MMHG | SYSTOLIC BLOOD PRESSURE: 112 MMHG | RESPIRATION RATE: 18 BRPM | RESPIRATION RATE: 22 BRPM | SYSTOLIC BLOOD PRESSURE: 157 MMHG | HEIGHT: 63 IN | RESPIRATION RATE: 12 BRPM | SYSTOLIC BLOOD PRESSURE: 201 MMHG | RESPIRATION RATE: 10 BRPM | DIASTOLIC BLOOD PRESSURE: 76 MMHG | HEART RATE: 93 BPM | RESPIRATION RATE: 20 BRPM | SYSTOLIC BLOOD PRESSURE: 103 MMHG | DIASTOLIC BLOOD PRESSURE: 80 MMHG | RESPIRATION RATE: 10 BRPM | SYSTOLIC BLOOD PRESSURE: 201 MMHG | RESPIRATION RATE: 22 BRPM | DIASTOLIC BLOOD PRESSURE: 92 MMHG | DIASTOLIC BLOOD PRESSURE: 132 MMHG | RESPIRATION RATE: 12 BRPM | RESPIRATION RATE: 13 BRPM | SYSTOLIC BLOOD PRESSURE: 103 MMHG | WEIGHT: 120 LBS | HEIGHT: 63 IN

## 2025-07-11 DIAGNOSIS — K44.9 DIAPHRAGMATIC HERNIA WITHOUT OBSTRUCTION OR GANGRENE: ICD-10-CM

## 2025-07-11 DIAGNOSIS — K31.89 OTHER DISEASES OF STOMACH AND DUODENUM: ICD-10-CM

## 2025-07-11 DIAGNOSIS — K22.2 ESOPHAGEAL OBSTRUCTION: ICD-10-CM

## 2025-07-11 DIAGNOSIS — R13.10 DYSPHAGIA, UNSPECIFIED: ICD-10-CM

## 2025-07-11 DIAGNOSIS — K22.89 OTHER SPECIFIED DISEASE OF ESOPHAGUS: ICD-10-CM

## 2025-07-11 DIAGNOSIS — K22.70 BARRETT'S ESOPHAGUS WITHOUT DYSPLASIA: ICD-10-CM

## 2025-07-11 DIAGNOSIS — K21.00 GASTRO-ESOPHAGEAL REFLUX DISEASE WITH ESOPHAGITIS, WITHOUT B: ICD-10-CM

## 2025-07-11 DIAGNOSIS — K20.80 OTHER ESOPHAGITIS WITHOUT BLEEDING: ICD-10-CM

## 2025-07-11 DIAGNOSIS — K21.9 GASTRO-ESOPHAGEAL REFLUX DISEASE WITHOUT ESOPHAGITIS: ICD-10-CM

## 2025-07-11 PROBLEM — K20.90 ESOPHAGITIS, UNSPECIFIED WITHOUT BLEEDING: Status: ACTIVE | Noted: 2025-07-11

## 2025-07-11 PROCEDURE — 43450 DILATE ESOPHAGUS 1/MULT PASS: CPT | Performed by: INTERNAL MEDICINE

## 2025-07-11 PROCEDURE — 88305 TISSUE EXAM BY PATHOLOGIST: CPT | Performed by: PATHOLOGY

## 2025-07-11 PROCEDURE — 88342 IMHCHEM/IMCYTCHM 1ST ANTB: CPT | Performed by: PATHOLOGY

## 2025-07-11 PROCEDURE — 88313 SPECIAL STAINS GROUP 2: CPT | Performed by: PATHOLOGY

## 2025-07-11 PROCEDURE — 43239 EGD BIOPSY SINGLE/MULTIPLE: CPT | Performed by: INTERNAL MEDICINE

## 2025-07-24 ENCOUNTER — PATIENT MESSAGE (OUTPATIENT)
Dept: HEMATOLOGY/ONCOLOGY | Facility: CLINIC | Age: 80
End: 2025-07-24
Payer: MEDICARE

## 2025-07-24 DIAGNOSIS — C50.919 MALIGNANT NEOPLASM OF FEMALE BREAST, UNSPECIFIED ESTROGEN RECEPTOR STATUS, UNSPECIFIED LATERALITY, UNSPECIFIED SITE OF BREAST: Primary | ICD-10-CM

## 2025-08-04 ENCOUNTER — LAB (OUTPATIENT)
Dept: LAB | Facility: CLINIC | Age: 80
End: 2025-08-04
Payer: MEDICARE

## 2025-08-04 DIAGNOSIS — C50.919 MALIGNANT NEOPLASM OF FEMALE BREAST, UNSPECIFIED ESTROGEN RECEPTOR STATUS, UNSPECIFIED LATERALITY, UNSPECIFIED SITE OF BREAST: ICD-10-CM

## 2025-08-04 LAB
BASOPHILS # BLD AUTO: 0.03 X10*3/UL (ref 0–0.1)
BASOPHILS NFR BLD AUTO: 0.6 %
EOSINOPHIL # BLD AUTO: 0.23 X10*3/UL (ref 0–0.4)
EOSINOPHIL NFR BLD AUTO: 4.3 %
ERYTHROCYTE [DISTWIDTH] IN BLOOD BY AUTOMATED COUNT: 12.9 % (ref 11.5–14.5)
HCT VFR BLD AUTO: 37.4 % (ref 36–46)
HGB BLD-MCNC: 12.4 G/DL (ref 12–16)
IMM GRANULOCYTES # BLD AUTO: 0.01 X10*3/UL (ref 0–0.5)
IMM GRANULOCYTES NFR BLD AUTO: 0.2 % (ref 0–0.9)
LYMPHOCYTES # BLD AUTO: 1.65 X10*3/UL (ref 0.8–3)
LYMPHOCYTES NFR BLD AUTO: 30.8 %
MCH RBC QN AUTO: 33 PG (ref 26–34)
MCHC RBC AUTO-ENTMCNC: 33.2 G/DL (ref 32–36)
MCV RBC AUTO: 100 FL (ref 80–100)
MONOCYTES # BLD AUTO: 0.48 X10*3/UL (ref 0.05–0.8)
MONOCYTES NFR BLD AUTO: 9 %
NEUTROPHILS # BLD AUTO: 2.95 X10*3/UL (ref 1.6–5.5)
NEUTROPHILS NFR BLD AUTO: 55.1 %
NRBC BLD-RTO: ABNORMAL /100{WBCS}
PLATELET # BLD AUTO: 244 X10*3/UL (ref 150–450)
RBC # BLD AUTO: 3.76 X10*6/UL (ref 4–5.2)
WBC # BLD AUTO: 5.4 X10*3/UL (ref 4.4–11.3)

## 2025-08-04 PROCEDURE — 80053 COMPREHEN METABOLIC PANEL: CPT

## 2025-08-04 PROCEDURE — 85025 COMPLETE CBC W/AUTO DIFF WBC: CPT

## 2025-08-04 PROCEDURE — 36415 COLL VENOUS BLD VENIPUNCTURE: CPT

## 2025-08-05 LAB
ALBUMIN SERPL BCP-MCNC: 4.1 G/DL (ref 3.4–5)
ALP SERPL-CCNC: 52 U/L (ref 33–136)
ALT SERPL W P-5'-P-CCNC: 15 U/L (ref 7–45)
ANION GAP SERPL CALC-SCNC: 11 MMOL/L (ref 10–20)
AST SERPL W P-5'-P-CCNC: 23 U/L (ref 9–39)
BILIRUB SERPL-MCNC: 0.5 MG/DL (ref 0–1.2)
BUN SERPL-MCNC: 23 MG/DL (ref 6–23)
CALCIUM SERPL-MCNC: 9.2 MG/DL (ref 8.6–10.6)
CHLORIDE SERPL-SCNC: 103 MMOL/L (ref 98–107)
CO2 SERPL-SCNC: 29 MMOL/L (ref 21–32)
CREAT SERPL-MCNC: 0.97 MG/DL (ref 0.5–1.05)
EGFRCR SERPLBLD CKD-EPI 2021: 60 ML/MIN/1.73M*2
GLUCOSE SERPL-MCNC: 86 MG/DL (ref 74–99)
POTASSIUM SERPL-SCNC: 4.5 MMOL/L (ref 3.5–5.3)
PROT SERPL-MCNC: 6.3 G/DL (ref 6.4–8.2)
SODIUM SERPL-SCNC: 138 MMOL/L (ref 136–145)

## 2025-08-11 ENCOUNTER — HOSPITAL ENCOUNTER (OUTPATIENT)
Dept: RADIOLOGY | Facility: CLINIC | Age: 80
Discharge: HOME | End: 2025-08-11
Payer: MEDICARE

## 2025-08-11 ENCOUNTER — APPOINTMENT (OUTPATIENT)
Dept: RADIOLOGY | Facility: CLINIC | Age: 80
End: 2025-08-11
Payer: MEDICARE

## 2025-08-11 DIAGNOSIS — C77.0 METASTASIS TO SUPRACLAVICULAR LYMPH NODE (MULTI): ICD-10-CM

## 2025-08-11 DIAGNOSIS — C50.912 MALIGNANT NEOPLASM OF LEFT BREAST IN FEMALE, ESTROGEN RECEPTOR NEGATIVE, UNSPECIFIED SITE OF BREAST: ICD-10-CM

## 2025-08-11 DIAGNOSIS — Z17.1 MALIGNANT NEOPLASM OF LEFT BREAST IN FEMALE, ESTROGEN RECEPTOR NEGATIVE, UNSPECIFIED SITE OF BREAST: ICD-10-CM

## 2025-08-11 PROCEDURE — 74177 CT ABD & PELVIS W/CONTRAST: CPT | Performed by: RADIOLOGY

## 2025-08-11 PROCEDURE — 2550000001 HC RX 255 CONTRASTS: Performed by: STUDENT IN AN ORGANIZED HEALTH CARE EDUCATION/TRAINING PROGRAM

## 2025-08-11 PROCEDURE — 70491 CT SOFT TISSUE NECK W/DYE: CPT | Performed by: RADIOLOGY

## 2025-08-11 PROCEDURE — 71260 CT THORAX DX C+: CPT | Performed by: RADIOLOGY

## 2025-08-11 PROCEDURE — 74177 CT ABD & PELVIS W/CONTRAST: CPT

## 2025-08-11 PROCEDURE — 70491 CT SOFT TISSUE NECK W/DYE: CPT

## 2025-08-11 RX ADMIN — IOHEXOL 65 ML: 350 INJECTION, SOLUTION INTRAVENOUS at 10:36

## 2025-08-11 RX ADMIN — IOHEXOL 70 ML: 350 INJECTION, SOLUTION INTRAVENOUS at 10:33

## 2025-08-25 ENCOUNTER — TELEMEDICINE (OUTPATIENT)
Dept: HEMATOLOGY/ONCOLOGY | Facility: HOSPITAL | Age: 80
End: 2025-08-25
Payer: MEDICARE

## 2025-08-25 DIAGNOSIS — C77.0 METASTASIS TO SUPRACLAVICULAR LYMPH NODE (MULTI): ICD-10-CM

## 2025-08-25 DIAGNOSIS — Z17.1 MALIGNANT NEOPLASM OF LEFT BREAST IN FEMALE, ESTROGEN RECEPTOR NEGATIVE, UNSPECIFIED SITE OF BREAST: ICD-10-CM

## 2025-08-25 DIAGNOSIS — C77.0: ICD-10-CM

## 2025-08-25 DIAGNOSIS — Z85.3 ENCOUNTER FOR FOLLOW-UP SURVEILLANCE OF BREAST CANCER: ICD-10-CM

## 2025-08-25 DIAGNOSIS — Z08 ENCOUNTER FOR FOLLOW-UP SURVEILLANCE OF BREAST CANCER: ICD-10-CM

## 2025-08-25 DIAGNOSIS — C50.919 MALIGNANT NEOPLASM OF FEMALE BREAST, UNSPECIFIED ESTROGEN RECEPTOR STATUS, UNSPECIFIED LATERALITY, UNSPECIFIED SITE OF BREAST: ICD-10-CM

## 2025-08-25 DIAGNOSIS — C50.912 MALIGNANT NEOPLASM OF LEFT BREAST IN FEMALE, ESTROGEN RECEPTOR NEGATIVE, UNSPECIFIED SITE OF BREAST: ICD-10-CM

## 2025-08-25 PROCEDURE — 1160F RVW MEDS BY RX/DR IN RCRD: CPT | Performed by: NURSE PRACTITIONER

## 2025-08-25 PROCEDURE — 99214 OFFICE O/P EST MOD 30 MIN: CPT | Performed by: NURSE PRACTITIONER

## 2025-08-25 PROCEDURE — 1159F MED LIST DOCD IN RCRD: CPT | Performed by: NURSE PRACTITIONER
